# Patient Record
Sex: FEMALE | Race: WHITE | NOT HISPANIC OR LATINO | Employment: OTHER | URBAN - NONMETROPOLITAN AREA
[De-identification: names, ages, dates, MRNs, and addresses within clinical notes are randomized per-mention and may not be internally consistent; named-entity substitution may affect disease eponyms.]

---

## 2018-01-09 ENCOUNTER — OFFICE VISIT (OUTPATIENT)
Dept: PODIATRY | Facility: CLINIC | Age: 58
End: 2018-01-09

## 2018-01-09 VITALS
OXYGEN SATURATION: 97 % | BODY MASS INDEX: 33.63 KG/M2 | DIASTOLIC BLOOD PRESSURE: 78 MMHG | SYSTOLIC BLOOD PRESSURE: 125 MMHG | HEIGHT: 63 IN | WEIGHT: 189.82 LBS | HEART RATE: 54 BPM

## 2018-01-09 DIAGNOSIS — M72.2 PLANTAR FASCIITIS: ICD-10-CM

## 2018-01-09 DIAGNOSIS — M20.21 HALLUX RIGIDUS OF RIGHT FOOT: Primary | ICD-10-CM

## 2018-01-09 DIAGNOSIS — M79.672 BILATERAL FOOT PAIN: ICD-10-CM

## 2018-01-09 DIAGNOSIS — M79.671 BILATERAL FOOT PAIN: ICD-10-CM

## 2018-01-09 PROCEDURE — 99204 OFFICE O/P NEW MOD 45 MIN: CPT | Performed by: PODIATRIST

## 2018-01-09 RX ORDER — DULOXETIN HYDROCHLORIDE 30 MG/1
30 CAPSULE, DELAYED RELEASE ORAL
COMMUNITY
Start: 2018-01-02 | End: 2018-05-25 | Stop reason: SDUPTHER

## 2018-01-09 NOTE — PROGRESS NOTES
Brenda David  1960  57 y.o. female  Patient presents today for bilateral foot pain and plantar fasciitis.  Patient also states that she has tendinitis of the right foot.      2018  Chief Complaint   Patient presents with   • Left Foot - Pain, Plantar Fasciitis   • Right Foot - Pain, Plantar Fasciitis           History of Present Illness    Brenda David is a 57 y.o. female who presents for evaluation of bilateral foot pain.  She states the pain is worse in her right foot than her left.  She states that she has a known history of arthritis of her big toe joint on the right as well as plantar fasciitis on both feet.  She has been previously seen by other specialists for this issue.  He was previously recommended to her for a joint implant of the right first metatarsal phalangeal joint.  Patient states she's been putting this off for insurance reasons.  She believes that she is ready for surgery at this time.  She does primarily complains of achy, throbbing pain which is worse with activity.  She denies any recent trauma or injuries.  She denies any current treatments for this issue other than good shoe gear.        Past Medical History:   Diagnosis Date   • Arthritis    • History of     • Ingrown toenail    • Plantar fasciitis          Past Surgical History:   Procedure Laterality Date   • BACK SURGERY     •  SECTION     • HYSTERECTOMY     • SINUS SURGERY           Family History   Problem Relation Age of Onset   • Heart disease Mother    • Hypertension Mother    • Thyroid disease Mother    • Heart disease Father    • Hypertension Father    • Thyroid disease Father    • Thyroid disease Sister          Social History     Social History   • Marital status:      Spouse name: N/A   • Number of children: N/A   • Years of education: N/A     Occupational History   • Not on file.     Social History Main Topics   • Smoking status: Former Smoker   • Smokeless  "tobacco: Never Used   • Alcohol use No   • Drug use: No   • Sexual activity: Defer     Other Topics Concern   • Not on file     Social History Narrative         Current Outpatient Prescriptions   Medication Sig Dispense Refill   • DULoxetine (CYMBALTA) 30 MG capsule Take 30 mg by mouth.       No current facility-administered medications for this visit.          OBJECTIVE    /78  Pulse 54  Ht 160 cm (63\")  Wt 86.1 kg (189 lb 13.1 oz)  SpO2 97%  BMI 33.62 kg/m2      Review of Systems   Constitutional: Negative.    HENT: Negative.    Respiratory: Negative.    Cardiovascular: Negative.    Gastrointestinal: Negative.    Musculoskeletal: Positive for arthralgias and back pain.        Foot pain   All other systems reviewed and are negative.        Physical Exam   Constitutional: she appears well-developed and well-nourished.   HEENT: Normocephalic. Atraumatic  CV: No CP. RRR  Resp: Non-labored respirations. CTAB  Abd: Soft, non-tender  Lymphatic: No lymphadenopathy  Psychiatric: she has a normal mood and affect. her behavior is normal.         Lower Extremity Exam:  Vascular: DP/PT pulses palpable 2+.   Minimal right foot edema  Foot warm  CFT wnl  Neuro: Protective sensation intact, b/l.  DTRs intact  Integument: No open wounds or lesions.  No erythema, scaling  Skin quality normal  Musculoskeletal: LE muscle strength 5/5.   Gait normal  Ankle ROM decreased without pain or crepitus  STJ ROM full without pain or crepitus  1st MTPJ ROM decreased with tenderness and crepitus.   +Dorsomedial 1st mtpj eminence. Mild tenderness on palpation.  Minimal tenderness to palpation of medial plantar fascial band, b/l      Bilateral foot radiographs- Normal osseous density. No acute fractures, subluxations or erosions.  Significant degenerative changes to right first metatarsophalangeal joint.        ASSESSMENT AND PLAN    Brenda was seen today for pain, plantar fasciitis, pain and plantar fasciitis.    Diagnoses and all " orders for this visit:    Hallux rigidus of right foot  -     Case Request    Bilateral foot pain  -     XR foot 3+ vw bilateral; Future    Plantar fasciitis    -Comprehensive foot and ankle exam performed  -Radiographs ordered and reviewed  -Educated pt on diagnosis, etiology and treatment of hallux rigidus  -Recommend soft, wide toe box accommodative shoe gear.  -Had at length discussion regarding surgical intervention.  Given patient age and severity of arthrosis I would recommend a first MTPJ arthrodesis over implant and I believe this will have better long-term success and limit potential need for reoperation.  Patient is in agreement with this and would like to proceed.  All risks benefits and potential palpitations described including but not limited to delayed healing risk of infection, nonunion, continued pain and need for further operation.  -Will plan for OR 1/17/18  -Recheck 2 weeks          This document has been electronically signed by Kwadwo Jensen DPM on January 16, 2018 10:16 AM     EMR Dragon/Transcription disclaimer:   Much of this encounter note is an electronic transcription/translation of spoken language to printed text. The electronic translation of spoken language may permit erroneous, or at times, nonsensical words or phrases to be inadvertently transcribed; Although I have reviewed the note for such errors, some may still exist.    Kwadwo Jensen DPM  1/16/2018  10:16 AM

## 2018-01-10 PROBLEM — M20.21 HALLUX RIGIDUS OF RIGHT FOOT: Status: ACTIVE | Noted: 2018-01-10

## 2018-01-11 ENCOUNTER — TELEPHONE (OUTPATIENT)
Dept: PODIATRY | Facility: CLINIC | Age: 58
End: 2018-01-11

## 2018-01-11 NOTE — TELEPHONE ENCOUNTER
DR DEL [facility] OFFICE CALLED.  SAID PATIENT TOLD THEM THAT SURGICAL CLEARANCE NEEDS TO BE FAXED TO US BUT THEY ARE UNSURE WHAT YOU NEED.    [phone]    THANKS

## 2018-01-11 NOTE — TELEPHONE ENCOUNTER
Call Dr. Padgett's office and nurses were busy. Left a message for them to call back or fax surgical clearance.

## 2018-01-15 ENCOUNTER — APPOINTMENT (OUTPATIENT)
Dept: PREADMISSION TESTING | Facility: HOSPITAL | Age: 58
End: 2018-01-15

## 2018-01-15 ENCOUNTER — TELEPHONE (OUTPATIENT)
Dept: PODIATRY | Facility: CLINIC | Age: 58
End: 2018-01-15

## 2018-01-15 VITALS — BODY MASS INDEX: 30.83 KG/M2 | OXYGEN SATURATION: 99 % | HEIGHT: 63 IN | WEIGHT: 174 LBS

## 2018-01-15 RX ORDER — SODIUM CHLORIDE, SODIUM GLUCONATE, SODIUM ACETATE, POTASSIUM CHLORIDE AND MAGNESIUM CHLORIDE 526; 502; 368; 37; 30 MG/100ML; MG/100ML; MG/100ML; MG/100ML; MG/100ML
1000 INJECTION, SOLUTION INTRAVENOUS CONTINUOUS
Status: CANCELLED | OUTPATIENT
Start: 2018-01-24

## 2018-01-24 ENCOUNTER — HOSPITAL ENCOUNTER (OUTPATIENT)
Facility: HOSPITAL | Age: 58
Setting detail: HOSPITAL OUTPATIENT SURGERY
Discharge: HOME | End: 2018-01-24
Attending: PODIATRIST | Admitting: PODIATRIST

## 2018-01-24 ENCOUNTER — ANESTHESIA (OUTPATIENT)
Dept: PERIOP | Facility: HOSPITAL | Age: 58
End: 2018-01-24

## 2018-01-24 ENCOUNTER — APPOINTMENT (OUTPATIENT)
Dept: GENERAL RADIOLOGY | Facility: HOSPITAL | Age: 58
End: 2018-01-24

## 2018-01-24 ENCOUNTER — ANESTHESIA EVENT (OUTPATIENT)
Dept: PERIOP | Facility: HOSPITAL | Age: 58
End: 2018-01-24

## 2018-01-24 VITALS
OXYGEN SATURATION: 96 % | BODY MASS INDEX: 32.77 KG/M2 | DIASTOLIC BLOOD PRESSURE: 64 MMHG | WEIGHT: 184.97 LBS | RESPIRATION RATE: 18 BRPM | SYSTOLIC BLOOD PRESSURE: 139 MMHG | HEIGHT: 63 IN | HEART RATE: 69 BPM | TEMPERATURE: 97.1 F

## 2018-01-24 PROCEDURE — C1713 ANCHOR/SCREW BN/BN,TIS/BN: HCPCS | Performed by: PODIATRIST

## 2018-01-24 PROCEDURE — 76000 FLUOROSCOPY <1 HR PHYS/QHP: CPT

## 2018-01-24 PROCEDURE — 25010000002 DEXAMETHASONE PER 1 MG: Performed by: NURSE ANESTHETIST, CERTIFIED REGISTERED

## 2018-01-24 PROCEDURE — L4361 PNEUMA/VAC WALK BOOT PRE OTS: HCPCS | Performed by: PODIATRIST

## 2018-01-24 PROCEDURE — 25010000002 ONDANSETRON PER 1 MG: Performed by: NURSE ANESTHETIST, CERTIFIED REGISTERED

## 2018-01-24 PROCEDURE — 28750 FUSION OF BIG TOE JOINT: CPT | Performed by: PODIATRIST

## 2018-01-24 PROCEDURE — 25010000002 HYDROMORPHONE PER 4 MG: Performed by: NURSE ANESTHETIST, CERTIFIED REGISTERED

## 2018-01-24 PROCEDURE — 25010000002 FENTANYL CITRATE (PF) 100 MCG/2ML SOLUTION: Performed by: NURSE ANESTHETIST, CERTIFIED REGISTERED

## 2018-01-24 PROCEDURE — 25010000002 PROPOFOL 10 MG/ML EMULSION: Performed by: NURSE ANESTHETIST, CERTIFIED REGISTERED

## 2018-01-24 PROCEDURE — 25010000002 MIDAZOLAM PER 1 MG: Performed by: NURSE ANESTHETIST, CERTIFIED REGISTERED

## 2018-01-24 DEVICE — 2.7MM X 12MM DOUBLE-LEAD LOCKING SCREW, T8
Type: IMPLANTABLE DEVICE | Status: FUNCTIONAL
Brand: OSTEOMED

## 2018-01-24 DEVICE — 4.0MM X 34MM, DOUBLE LEAD, NON-LOCKING SCREW, CANNULATED T15
Type: IMPLANTABLE DEVICE | Status: FUNCTIONAL
Brand: OSTEOMED

## 2018-01-24 DEVICE — 2.7MM X 14MM DOUBLE-LEAD NON-LOCKING SCREW, T8
Type: IMPLANTABLE DEVICE | Status: FUNCTIONAL
Brand: OSTEOMED

## 2018-01-24 DEVICE — 2.7MM X 16MM DOUBLE-LEAD LOCKING SCREW, T8
Type: IMPLANTABLE DEVICE | Status: FUNCTIONAL
Brand: OSTEOMED

## 2018-01-24 DEVICE — 2.7MM - 1ST MTP FUSION PLATE, 5 HOLE NARROW, 0/5 DEGREE, RIGHT
Type: IMPLANTABLE DEVICE | Status: FUNCTIONAL
Brand: OSTEOMED

## 2018-01-24 DEVICE — 4.0MM X 32MM, DOUBLE LEAD, NON-LOCKING SCREW, CANNULATED T15
Type: IMPLANTABLE DEVICE | Status: FUNCTIONAL
Brand: OSTEOMED

## 2018-01-24 DEVICE — 2.7MM X 16MM DOUBLE-LEAD NON-LOCKING SCREW, T8
Type: IMPLANTABLE DEVICE | Status: FUNCTIONAL
Brand: OSTEOMED

## 2018-01-24 RX ORDER — SODIUM CHLORIDE, SODIUM GLUCONATE, SODIUM ACETATE, POTASSIUM CHLORIDE AND MAGNESIUM CHLORIDE 526; 502; 368; 37; 30 MG/100ML; MG/100ML; MG/100ML; MG/100ML; MG/100ML
1000 INJECTION, SOLUTION INTRAVENOUS CONTINUOUS
Status: DISCONTINUED | OUTPATIENT
Start: 2018-01-24 | End: 2018-01-25 | Stop reason: HOSPADM

## 2018-01-24 RX ORDER — ACETAMINOPHEN 325 MG/1
650 TABLET ORAL ONCE AS NEEDED
Status: DISCONTINUED | OUTPATIENT
Start: 2018-01-24 | End: 2018-01-25 | Stop reason: HOSPADM

## 2018-01-24 RX ORDER — DIPHENHYDRAMINE HYDROCHLORIDE 50 MG/ML
12.5 INJECTION INTRAMUSCULAR; INTRAVENOUS
Status: CANCELLED | OUTPATIENT
Start: 2018-01-24

## 2018-01-24 RX ORDER — ONDANSETRON 2 MG/ML
INJECTION INTRAMUSCULAR; INTRAVENOUS AS NEEDED
Status: DISCONTINUED | OUTPATIENT
Start: 2018-01-24 | End: 2018-01-24 | Stop reason: SURG

## 2018-01-24 RX ORDER — ACETAMINOPHEN 650 MG/1
650 SUPPOSITORY RECTAL ONCE AS NEEDED
Status: DISCONTINUED | OUTPATIENT
Start: 2018-01-24 | End: 2018-01-25 | Stop reason: HOSPADM

## 2018-01-24 RX ORDER — SODIUM CHLORIDE, SODIUM GLUCONATE, SODIUM ACETATE, POTASSIUM CHLORIDE, AND MAGNESIUM CHLORIDE 526; 502; 368; 37; 30 MG/100ML; MG/100ML; MG/100ML; MG/100ML; MG/100ML
INJECTION, SOLUTION INTRAVENOUS CONTINUOUS PRN
Status: DISCONTINUED | OUTPATIENT
Start: 2018-01-24 | End: 2018-01-24 | Stop reason: SURG

## 2018-01-24 RX ORDER — EPHEDRINE SULFATE 50 MG/ML
5 INJECTION, SOLUTION INTRAVENOUS ONCE AS NEEDED
Status: DISCONTINUED | OUTPATIENT
Start: 2018-01-24 | End: 2018-01-25 | Stop reason: HOSPADM

## 2018-01-24 RX ORDER — FENTANYL CITRATE 50 UG/ML
INJECTION, SOLUTION INTRAMUSCULAR; INTRAVENOUS AS NEEDED
Status: DISCONTINUED | OUTPATIENT
Start: 2018-01-24 | End: 2018-01-24 | Stop reason: SURG

## 2018-01-24 RX ORDER — PROPOFOL 10 MG/ML
VIAL (ML) INTRAVENOUS AS NEEDED
Status: DISCONTINUED | OUTPATIENT
Start: 2018-01-24 | End: 2018-01-24 | Stop reason: SURG

## 2018-01-24 RX ORDER — MIDAZOLAM HYDROCHLORIDE 1 MG/ML
INJECTION INTRAMUSCULAR; INTRAVENOUS AS NEEDED
Status: DISCONTINUED | OUTPATIENT
Start: 2018-01-24 | End: 2018-01-24 | Stop reason: SURG

## 2018-01-24 RX ORDER — DIPHENHYDRAMINE HYDROCHLORIDE 50 MG/ML
12.5 INJECTION INTRAMUSCULAR; INTRAVENOUS
Status: DISCONTINUED | OUTPATIENT
Start: 2018-01-24 | End: 2018-01-25 | Stop reason: HOSPADM

## 2018-01-24 RX ORDER — BUPIVACAINE HYDROCHLORIDE 5 MG/ML
INJECTION, SOLUTION EPIDURAL; INTRACAUDAL AS NEEDED
Status: DISCONTINUED | OUTPATIENT
Start: 2018-01-24 | End: 2018-01-25 | Stop reason: HOSPADM

## 2018-01-24 RX ORDER — LIDOCAINE HYDROCHLORIDE 20 MG/ML
INJECTION, SOLUTION INFILTRATION; PERINEURAL AS NEEDED
Status: DISCONTINUED | OUTPATIENT
Start: 2018-01-24 | End: 2018-01-24 | Stop reason: SURG

## 2018-01-24 RX ORDER — OXYCODONE AND ACETAMINOPHEN 7.5; 325 MG/1; MG/1
1 TABLET ORAL EVERY 6 HOURS PRN
Qty: 40 TABLET | Refills: 0 | Status: SHIPPED | OUTPATIENT
Start: 2018-01-24 | End: 2018-01-30 | Stop reason: SDUPTHER

## 2018-01-24 RX ORDER — NALOXONE HCL 0.4 MG/ML
0.2 VIAL (ML) INJECTION AS NEEDED
Status: CANCELLED | OUTPATIENT
Start: 2018-01-24

## 2018-01-24 RX ORDER — FLUMAZENIL 0.1 MG/ML
0.2 INJECTION INTRAVENOUS AS NEEDED
Status: DISCONTINUED | OUTPATIENT
Start: 2018-01-24 | End: 2018-01-25 | Stop reason: HOSPADM

## 2018-01-24 RX ORDER — DEXAMETHASONE SODIUM PHOSPHATE 4 MG/ML
INJECTION, SOLUTION INTRA-ARTICULAR; INTRALESIONAL; INTRAMUSCULAR; INTRAVENOUS; SOFT TISSUE AS NEEDED
Status: DISCONTINUED | OUTPATIENT
Start: 2018-01-24 | End: 2018-01-24 | Stop reason: SURG

## 2018-01-24 RX ORDER — ONDANSETRON 2 MG/ML
4 INJECTION INTRAMUSCULAR; INTRAVENOUS ONCE AS NEEDED
Status: DISCONTINUED | OUTPATIENT
Start: 2018-01-24 | End: 2018-01-25 | Stop reason: HOSPADM

## 2018-01-24 RX ORDER — NALOXONE HCL 0.4 MG/ML
0.2 VIAL (ML) INJECTION AS NEEDED
Status: DISCONTINUED | OUTPATIENT
Start: 2018-01-24 | End: 2018-01-25 | Stop reason: HOSPADM

## 2018-01-24 RX ORDER — LABETALOL HYDROCHLORIDE 5 MG/ML
5 INJECTION, SOLUTION INTRAVENOUS
Status: DISCONTINUED | OUTPATIENT
Start: 2018-01-24 | End: 2018-01-25 | Stop reason: HOSPADM

## 2018-01-24 RX ORDER — OXYCODONE AND ACETAMINOPHEN 7.5; 325 MG/1; MG/1
1 TABLET ORAL ONCE
Status: COMPLETED | OUTPATIENT
Start: 2018-01-24 | End: 2018-01-24

## 2018-01-24 RX ORDER — CLINDAMYCIN PHOSPHATE 900 MG/50ML
900 INJECTION INTRAVENOUS ONCE
Status: COMPLETED | OUTPATIENT
Start: 2018-01-24 | End: 2018-01-24

## 2018-01-24 RX ORDER — LIDOCAINE HYDROCHLORIDE 10 MG/ML
0.5 INJECTION, SOLUTION INFILTRATION; PERINEURAL ONCE AS NEEDED
Status: COMPLETED | OUTPATIENT
Start: 2018-01-24 | End: 2018-01-24

## 2018-01-24 RX ORDER — FLUMAZENIL 0.1 MG/ML
0.2 INJECTION INTRAVENOUS AS NEEDED
Status: CANCELLED | OUTPATIENT
Start: 2018-01-24

## 2018-01-24 RX ADMIN — HYDROMORPHONE HYDROCHLORIDE 0.5 MG: 1 INJECTION, SOLUTION INTRAMUSCULAR; INTRAVENOUS; SUBCUTANEOUS at 13:45

## 2018-01-24 RX ADMIN — FENTANYL CITRATE 25 MCG: 50 INJECTION, SOLUTION INTRAMUSCULAR; INTRAVENOUS at 11:53

## 2018-01-24 RX ADMIN — FENTANYL CITRATE 25 MCG: 50 INJECTION, SOLUTION INTRAMUSCULAR; INTRAVENOUS at 12:38

## 2018-01-24 RX ADMIN — FENTANYL CITRATE 25 MCG: 50 INJECTION, SOLUTION INTRAMUSCULAR; INTRAVENOUS at 11:44

## 2018-01-24 RX ADMIN — OXYCODONE HYDROCHLORIDE AND ACETAMINOPHEN 1 TABLET: 7.5; 325 TABLET ORAL at 14:51

## 2018-01-24 RX ADMIN — FENTANYL CITRATE 25 MCG: 50 INJECTION, SOLUTION INTRAMUSCULAR; INTRAVENOUS at 11:49

## 2018-01-24 RX ADMIN — CLINDAMYCIN IN 5 PERCENT DEXTROSE 900 MG: 18 INJECTION, SOLUTION INTRAVENOUS at 11:33

## 2018-01-24 RX ADMIN — HYDROMORPHONE HYDROCHLORIDE 0.5 MG: 1 INJECTION, SOLUTION INTRAMUSCULAR; INTRAVENOUS; SUBCUTANEOUS at 13:15

## 2018-01-24 RX ADMIN — DEXAMETHASONE SODIUM PHOSPHATE 4 MG: 4 INJECTION, SOLUTION INTRAMUSCULAR; INTRAVENOUS at 11:37

## 2018-01-24 RX ADMIN — MIDAZOLAM 2 MG: 1 INJECTION INTRAMUSCULAR; INTRAVENOUS at 11:21

## 2018-01-24 RX ADMIN — FENTANYL CITRATE 50 MCG: 50 INJECTION, SOLUTION INTRAMUSCULAR; INTRAVENOUS at 12:23

## 2018-01-24 RX ADMIN — SODIUM CHLORIDE, SODIUM GLUCONATE, SODIUM ACETATE, POTASSIUM CHLORIDE AND MAGNESIUM CHLORIDE 1000 ML: 526; 502; 368; 37; 30 INJECTION, SOLUTION INTRAVENOUS at 09:39

## 2018-01-24 RX ADMIN — ONDANSETRON 4 MG: 2 INJECTION INTRAMUSCULAR; INTRAVENOUS at 12:32

## 2018-01-24 RX ADMIN — LIDOCAINE HYDROCHLORIDE 0.5 ML: 10 INJECTION, SOLUTION EPIDURAL; INFILTRATION; INTRACAUDAL; PERINEURAL at 09:39

## 2018-01-24 RX ADMIN — SODIUM CHLORIDE, SODIUM GLUCONATE, SODIUM ACETATE, POTASSIUM CHLORIDE AND MAGNESIUM CHLORIDE 200 ML: 526; 502; 368; 37; 30 INJECTION, SOLUTION INTRAVENOUS at 11:23

## 2018-01-24 RX ADMIN — SODIUM CHLORIDE, SODIUM GLUCONATE, SODIUM ACETATE, POTASSIUM CHLORIDE, AND MAGNESIUM CHLORIDE: 526; 502; 368; 37; 30 INJECTION, SOLUTION INTRAVENOUS at 12:43

## 2018-01-24 RX ADMIN — SODIUM CHLORIDE, SODIUM GLUCONATE, SODIUM ACETATE, POTASSIUM CHLORIDE AND MAGNESIUM CHLORIDE 300 ML: 526; 502; 368; 37; 30 INJECTION, SOLUTION INTRAVENOUS at 12:42

## 2018-01-24 RX ADMIN — FENTANYL CITRATE 50 MCG: 50 INJECTION, SOLUTION INTRAMUSCULAR; INTRAVENOUS at 11:27

## 2018-01-24 RX ADMIN — PROPOFOL 150 MG: 10 INJECTION, EMULSION INTRAVENOUS at 11:27

## 2018-01-24 RX ADMIN — LIDOCAINE HYDROCHLORIDE 80 MG: 20 INJECTION, SOLUTION INFILTRATION; PERINEURAL at 11:27

## 2018-01-24 RX ADMIN — SODIUM CHLORIDE, SODIUM GLUCONATE, SODIUM ACETATE, POTASSIUM CHLORIDE AND MAGNESIUM CHLORIDE 200 ML: 526; 502; 368; 37; 30 INJECTION, SOLUTION INTRAVENOUS at 11:40

## 2018-01-24 RX ADMIN — SODIUM CHLORIDE, SODIUM GLUCONATE, SODIUM ACETATE, POTASSIUM CHLORIDE AND MAGNESIUM CHLORIDE 300 ML: 526; 502; 368; 37; 30 INJECTION, SOLUTION INTRAVENOUS at 12:04

## 2018-01-24 NOTE — PLAN OF CARE
Problem: Patient Care Overview (Adult)  Goal: Plan of Care Review  Outcome: Ongoing (interventions implemented as appropriate)   01/24/18 1405   Coping/Psychosocial Response Interventions   Plan Of Care Reviewed With patient   Patient Care Overview   Progress improving   Outcome Evaluation   Outcome Summary/Follow up Plan vss. no distress noted. nonverbal pain 0. talkative, pleasant - tolerating ice chips. foot of bed elevated. dsg cdi. ice pack to rt foot.      Goal: Adult Individualization and Mutuality  Outcome: Ongoing (interventions implemented as appropriate)      Problem: Perioperative Period (Adult)  Goal: Signs and Symptoms of Listed Potential Problems Will be Absent or Manageable (Perioperative Period)  Outcome: Ongoing (interventions implemented as appropriate)

## 2018-01-24 NOTE — INTERVAL H&P NOTE
H&P reviewed. The patient was examined and there are no changes to the H&P.   Proceed as planned.          This document has been electronically signed by Kwadwo Jensen DPM on January 24, 2018 11:02 AM

## 2018-01-24 NOTE — ANESTHESIA PROCEDURE NOTES
Airway  Urgency: elective    Airway not difficult    General Information and Staff    Patient location during procedure: OR  CRNA: CHINA MALCOLM    Indications and Patient Condition  Indications for airway management: airway protection    Preoxygenated: yes  Mask difficulty assessment: 0 - not attempted    Final Airway Details  Final airway type: supraglottic airway      Successful airway: I-gel  Size 4    Number of attempts at approach: 1

## 2018-01-24 NOTE — OP NOTE
SURGEON: Kwadwo Jensen DPM    ASSISTANT: Carol Burdick MA    PREOPERATIVE DIAGNOSIS: Hallux rigidus, right foot.    POSTOPERATIVE DIAGNOSIS: Hallux rigidus, right foot.    PROCEDURE PERFORMED: Right 1st metatarsal phalangeal joint arthrodesis.    ANESTHESIA: General.     HEMOSTASIS: Right ankle pneumatic tourniquet 250 mmHg for approximately 40 minutes.    ESTIMATED BLOOD LOSS: Less than 10 mL.    MATERIALS:   1. OsteoMed 4.0 mm fully threaded cannulated screws x2.  2. OsteoMed ExtremiLock foot plating system dorsal locking plate with associated 2.7 mm screw.    INJECTABLES: 20 mL of 0.5% Marcaine plain.    COMPLICATIONS: None.    INDICATIONS: This patient was seen on an outpatient basis for painful arthritic changes of the 1st metatarsal phalangeal joint. Alternative treatment options were discussed with the patient and the patient elected for proposed surgical plan. All risks, benefits and potential complications were presented to family in detail, no guarantees given or implied at any time.  The patient was made n.p.o. since midnight. Formal consent was obtained and located in the chart. Then, 900 mg of clindamycin was given as preoperative antibiotics in the preoperative holding area.    DESCRIPTION OF PROCEDURE: Under mild sedation, the patient was brought to the operating room and placed on the operative table in the supine position. Following induction of general anesthesia, the right foot and ankle were prepped and draped in the usual aseptic fashion. Attention at this time was then directed towards the dorsal aspect of the 1st metatarsal phalangeal joint where approximately a 4 cm linear longitudinal incision was created and centered over the joint. A combination of sharp and blunt dissection was then carried through the subcutaneous layer to the capsular structures. A linear longitudinal capsulotomy was then created exposing the head of the 1st metatarsal and the base of the proximal phalanx. The  cup and cone reamers from OsteoMed set were then utilized to prepare either side of the arthrodesis set and a small 2.0 mm drill bit was utilized to fenestrate the subchondral cortex. The joint was then flushed with copious amounts of sterile saline. The hallux was positioned in the neutral alignment and temporary fixated with crossed smooth wires from the OsteoMed cannulated screw set. Permanent fixation was then achieved with a crossed 4.0 mm fully threaded screw in lag fashion. Intraoperative fluoroscopy was utilized throughout to confirm appropriate reduction of the deformity and compression of the arthrodesis site. A 5-hole dorsal locking plate was then applied and final intraoperative fluoroscopy images were taken. The incision was then flushed with copious amounts of sterile saline. The incision was closed in a layered fashion. Pneumatic tourniquet was deflated, immediate hyperemic response was noted digits 1 through 5 in the right foot. Dry sterile dressing was applied. The patient was taken from the operating room to the recovery room with vital signs stable and neurovascularly intact. She will be to heel weight bear in a surgical boot and she will follow up next week for incision check.

## 2018-01-24 NOTE — DISCHARGE INSTRUCTIONS
Leave dressing clean, dry and intact until your first postoperative visit.    You may heel weight bear as tolerated in your surgical boot only.    Take pain medications as prescribed.     Elevate surgical extremity above level of heart while at rest. Apply ice back behind knee for 10 minutes of every hour while at rest.     Contact doctor for increased pain, drainage, nausea, vomiting, fever or chills.

## 2018-01-24 NOTE — ANESTHESIA POSTPROCEDURE EVALUATION
Patient: Brenda David    Procedure Summary     Date Anesthesia Start Anesthesia Stop Room / Location    01/24/18 1123 1307  [facility] OR 09 / [facility]       Procedure Diagnosis Surgeon Provider    FIRST METATARSOPHALANGEAL JOINT ARTHRODESIS       (C-ARM)                  LATEX [facility]ERGY (Right ) Hallux rigidus of right foot  (Hallux rigidus of right foot [M20.21]) SPENCER Sunshine MD          Anesthesia Type: general  Last vitals  BP   126/88 (01/24/18 0902)   Temp   98.2 °F (36.8 °C) (01/24/18 0902)   Pulse   62 (01/24/18 0902)   Resp   18 (01/24/18 0902)     SpO2   97 % (01/24/18 0902)     Post Anesthesia Care and Evaluation    Patient location during evaluation: PACU  Patient participation: waiting for patient participation  Level of consciousness: sleepy but conscious  Pain score: 0  Pain management: adequate  Airway patency: patent  Anesthetic complications: No anesthetic complications  PONV Status: none  Cardiovascular status: acceptable  Respiratory status: acceptable  Hydration status: acceptable

## 2018-01-24 NOTE — ANESTHESIA PREPROCEDURE EVALUATION
Anesthesia Evaluation     NPO Solid Status: > 8 hours  NPO Liquid Status: > 8 hours     Airway   Mallampati: II  TM distance: >3 FB  Neck ROM: full  no difficulty expected  Dental - normal exam     Pulmonary     breath sounds clear to auscultation  Cardiovascular     Rhythm: regular  Rate: normal        Neuro/Psych  (+) psychiatric history Anxiety,     GI/Hepatic/Renal/Endo    (+) obesity,      Musculoskeletal     Abdominal   (+) obese,     Abdomen: soft.   Substance History      OB/GYN          Other   (+) arthritis                                             Anesthesia Plan    ASA 2     general     intravenous induction   Anesthetic plan and risks discussed with patient.

## 2018-01-30 ENCOUNTER — OFFICE VISIT (OUTPATIENT)
Dept: PODIATRY | Facility: CLINIC | Age: 58
End: 2018-01-30

## 2018-01-30 VITALS — HEIGHT: 63 IN | BODY MASS INDEX: 32.77 KG/M2 | WEIGHT: 184.97 LBS

## 2018-01-30 DIAGNOSIS — M20.21 HALLUX RIGIDUS OF RIGHT FOOT: Primary | ICD-10-CM

## 2018-01-30 PROCEDURE — 99024 POSTOP FOLLOW-UP VISIT: CPT | Performed by: PODIATRIST

## 2018-01-30 RX ORDER — OXYCODONE AND ACETAMINOPHEN 7.5; 325 MG/1; MG/1
1 TABLET ORAL EVERY 6 HOURS PRN
Qty: 40 TABLET | Refills: 0 | Status: SHIPPED | OUTPATIENT
Start: 2018-01-30 | End: 2018-04-09

## 2018-02-06 ENCOUNTER — OFFICE VISIT (OUTPATIENT)
Dept: PODIATRY | Facility: CLINIC | Age: 58
End: 2018-02-06

## 2018-02-06 VITALS — WEIGHT: 184.97 LBS | HEIGHT: 63 IN | BODY MASS INDEX: 32.77 KG/M2

## 2018-02-06 DIAGNOSIS — M79.671 FOOT PAIN, RIGHT: ICD-10-CM

## 2018-02-06 DIAGNOSIS — M20.21 HALLUX RIGIDUS OF RIGHT FOOT: Primary | ICD-10-CM

## 2018-02-06 PROCEDURE — 99024 POSTOP FOLLOW-UP VISIT: CPT | Performed by: PODIATRIST

## 2018-02-06 NOTE — PROGRESS NOTES
Brenda David  1960  57 y.o. female  Patient presents today for right foot post-op.    2018    Chief Complaint   Patient presents with   • Right Foot - Follow-up, Post-op           History of Present Illness    Brenda David is a 57 y.o. female who presents for Follow-up of right first metatarsal phalangeal joint arthrodesis.  Date of surgery 2018.  She is doing well with expected postsurgical pain.  She has been weightbearing in her cam boot without issue.    Past Medical History:   Diagnosis Date   • Arthritis    • History of     • Ingrown toenail    • Plantar fasciitis          Past Surgical History:   Procedure Laterality Date   • BACK SURGERY     •  SECTION     • HYSTERECTOMY     • MTP JOINT FUSION Right 2018    Procedure: FIRST METATARSOPHALANGEAL JOINT ARTHRODESIS       (C-ARM)                  LATEX ALLERGY;  Surgeon: Kwadwo Jensen DPM;  Location: [facility];  Service:    • SINUS SURGERY           Family History   Problem Relation Age of Onset   • Heart disease Mother    • Hypertension Mother    • Thyroid disease Mother    • Heart disease Father    • Hypertension Father    • Thyroid disease Father    • Thyroid disease Sister          Social History     Social History   • Marital status:      Spouse name: N/A   • Number of children: N/A   • Years of education: N/A     Occupational History   • Not on file.     Social History Main Topics   • Smoking status: Former Smoker   • Smokeless tobacco: Never Used   • Alcohol use No   • Drug use: No   • Sexual activity: Defer     Other Topics Concern   • Not on file     Social History Narrative         Current Outpatient Prescriptions   Medication Sig Dispense Refill   • DULoxetine (CYMBALTA) 30 MG capsule Take 30 mg by mouth.     • oxyCODONE-acetaminophen (PERCOCET) 7.5-325 MG per tablet Take 1 tablet by mouth Every 6 (Six) Hours As Needed for Moderate Pain  or Severe Pain . 40 tablet 0  "    No current facility-administered medications for this visit.          OBJECTIVE    Ht 160 cm (63\")  Wt 83.9 kg (184 lb 15.5 oz)  BMI 32.77 kg/m2      Review of Systems   Constitutional: Negative.    HENT: Negative.    Respiratory: Negative.    Cardiovascular: Negative.    Gastrointestinal: Negative.    Musculoskeletal: Positive for arthralgias and back pain.        Foot pain   All other systems reviewed and are negative.        Physical Exam   Constitutional: she appears well-developed and well-nourished.   HEENT: Normocephalic. Atraumatic  CV: No CP. RRR  Resp: Non-labored respirations. CTAB  Abd: Soft, non-tender  Lymphatic: No lymphadenopathy  Psychiatric: she has a normal mood and affect. her behavior is normal.         Lower Extremity Exam:  Vascular: DP/PT pulses palpable 2+.   Minimal right foot edema  Foot warm  CFT wnl  Neuro: Protective sensation intact, b/l.  DTRs intact  Integument: No open wounds or lesions.  Right foot incsion coapted with sutures in place. No SOI  No erythema, scaling  Skin quality normal  Musculoskeletal: LE muscle strength 5/5.   Gait normal  Ankle ROM decreased without pain or crepitus  STJ ROM full without pain or crepitus  1st MTPJ rectus, rigid          ASSESSMENT AND PLAN    Brenda was seen today for follow-up and post-op.    Diagnoses and all orders for this visit:    Hallux rigidus of right foot    Foot pain, right  -     XR Foot 3+ View Right    -Comprehensive foot and ankle exam performed  -Radiographs ordered and reviewed  -Sutures removed.  -Cont CAM boot for all ambulation  -Recheck 2 weeks          This document has been electronically signed by Kwadwo Jensen DPM on February 11, 2018 5:13 PM     EMR Dragon/Transcription disclaimer:   Much of this encounter note is an electronic transcription/translation of spoken language to printed text. The electronic translation of spoken language may permit erroneous, or at times, nonsensical words or phrases to be " inadvertently transcribed; Although I have reviewed the note for such errors, some may still exist.    Kwadwo Jensen DPM  2/11/2018  5:13 PM

## 2018-02-20 ENCOUNTER — OFFICE VISIT (OUTPATIENT)
Dept: PODIATRY | Facility: CLINIC | Age: 58
End: 2018-02-20

## 2018-02-20 VITALS — WEIGHT: 184.97 LBS | HEIGHT: 63 IN | BODY MASS INDEX: 32.77 KG/M2

## 2018-02-20 DIAGNOSIS — M20.21 HALLUX RIGIDUS OF RIGHT FOOT: Primary | ICD-10-CM

## 2018-02-20 DIAGNOSIS — M79.671 FOOT PAIN, RIGHT: ICD-10-CM

## 2018-02-20 PROCEDURE — 99024 POSTOP FOLLOW-UP VISIT: CPT | Performed by: PODIATRIST

## 2018-02-20 RX ORDER — NABUMETONE 500 MG/1
500 TABLET, FILM COATED ORAL 2 TIMES DAILY PRN
Qty: 60 TABLET | Refills: 0 | Status: SHIPPED | OUTPATIENT
Start: 2018-02-20 | End: 2018-02-20 | Stop reason: SDUPTHER

## 2018-02-20 RX ORDER — NABUMETONE 500 MG/1
500 TABLET, FILM COATED ORAL 2 TIMES DAILY PRN
Qty: 60 TABLET | Refills: 0 | Status: SHIPPED | OUTPATIENT
Start: 2018-02-20 | End: 2018-03-19 | Stop reason: SDUPTHER

## 2018-02-20 NOTE — PROGRESS NOTES
Brenda David  1960  57 y.o. female  Patient presents today for right foot post-op.  2018      Chief Complaint   Patient presents with   • Right Foot - Follow-up, Post-op           History of Present Illness    Brenda David is a 57 y.o. female who presents for Follow-up of right first metatarsal phalangeal joint arthrodesis.  Date of surgery 2018.  She is doing well with improving postsurgical pain.  She has been weightbearing in her cam boot without issue. Does note increased right knee pain.    Past Medical History:   Diagnosis Date   • Arthritis    • History of     • Ingrown toenail    • Plantar fasciitis          Past Surgical History:   Procedure Laterality Date   • BACK SURGERY     •  SECTION     • HYSTERECTOMY     • MTP JOINT FUSION Right 2018    Procedure: FIRST METATARSOPHALANGEAL JOINT ARTHRODESIS       (C-ARM)                  LATEX ALLERGY;  Surgeon: Kwadwo Jensen DPM;  Location: [facility];  Service:    • SINUS SURGERY           Family History   Problem Relation Age of Onset   • Heart disease Mother    • Hypertension Mother    • Thyroid disease Mother    • Heart disease Father    • Hypertension Father    • Thyroid disease Father    • Thyroid disease Sister          Social History     Social History   • Marital status:      Spouse name: N/A   • Number of children: N/A   • Years of education: N/A     Occupational History   • Not on file.     Social History Main Topics   • Smoking status: Former Smoker   • Smokeless tobacco: Never Used   • Alcohol use No   • Drug use: No   • Sexual activity: Defer     Other Topics Concern   • Not on file     Social History Narrative         Current Outpatient Prescriptions   Medication Sig Dispense Refill   • DULoxetine (CYMBALTA) 30 MG capsule Take 30 mg by mouth.     • oxyCODONE-acetaminophen (PERCOCET) 7.5-325 MG per tablet Take 1 tablet by mouth Every 6 (Six) Hours As Needed for Moderate  "Pain  or Severe Pain . 40 tablet 0   • nabumetone (RELAFEN) 500 MG tablet Take 1 tablet by mouth 2 (Two) Times a Day As Needed for Mild Pain . 60 tablet 0     No current facility-administered medications for this visit.          OBJECTIVE    Ht 160 cm (63\")  Wt 83.9 kg (184 lb 15.5 oz)  BMI 32.77 kg/m2      Review of Systems   Constitutional: Negative.    HENT: Negative.    Respiratory: Negative.    Cardiovascular: Negative.    Gastrointestinal: Negative.    Musculoskeletal: Positive for arthralgias and back pain.        Foot pain   All other systems reviewed and are negative.        Physical Exam   Constitutional: she appears well-developed and well-nourished.   HEENT: Normocephalic. Atraumatic  CV: No CP. RRR  Resp: Non-labored respirations. CTAB  Abd: Soft, non-tender  Lymphatic: No lymphadenopathy  Psychiatric: she has a normal mood and affect. her behavior is normal.         Lower Extremity Exam:  Vascular: DP/PT pulses palpable 2+.   Minimal right foot edema  Foot warm  CFT wnl  Negative abigail  Neuro: Protective sensation intact, b/l.  DTRs intact  Integument: No open wounds or lesions.  Right foot incsion well healed. No SOI  No erythema, scaling  Skin quality normal  Musculoskeletal: LE muscle strength 5/5.   Gait normal  Ankle ROM decreased without pain or crepitus  STJ ROM full without pain or crepitus  1st MTPJ rectus, rigid  Right knee TTP to anterior margin, patella tendon          ASSESSMENT AND PLAN    Brenda was seen today for follow-up and post-op.    Diagnoses and all orders for this visit:    Hallux rigidus of right foot    Foot pain, right  -     XR Foot 3+ View Right    Other orders  -     Discontinue: nabumetone (RELAFEN) 500 MG tablet; Take 1 tablet by mouth 2 (Two) Times a Day As Needed for Mild Pain .  -     nabumetone (RELAFEN) 500 MG tablet; Take 1 tablet by mouth 2 (Two) Times a Day As Needed for Mild Pain .      -Comprehensive foot and ankle exam performed  -Radiographs ordered and " reviewed. Stable hardware with progressive healing noted  -Surgigrip for edema control  -Cont CAM boot for all ambulation  -Rx Relafen for knee pain  -Recheck 3 weeks          This document has been electronically signed by Kwadwo Jensen DPM on February 25, 2018 7:24 PM     EMR Dragon/Transcription disclaimer:   Much of this encounter note is an electronic transcription/translation of spoken language to printed text. The electronic translation of spoken language may permit erroneous, or at times, nonsensical words or phrases to be inadvertently transcribed; Although I have reviewed the note for such errors, some may still exist.    Kwadwo Jensen DPM  2/25/2018  7:24 PM

## 2018-03-13 ENCOUNTER — OFFICE VISIT (OUTPATIENT)
Dept: PODIATRY | Facility: CLINIC | Age: 58
End: 2018-03-13

## 2018-03-13 VITALS — WEIGHT: 184.97 LBS | HEIGHT: 63 IN | BODY MASS INDEX: 32.77 KG/M2

## 2018-03-13 DIAGNOSIS — M79.671 FOOT PAIN, RIGHT: ICD-10-CM

## 2018-03-13 DIAGNOSIS — M20.21 HALLUX RIGIDUS OF RIGHT FOOT: Primary | ICD-10-CM

## 2018-03-13 PROCEDURE — 99024 POSTOP FOLLOW-UP VISIT: CPT | Performed by: PODIATRIST

## 2018-03-13 NOTE — PROGRESS NOTES
Brenda David  1960  57 y.o. female  Patient presents today for right foot post-op.  2018        Chief Complaint   Patient presents with   • Right Foot - Post-op Follow-up           History of Present Illness    Brenda David is a 57 y.o. female who presents for Follow-up of right first metatarsal phalangeal joint arthrodesis.  Date of surgery 2018.  She is doing well with Minimal pain.  She does note mild hyperesthesia to the inside of her big toe.  She has been weightbearing in her cam boot without issue.     Past Medical History:   Diagnosis Date   • Arthritis    • History of     • Ingrown toenail    • Plantar fasciitis          Past Surgical History:   Procedure Laterality Date   • BACK SURGERY     •  SECTION     • HYSTERECTOMY     • MTP JOINT FUSION Right 2018    Procedure: FIRST METATARSOPHALANGEAL JOINT ARTHRODESIS       (C-ARM)                  LATEX [facility]ERGY;  Surgeon: Kwadwo Jensen DPM;  Location: [facility];  Service:    • SINUS SURGERY           Family History   Problem Relation Age of Onset   • Heart disease Mother    • Hypertension Mother    • Thyroid disease Mother    • Heart disease Father    • Hypertension Father    • Thyroid disease Father    • Thyroid disease Sister          Social History     Social History   • Marital status:      Spouse name: N/A   • Number of children: N/A   • Years of education: N/A     Occupational History   • Not on file.     Social History Main Topics   • Smoking status: Former Smoker   • Smokeless tobacco: Never Used   • Alcohol use No   • Drug use: No   • Sexual activity: Defer     Other Topics Concern   • Not on file     Social History Narrative   • No narrative on file         Current Outpatient Prescriptions   Medication Sig Dispense Refill   • DULoxetine (CYMBALTA) 30 MG capsule Take 30 mg by mouth.     • nabumetone (RELAFEN) 500 MG tablet Take 1 tablet by mouth 2 (Two) Times a Day As  "Needed for Mild Pain . 60 tablet 0   • oxyCODONE-acetaminophen (PERCOCET) 7.5-325 MG per tablet Take 1 tablet by mouth Every 6 (Six) Hours As Needed for Moderate Pain  or Severe Pain . 40 tablet 0     No current facility-administered medications for this visit.          OBJECTIVE    Ht 160 cm (63\")   Wt 83.9 kg (184 lb 15.5 oz)   BMI 32.77 kg/m²       Review of Systems   Constitutional: Negative.    HENT: Negative.    Respiratory: Negative.    Cardiovascular: Negative.    Gastrointestinal: Negative.    Musculoskeletal: Positive for arthralgias and back pain.        Foot pain   All other systems reviewed and are negative.        Physical Exam   Constitutional: she appears well-developed and well-nourished.   HEENT: Normocephalic. Atraumatic  CV: No CP. RRR  Resp: Non-labored respirations. CTAB  Abd: Soft, non-tender  Lymphatic: No lymphadenopathy  Psychiatric: she has a normal mood and affect. her behavior is normal.         Lower Extremity Exam:  Vascular: DP/PT pulses palpable 2+.   Minimal right foot edema  Foot warm  CFT wnl  Negative abigail  Neuro: Protective sensation intact, b/l.  DTRs intact  Integument: No open wounds or lesions.  Right foot incsion well healed. No SOI  No erythema, scaling  Skin quality normal  Musculoskeletal: LE muscle strength 5/5.   Gait normal  Ankle ROM decreased without pain or crepitus  STJ ROM full without pain or crepitus  1st MTPJ rectus, rigid            ASSESSMENT AND PLAN    Brenda was seen today for post-op follow-up.    Diagnoses and all orders for this visit:    Foot pain, right  -     XR Foot 3+ View Right      -Comprehensive foot and ankle exam performed  -Radiographs ordered and reviewed. Stable hardware with progressive healing noted  -May begin to wean cam boot over the next week to stable stiff soled athletic shoe.  -Recheck 3 weeks          This document has been electronically signed by Carol Burdick MA on March 13, 2018 11:18 AM     EMR Dragon/Transcription " disclaimer:   Much of this encounter note is an electronic transcription/translation of spoken language to printed text. The electronic translation of spoken language may permit erroneous, or at times, nonsensical words or phrases to be inadvertently transcribed; Although I have reviewed the note for such errors, some may still exist.    Carol Burdick MA  3/13/2018  11:18 AM

## 2018-03-19 RX ORDER — NABUMETONE 500 MG/1
TABLET, FILM COATED ORAL
Qty: 60 TABLET | Refills: 0 | Status: SHIPPED | OUTPATIENT
Start: 2018-03-19 | End: 2018-07-02

## 2018-04-09 ENCOUNTER — OFFICE VISIT (OUTPATIENT)
Dept: PODIATRY | Facility: CLINIC | Age: 58
End: 2018-04-09

## 2018-04-09 VITALS — BODY MASS INDEX: 32.77 KG/M2 | HEIGHT: 63 IN | WEIGHT: 184.97 LBS

## 2018-04-09 DIAGNOSIS — M20.21 HALLUX RIGIDUS OF RIGHT FOOT: Primary | ICD-10-CM

## 2018-04-09 DIAGNOSIS — M79.2 NEURITIS: ICD-10-CM

## 2018-04-09 DIAGNOSIS — M21.629 TAILOR'S BUNION: ICD-10-CM

## 2018-04-09 PROCEDURE — 99024 POSTOP FOLLOW-UP VISIT: CPT | Performed by: PODIATRIST

## 2018-04-09 NOTE — PROGRESS NOTES
Brenda David  1960  57 y.o. female  Patient presents today for right foot post-op.  2018          Chief Complaint   Patient presents with   • Right Foot - Post-op Follow-up           History of Present Illness    Brenda David is a 57 y.o. female who presents for Follow-up of right first metatarsal phalangeal joint arthrodesis.  Date of surgery 2018.  She is doing well. Continues to note mild hyperesthesia to the inside of her big toe.  She has Transitioned to weightbearing as tolerated in regular shoes without issue.  She does note mild increase in pain and blistering to the outside of her foot.    Past Medical History:   Diagnosis Date   • Arthritis    • History of     • Ingrown toenail    • Plantar fasciitis          Past Surgical History:   Procedure Laterality Date   • BACK SURGERY     •  SECTION     • HYSTERECTOMY     • MTP JOINT FUSION Right 2018    Procedure: FIRST METATARSOPHALANGEAL JOINT ARTHRODESIS       (C-ARM)                  [facility]EX [facility];  Surgeon: Kwadwo Jensen DPM;  Location: [facility];  Service:    • SINUS SURGERY           Family History   Problem Relation Age of Onset   • Heart disease Mother    • Hypertension Mother    • Thyroid disease Mother    • Heart disease Father    • Hypertension Father    • Thyroid disease Father    • Thyroid disease Sister          Social History     Social History   • Marital status:      Spouse name: N/A   • Number of children: N/A   • Years of education: N/A     Occupational History   • Not on file.     Social History Main Topics   • Smoking status: Former Smoker   • Smokeless tobacco: Never Used   • Alcohol use No   • Drug use: No   • Sexual activity: Defer     Other Topics Concern   • Not on file     Social History Narrative   • No narrative on file         Current Outpatient Prescriptions   Medication Sig Dispense Refill   • DULoxetine (CYMBALTA) 30 MG capsule Take 30 mg by mouth.  "    • nabumetone (RELAFEN) 500 MG tablet TAKE 1 TABLET BY MOUTH TWICE DAILY AS NEEDED FOR MILD PAIN 60 tablet 0     No current facility-administered medications for this visit.          OBJECTIVE    Ht 160 cm (63\")   Wt 83.9 kg (184 lb 15.5 oz)   BMI 32.77 kg/m²       Review of Systems   Constitutional: Negative.    HENT: Negative.    Respiratory: Negative.    Cardiovascular: Negative.    Gastrointestinal: Negative.    Musculoskeletal: Positive for arthralgias and back pain.        Foot pain   All other systems reviewed and are negative.        Physical Exam   Constitutional: she appears well-developed and well-nourished.   HEENT: Normocephalic. Atraumatic  CV: No CP. RRR  Resp: Non-labored respirations. CTAB  Abd: Soft, non-tender  Lymphatic: No lymphadenopathy  Psychiatric: she has a normal mood and affect. her behavior is normal.         Lower Extremity Exam:  Vascular: DP/PT pulses palpable 2+.   Minimal right foot edema  Foot warm  CFT wnl  Negative abigail  Neuro: Protective sensation intact, b/l.  DTRs intact  Integument: No open wounds or lesions.  Right foot incsion well healed. No SOI  Desiccated bulla to dorsal lateral fifth MTPJ on right.  Skin quality normal  Musculoskeletal: LE muscle strength 5/5.   Gait normal  Ankle ROM decreased without pain or crepitus  STJ ROM full without pain or crepitus  1st MTPJ rectus, rigid  Tailor's bunion on right          ASSESSMENT AND PLAN    Brenda was seen today for post-op follow-up.    Diagnoses and all orders for this visit:    Hallux rigidus of right foot  -     XR Foot 3+ View Right      -Comprehensive foot and ankle exam performed  -Radiographs ordered and reviewed. Stable hardware with progressive healing noted  -Continue regular shoes.  Increase activity as tolerated.  -Bunion guard dispensed for right foot tailors bunion  -Discussed that hyperesthesia to right big toe should be transient and continue to improve over the next couple of months.  In the " meantime I did advise her to increase her Cymbalta from once daily to twice a day.  -Recheck 1 month          This document has been electronically signed by Carol Burdick MA on April 9, 2018 11:13 AM     EMR Dragon/Transcription disclaimer:   Much of this encounter note is an electronic transcription/translation of spoken language to printed text. The electronic translation of spoken language may permit erroneous, or at times, nonsensical words or phrases to be inadvertently transcribed; Although I have reviewed the note for such errors, some may still exist.    Carol Burdick MA  4/9/2018  11:13 AM

## 2018-05-10 ENCOUNTER — OFFICE VISIT (OUTPATIENT)
Dept: PODIATRY | Facility: CLINIC | Age: 58
End: 2018-05-10

## 2018-05-10 VITALS — WEIGHT: 184.97 LBS | HEIGHT: 63 IN | BODY MASS INDEX: 32.77 KG/M2

## 2018-05-10 DIAGNOSIS — M20.21 HALLUX RIGIDUS OF RIGHT FOOT: Primary | ICD-10-CM

## 2018-05-10 PROCEDURE — 99212 OFFICE O/P EST SF 10 MIN: CPT | Performed by: PODIATRIST

## 2018-05-10 NOTE — PROGRESS NOTES
Brenda David  1960  57 y.o. female  Patient presents today for right foot post-op.  05/10/2018      Chief Complaint   Patient presents with   • Right Foot - Post-op Follow-up           History of Present Illness    Brenda David is a 57 y.o. female who presents for Follow-up of right first metatarsal phalangeal joint arthrodesis.  Date of surgery 2018.  She is doing well. Continues to note mild hyperesthesia to the inside of her big toe.  She has Transitioned to weightbearing as tolerated in regular shoes without issue.  She states that overall her pain is nearly resolved and the hyper sensitivity of the inside of her toe is improving.    Past Medical History:   Diagnosis Date   • Arthritis    • History of     • Ingrown toenail    • Plantar fasciitis          Past Surgical History:   Procedure Laterality Date   • BACK SURGERY     •  SECTION     • HYSTERECTOMY     • MTP JOINT FUSION Right 2018    Procedure: FIRST METATARSOPHALANGEAL JOINT ARTHRODESIS       (C-ARM)                  [facility];  Surgeon: Kwadwo Jensen DPM;  Location: [facility];  Service:    • SINUS SURGERY           Family History   Problem Relation Age of Onset   • Heart disease Mother    • Hypertension Mother    • Thyroid disease Mother    • Heart disease Father    • Hypertension Father    • Thyroid disease Father    • Thyroid disease Sister          Social History     Social History   • Marital status:      Spouse name: N/A   • Number of children: N/A   • Years of education: N/A     Occupational History   • Not on file.     Social History Main Topics   • Smoking status: Former Smoker   • Smokeless tobacco: Never Used   • Alcohol use No   • Drug use: No   • Sexual activity: Defer     Other Topics Concern   • Not on file     Social History Narrative   • No narrative on file         Current Outpatient Prescriptions   Medication Sig Dispense Refill   • DULoxetine (CYMBALTA)  "30 MG capsule Take 30 mg by mouth.     • nabumetone (RELAFEN) 500 MG tablet TAKE 1 TABLET BY MOUTH TWICE DAILY AS NEEDED FOR MILD PAIN 60 tablet 0     No current facility-administered medications for this visit.          OBJECTIVE    Ht 160 cm (63\")   Wt 83.9 kg (184 lb 15.5 oz)   BMI 32.77 kg/m²       Review of Systems   Constitutional: Negative.    HENT: Negative.    Respiratory: Negative.    Cardiovascular: Negative.    Gastrointestinal: Negative.    Musculoskeletal: Positive for arthralgias and back pain.        Foot pain   All other systems reviewed and are negative.        Physical Exam   Constitutional: she appears well-developed and well-nourished.   HEENT: Normocephalic. Atraumatic  CV: No CP. RRR  Resp: Non-labored respirations. CTAB  Abd: Soft, non-tender  Lymphatic: No lymphadenopathy  Psychiatric: she has a normal mood and affect. her behavior is normal.         Lower Extremity Exam:  Vascular: DP/PT pulses palpable 2+.   Minimal right foot edema  Foot warm  CFT wnl  Negative abigail  Neuro: Protective sensation intact, b/l.  DTRs intact  Integument: No open wounds or lesions.  Right foot incsion well healed. No SOI  Desiccated bulla to dorsal lateral fifth MTPJ on right.  Skin quality normal  Musculoskeletal: LE muscle strength 5/5.   Gait normal  Ankle ROM decreased without pain or crepitus  STJ ROM full without pain or crepitus  1st MTPJ rectus, rigid  Tailor's bunion on right          ASSESSMENT AND PLAN    Brenda was seen today for post-op follow-up.    Diagnoses and all orders for this visit:    Hallux rigidus of right foot  -     XR Foot 3+ View Right      -Comprehensive foot and ankle exam performed  -Radiographs ordered and reviewed. Stable hardware with progressive healing noted  -Continue regular shoes.  Increase activity as tolerated.  -Discussed that hyperesthesia to right big toe should be transient and continue to improve over the next couple of months. If pain persists she may require " hardware removal in the future  -Recheck as needed          This document has been electronically signed by Kwadwo Jensen DPM on May 10, 2018 1:03 PM     EMR Dragon/Transcription disclaimer:   Much of this encounter note is an electronic transcription/translation of spoken language to printed text. The electronic translation of spoken language may permit erroneous, or at times, nonsensical words or phrases to be inadvertently transcribed; Although I have reviewed the note for such errors, some may still exist.    Kwadwo Jensen DPM  5/10/2018  1:03 PM

## 2018-05-25 RX ORDER — DULOXETIN HYDROCHLORIDE 30 MG/1
30 CAPSULE, DELAYED RELEASE ORAL DAILY
Qty: 30 CAPSULE | Refills: 11 | Status: SHIPPED | OUTPATIENT
Start: 2018-05-25 | End: 2018-07-02 | Stop reason: DRUGHIGH

## 2018-05-25 NOTE — TELEPHONE ENCOUNTER
MISSAEL      LEFT A VOICEMAIL TO SAY THAT SHE SPOKE TO HER PRIMARY AND WAS ADVISED TO ASK YOU FOR A REFILL OF HER CYMBALTA.    THANK.S

## 2018-07-02 ENCOUNTER — OFFICE VISIT (OUTPATIENT)
Dept: FAMILY MEDICINE CLINIC | Facility: CLINIC | Age: 58
End: 2018-07-02

## 2018-07-02 VITALS
DIASTOLIC BLOOD PRESSURE: 80 MMHG | HEART RATE: 67 BPM | BODY MASS INDEX: 30.84 KG/M2 | OXYGEN SATURATION: 96 % | HEIGHT: 63 IN | WEIGHT: 174.06 LBS | SYSTOLIC BLOOD PRESSURE: 126 MMHG

## 2018-07-02 DIAGNOSIS — F43.21 GRIEF REACTION: ICD-10-CM

## 2018-07-02 DIAGNOSIS — G47.00 INSOMNIA, UNSPECIFIED TYPE: Primary | ICD-10-CM

## 2018-07-02 DIAGNOSIS — M19.90 ARTHRITIS: ICD-10-CM

## 2018-07-02 PROCEDURE — 99202 OFFICE O/P NEW SF 15 MIN: CPT | Performed by: FAMILY MEDICINE

## 2018-07-02 RX ORDER — HYDROXYZINE HYDROCHLORIDE 10 MG/1
TABLET, FILM COATED ORAL
Qty: 30 TABLET | Refills: 1 | Status: SHIPPED | OUTPATIENT
Start: 2018-07-02 | End: 2018-08-02

## 2018-07-02 RX ORDER — DULOXETIN HYDROCHLORIDE 60 MG/1
1 CAPSULE, DELAYED RELEASE ORAL DAILY
COMMUNITY
Start: 2018-05-31 | End: 2018-08-17

## 2018-07-02 RX ORDER — TRAMADOL HYDROCHLORIDE 50 MG/1
50 TABLET ORAL EVERY 6 HOURS PRN
Qty: 120 TABLET | Refills: 0 | Status: SHIPPED | OUTPATIENT
Start: 2018-07-02 | End: 2018-10-05

## 2018-07-02 RX ORDER — TRAMADOL HYDROCHLORIDE 50 MG/1
50 TABLET ORAL EVERY 6 HOURS PRN
COMMUNITY
Start: 2018-01-22 | End: 2018-07-02 | Stop reason: SDUPTHER

## 2018-07-02 NOTE — PROGRESS NOTES
Subjective   Brenda David is a 58 y.o. female.     History of Present Illness The patient comes in for check of their chronic medical issues.Her mother has recently  . She is having issues sleeping.She is having some difficulty concentration. She also needs her Ultram refilled. She takes this for chronic pain.      The following portions of the patient's history were reviewed and updated as appropriate: allergies, current medications, past family history, past medical history, past social history, past surgical history and problem list.    Review of Systems   Constitutional: Negative for fever.   Respiratory: Negative for cough, chest tightness and wheezing.    Psychiatric/Behavioral: Positive for sleep disturbance. Negative for dysphoric mood.       Objective   Physical Exam   Constitutional: She appears well-developed and well-nourished.   HENT:   Head: Normocephalic and atraumatic.   Right Ear: External ear normal.   Left Ear: External ear normal.   Nose: Nose normal.   Mouth/Throat: Oropharynx is clear and moist.   Eyes: Pupils are equal, round, and reactive to light.   Neck: Normal range of motion.   Cardiovascular: Normal rate, regular rhythm and normal heart sounds.  Exam reveals no gallop and no friction rub.    No murmur heard.  Pulmonary/Chest: Effort normal and breath sounds normal. No respiratory distress. She has no wheezes. She has no rales.   Abdominal: Soft. Bowel sounds are normal.   Skin: Skin is warm and dry.   Vitals reviewed.        Assessment/Plan   Brenda was seen today for establish care.    Diagnoses and all orders for this visit:    Insomnia, unspecified type    Grief reaction    Arthritis    Other orders  -     traMADol (ULTRAM) 50 MG tablet; Take 1 tablet by mouth Every 6 (Six) Hours As Needed for Moderate Pain .  -     hydrOXYzine (ATARAX) 10 MG tablet; One po qhs        Will continue with current medications. Will refill Tramadol.  Will start atarax.  Return to clinic in 3  months.  Will contact with results as needed.

## 2018-07-06 ENCOUNTER — TELEPHONE (OUTPATIENT)
Dept: FAMILY MEDICINE CLINIC | Facility: CLINIC | Age: 58
End: 2018-07-06

## 2018-07-06 DIAGNOSIS — R52 PAIN MANAGEMENT: Primary | ICD-10-CM

## 2018-07-06 NOTE — TELEPHONE ENCOUNTER
Called to let patient know that if she wants to continue the tramadol Dr. Mckeon will have to send her to pain management she said that she would prefer to see Jessi Frank at [facility]

## 2018-07-10 ENCOUNTER — TELEPHONE (OUTPATIENT)
Dept: FAMILY MEDICINE CLINIC | Facility: CLINIC | Age: 58
End: 2018-07-10

## 2018-07-10 NOTE — TELEPHONE ENCOUNTER
Patient called and left a voice mail stating she doesn't want to go to pain management and that she wants the appointment cancelled

## 2018-08-01 DIAGNOSIS — M25.562 PAIN IN BOTH KNEES, UNSPECIFIED CHRONICITY: Primary | ICD-10-CM

## 2018-08-01 DIAGNOSIS — M25.561 PAIN IN BOTH KNEES, UNSPECIFIED CHRONICITY: Primary | ICD-10-CM

## 2018-08-02 ENCOUNTER — OFFICE VISIT (OUTPATIENT)
Dept: ORTHOPEDIC SURGERY | Facility: CLINIC | Age: 58
End: 2018-08-02

## 2018-08-02 VITALS — BODY MASS INDEX: 31.89 KG/M2 | WEIGHT: 180 LBS | HEIGHT: 63 IN

## 2018-08-02 DIAGNOSIS — M25.561 PAIN IN BOTH KNEES, UNSPECIFIED CHRONICITY: Primary | ICD-10-CM

## 2018-08-02 DIAGNOSIS — M23.91 INTERNAL DERANGEMENT OF RIGHT KNEE: ICD-10-CM

## 2018-08-02 DIAGNOSIS — M25.562 PAIN IN BOTH KNEES, UNSPECIFIED CHRONICITY: Primary | ICD-10-CM

## 2018-08-02 PROCEDURE — 99203 OFFICE O/P NEW LOW 30 MIN: CPT | Performed by: ORTHOPAEDIC SURGERY

## 2018-08-02 RX ORDER — IBUPROFEN 200 MG
200 TABLET ORAL EVERY 6 HOURS PRN
COMMUNITY
End: 2018-08-17

## 2018-08-02 RX ORDER — ACETAMINOPHEN 500 MG
500 TABLET ORAL EVERY 6 HOURS PRN
COMMUNITY
End: 2018-08-17

## 2018-08-02 RX ORDER — MELOXICAM 15 MG/1
15 TABLET ORAL DAILY
Qty: 30 TABLET | Refills: 1 | Status: SHIPPED | OUTPATIENT
Start: 2018-08-02 | End: 2018-08-02 | Stop reason: SDUPTHER

## 2018-08-02 RX ORDER — MELOXICAM 15 MG/1
15 TABLET ORAL DAILY
Qty: 90 TABLET | Refills: 1 | Status: SHIPPED | OUTPATIENT
Start: 2018-08-02 | End: 2018-08-17

## 2018-08-02 NOTE — PROGRESS NOTES
Brenda David is a 58 y.o. female   Primary provider:  Papi Mckeon MD       Chief Complaint   Patient presents with   • Left Knee - Pain   • Right Knee - Pain       HISTORY OF PRESENT ILLNESS: Patient being seen for bilateral knee pain due to injury occurring 2018. X-rays done today. Patient states right knee is more painful 5/10 and left knee pain is 2/10. Pain increases when going up/down stairs.   Patient states that she is tripping quite a bit and has some episodes of falling.  She has had cataracts and thinks that that is the culprit for her losing her balance.  She has problems going up and down stairs with pain but states that she has had back surgery which also makes it difficult for stairs.  She's having bilateral knee pain for about the last 2-3 months.  Mostly she has pain on the inside of both knees.  She is wearing brace intermittently.  She is taking anti-inflammatory medicines as well.  No specific injury that she recalls.  She is having pain with pivoting and twisting.    Pain   This is a new problem. The current episode started more than 1 month ago. The problem occurs constantly. Associated symptoms include joint swelling. Associated symptoms comments: Crushing, stabbing, aching, clicking, swelling. The symptoms are aggravated by standing and walking (sitting, driving). She has tried ice for the symptoms.        CONCURRENT MEDICAL HISTORY:    Past Medical History:   Diagnosis Date   • Anxiety    • Arthritis    • History of     • Ingrown toenail    • Plantar fasciitis        Allergies   Allergen Reactions   • Bactrim [Sulfamethoxazole-Trimethoprim] Hives   • Adhesive Tape Other (See Comments)     BLISTERS   • Amoxicillin-Pot Clavulanate Diarrhea   • Latex Rash   • Pregabalin Hives   • Sulfa Antibiotics Hives         Current Outpatient Prescriptions:   •  acetaminophen (TYLENOL) 500 MG tablet, Take 500 mg by mouth Every 6 (Six) Hours As Needed for Mild Pain ., Disp: ,  Rfl:   •  BIOTIN PO, Take  by mouth., Disp: , Rfl:   •  Cholecalciferol (VITAMIN D PO), Take  by mouth., Disp: , Rfl:   •  DULoxetine (CYMBALTA) 60 MG capsule, Take 1 capsule by mouth Daily., Disp: , Rfl:   •  Glucosamine-Chondroit-Vit C-Mn (GLUCOSAMINE CHONDR 500 COMPLEX PO), Take  by mouth., Disp: , Rfl:   •  ibuprofen (ADVIL,MOTRIN) 200 MG tablet, Take 200 mg by mouth Every 6 (Six) Hours As Needed for Mild Pain ., Disp: , Rfl:   •  MAGNESIUM PO, Take  by mouth., Disp: , Rfl:   •  Multiple Vitamin (MULTI-VITAMIN DAILY PO), Take  by mouth., Disp: , Rfl:   •  traMADol (ULTRAM) 50 MG tablet, Take 1 tablet by mouth Every 6 (Six) Hours As Needed for Moderate Pain ., Disp: 120 tablet, Rfl: 0  •  meloxicam (MOBIC) 15 MG tablet, TAKE 1 TABLET BY MOUTH DAILY, Disp: 90 tablet, Rfl: 1    Past Surgical History:   Procedure Laterality Date   • BACK SURGERY     •  SECTION     • HYSTERECTOMY     • MTP JOINT FUSION Right 2018    Procedure: FIRST METATARSOPHALANGEAL JOINT ARTHRODESIS       (C-ARM)                  LATEX ALLERGY;  Surgeon: Kwadwo Jensen DPM;  Location: [facility];  Service:    • SINUS SURGERY         Family History   Problem Relation Age of Onset   • Heart disease Mother    • Hypertension Mother    • Thyroid disease Mother    • Heart disease Father    • Hypertension Father    • Thyroid disease Father    • Thyroid disease Sister        Social History     Social History   • Marital status:      Spouse name: N/A   • Number of children: N/A   • Years of education: N/A     Occupational History   • Not on file.     Social History Main Topics   • Smoking status: Former Smoker   • Smokeless tobacco: Never Used   • Alcohol use Yes      Comment: socially   • Drug use: No   • Sexual activity: Defer     Other Topics Concern   • Not on file     Social History Narrative   • No narrative on file        Review of Systems   Eyes: Positive for visual disturbance.   Musculoskeletal: Positive  "for joint swelling.        Muscle pain   Psychiatric/Behavioral: Positive for sleep disturbance.   All other systems reviewed and are negative.      PHYSICAL EXAMINATION:       Ht 160 cm (63\")   Wt 81.6 kg (180 lb)   LMP 07/02/2002 (Within Months)   BMI 31.89 kg/m²     Physical Exam   Constitutional: She is oriented to person, place, and time. She appears well-developed and well-nourished.   Neurological: She is alert and oriented to person, place, and time.   Psychiatric: She has a normal mood and affect. Her behavior is normal. Judgment and thought content normal.       GAIT:     [x]  Normal  []  Antalgic    Assistive device: [x]  None  []  Walker     []  Crutches  []  Cane     []  Wheelchair  []  Stretcher    Right Knee Exam     Tenderness   The patient is experiencing tenderness in the medial joint line and medial retinaculum (Moderate medial joint space tenderness).    Range of Motion   Extension: 0   Flexion: 110     Muscle Strength     The patient has normal right knee strength.    Tests   Lana:  Medial - positive   Drawer:       Anterior - negative      Varus: negative  Valgus: negative    Other   Erythema: absent  Sensation: normal  Pulse: present  Swelling: none      Left Knee Exam     Tenderness   Left knee tenderness location: Mild medial joint space tenderness.    Range of Motion   Extension: normal   Flexion: normal     Muscle Strength     The patient has normal left knee strength.    Tests   Lana:  Medial - negative Lateral - negative  Drawer:       Anterior - negative         Other   Erythema: absent  Sensation: normal  Pulse: present  Swelling: none                  Xr Knee Bilateral Ap Standing    Result Date: 8/2/2018  Narrative: Ordering Provider:  Carlos Manuel Sanchez MD Ordering Diagnosis/Indication:  Pain in both knees, unspecified chronicity Procedure:  XR KNEE BILATERAL AP STANDING Exam Date:  8/2/18 COMPARISON:  Not applicable, no relevant images available.     Impression:   " AP bilateral standing of the knees with lateral of each knee shows acceptable position and alignment of both knees with no evidence of acute bony abnormality.  No fracture or dislocation is noted.  No significant arthritic changes are noted.  No arthritic changes noted in the patellofemoral compartment either. Carlos Manuel Sanchez MD 8/2/18     Xr Knee 1 Or 2 View Bilateral    Result Date: 8/2/2018  Narrative: Ordering Provider:  Carlos Manuel Sanchez MD Ordering Diagnosis/Indication:  Pain in both knees, unspecified chronicity Procedure:  XR KNEE 1 OR 2 VW BILATERAL Exam Date:  8/2/18 COMPARISON:  Not applicable, no relevant images available.     Impression:  AP bilateral standing of the knees with lateral of each knee shows acceptable position and alignment of both knees with no evidence of acute bony abnormality.  No fracture or dislocation is noted.  No significant arthritic changes are noted.  No arthritic changes noted in the patellofemoral compartment either. Carlos Manuel Sanchez MD 8/2/18           ASSESSMENT:    Diagnoses and all orders for this visit:    Pain in both knees, unspecified chronicity  -     MRI Knee Right Without Contrast; Future    Internal derangement of right knee  -     MRI Knee Right Without Contrast; Future    Other orders  -     Multiple Vitamin (MULTI-VITAMIN DAILY PO); Take  by mouth.  -     Cholecalciferol (VITAMIN D PO); Take  by mouth.  -     MAGNESIUM PO; Take  by mouth.  -     Glucosamine-Chondroit-Vit C-Mn (GLUCOSAMINE CHONDR 500 COMPLEX PO); Take  by mouth.  -     BIOTIN PO; Take  by mouth.  -     acetaminophen (TYLENOL) 500 MG tablet; Take 500 mg by mouth Every 6 (Six) Hours As Needed for Mild Pain .  -     ibuprofen (ADVIL,MOTRIN) 200 MG tablet; Take 200 mg by mouth Every 6 (Six) Hours As Needed for Mild Pain .  -     Discontinue: meloxicam (MOBIC) 15 MG tablet; Take 1 tablet by mouth Daily.          PLAN    We discussed trial of a different anti-inflammatory  medication.  We will begin meloxicam.  We also discussed activity modification and avoiding pivoting and twisting as well as deep knee bends.  She has pain for 2-3 months and is exquisitely tender along the medial joint line.  We discussed MRI of the right knee to assess for internal derangement and to help determine further treatment options.    Patient's Body mass index is 31.89 kg/m². BMI is above normal parameters. Recommendations include: exercise counseling and nutrition counseling.      Return for recheck for MRI results.    Carlo sManuel Sanchez MD

## 2018-08-06 ENCOUNTER — HOSPITAL ENCOUNTER (OUTPATIENT)
Dept: MRI IMAGING | Facility: HOSPITAL | Age: 58
Discharge: HOME | End: 2018-08-06
Attending: ORTHOPAEDIC SURGERY | Admitting: ORTHOPAEDIC SURGERY

## 2018-08-06 DIAGNOSIS — M25.562 PAIN IN BOTH KNEES, UNSPECIFIED CHRONICITY: ICD-10-CM

## 2018-08-06 DIAGNOSIS — M23.91 INTERNAL DERANGEMENT OF RIGHT KNEE: ICD-10-CM

## 2018-08-06 DIAGNOSIS — M25.561 PAIN IN BOTH KNEES, UNSPECIFIED CHRONICITY: ICD-10-CM

## 2018-08-06 PROCEDURE — 73721 MRI JNT OF LWR EXTRE W/O DYE: CPT

## 2018-08-09 ENCOUNTER — OFFICE VISIT (OUTPATIENT)
Dept: ORTHOPEDIC SURGERY | Facility: CLINIC | Age: 58
End: 2018-08-09

## 2018-08-09 VITALS — BODY MASS INDEX: 30.83 KG/M2 | WEIGHT: 174 LBS | HEIGHT: 63 IN

## 2018-08-09 DIAGNOSIS — M23.91 INTERNAL DERANGEMENT OF RIGHT KNEE: ICD-10-CM

## 2018-08-09 DIAGNOSIS — M94.261 CHONDROMALACIA, RIGHT KNEE: ICD-10-CM

## 2018-08-09 DIAGNOSIS — S83.241A TEAR OF MEDIAL MENISCUS OF RIGHT KNEE, CURRENT, UNSPECIFIED TEAR TYPE, INITIAL ENCOUNTER: Primary | ICD-10-CM

## 2018-08-09 DIAGNOSIS — M71.21 BAKER'S CYST OF KNEE, RIGHT: ICD-10-CM

## 2018-08-09 DIAGNOSIS — M25.561 PAIN IN BOTH KNEES, UNSPECIFIED CHRONICITY: ICD-10-CM

## 2018-08-09 DIAGNOSIS — M25.562 PAIN IN BOTH KNEES, UNSPECIFIED CHRONICITY: ICD-10-CM

## 2018-08-09 PROCEDURE — 99214 OFFICE O/P EST MOD 30 MIN: CPT | Performed by: ORTHOPAEDIC SURGERY

## 2018-08-09 NOTE — PROGRESS NOTES
"Brenda David is a 58 y.o. female returns for     Chief Complaint   Patient presents with   • Right Knee - Follow-up, Pain   • Results     mri right knee       HISTORY OF PRESENT ILLNESS:  She has pain with pivoting and twisting.  Pain with deep knee bends.  She has pain with daily activity.  It is not improved over the past several weeks.  She states that she has fallen multiple times due to losing her balance because of the sharp stabbing pains that she has in the right knee.     CONCURRENT MEDICAL HISTORY:    The following portions of the patient's history were reviewed and updated as appropriate: allergies, current medications, past family history, past medical history, past social history, past surgical history and problem list.     ROS  No fevers or chills.  No chest pain or shortness of air.  No GI or  disturbances.  Other than right knee pain, all other systems reviewed as negative.    PHYSICAL EXAMINATION:       Ht 160 cm (63\")   Wt 78.9 kg (174 lb)   LMP 07/02/2002 (Within Months)   BMI 30.82 kg/m²     Physical Exam   Constitutional: She is oriented to person, place, and time. She appears well-developed and well-nourished. No distress.   Cardiovascular: Normal rate, regular rhythm and normal heart sounds.    Pulmonary/Chest: Effort normal and breath sounds normal.   Abdominal: Soft. Bowel sounds are normal.   Musculoskeletal:        Right knee: She exhibits no effusion.   Neurological: She is alert and oriented to person, place, and time.   Psychiatric: She has a normal mood and affect. Her behavior is normal. Judgment and thought content normal.   Vitals reviewed.      GAIT:     []  Normal  [x]  Antalgic    Assistive device: [x]  None  []  Walker     []  Crutches  []  Cane     []  Wheelchair  []  Stretcher    Right Knee Exam     Tenderness   The patient is experiencing tenderness in the medial joint line and medial retinaculum (Moderate medial joint space tenderness).    Range of Motion "   Extension: 0   Flexion: 110     Muscle Strength     The patient has normal right knee strength.    Tests   Lana:  Medial - positive   Drawer:       Anterior - negative      Varus: negative  Valgus: negative    Other   Erythema: absent  Sensation: normal  Pulse: present  Swelling: mild  Other tests: no effusion present      Left Knee Exam     Tenderness   Left knee tenderness location: Mild medial joint space tenderness.    Range of Motion   Extension: normal   Flexion: normal     Muscle Strength     The patient has normal left knee strength.    Tests   Lana:  Medial - negative Lateral - negative  Drawer:       Anterior - negative         Other   Erythema: absent  Sensation: normal  Pulse: present  Swelling: none              Xr Knee Bilateral Ap Standing    Result Date: 8/2/2018  Narrative: Ordering Provider:  Carlos Manuel Sanchez MD Ordering Diagnosis/Indication:  Pain in both knees, unspecified chronicity Procedure:  XR KNEE BILATERAL AP STANDING Exam Date:  8/2/18 COMPARISON:  Not applicable, no relevant images available.     Impression:   AP bilateral standing of the knees with lateral of each knee shows acceptable position and alignment of both knees with no evidence of acute bony abnormality.  No fracture or dislocation is noted.  No significant arthritic changes are noted.  No arthritic changes noted in the patellofemoral compartment either. Carlos Manuel Sanchez MD 8/2/18     Xr Knee 1 Or 2 View Bilateral    Result Date: 8/2/2018  Narrative: Ordering Provider:  Carlos Manuel Sanchez MD Ordering Diagnosis/Indication:  Pain in both knees, unspecified chronicity Procedure:  XR KNEE 1 OR 2 VW BILATERAL Exam Date:  8/2/18 COMPARISON:  Not applicable, no relevant images available.     Impression:  AP bilateral standing of the knees with lateral of each knee shows acceptable position and alignment of both knees with no evidence of acute bony abnormality.  No fracture or dislocation is noted.  No  significant arthritic changes are noted.  No arthritic changes noted in the patellofemoral compartment either. Carlos Manuel Sanchez MD 8/2/18     Mri Knee Right Without Contrast    Result Date: 8/6/2018  Narrative: MRI right knee. HISTORY: Right knee pain Prior exam: Knee x-ray August 2, 2018. TECHNIQUE: Multiplanar multisequence noncontrast images right knee. findings: Complex tear having both horizontal and vertical/oblique components posterior horn medial meniscus. Normal anterior horn. Normal lateral meniscus. Tiny cystic lesion medial collateral ligament posterior aspect (series 5 image 12). This may be a sequela of prior injury, small post traumatic ganglion. Normal intact anterior and posterior cruciate ligaments. Areas of grade 2 and grade III chondromalacia articular cartilage distal femur medial aspect. Small popliteal, Baker's cyst posterior medial aspect of the right knee series 3 images 16-18.     Impression: CONCLUSION: Complex tear posterior horn medial meniscus. Areas of grade 2 and grade III chondromalacia distal femur medial aspect. Small popliteal, Baker's cyst. Tiny oval cystic lesion within the posterior fibers of the medial collateral ligament probably sequela of prior injury i.e. post traumatic ganglion. MRI right knee is otherwise remarkable. Electronically signed by:  Mario Forbes MD  8/6/2018 5:17 PM CDT Workstation: [id number]            ASSESSMENT:    Diagnoses and all orders for this visit:    Tear of medial meniscus of right knee, current, unspecified tear type, initial encounter  -     Case Request; Standing  -     ceFAZolin (ANCEF) 2 g in sodium chloride 0.9 % 100 mL IVPB; Infuse 2 g into a venous catheter 1 (One) Time.  -     Case Request    Pain in both knees, unspecified chronicity  -     Case Request; Standing  -     ceFAZolin (ANCEF) 2 g in sodium chloride 0.9 % 100 mL IVPB; Infuse 2 g into a venous catheter 1 (One) Time.  -     Case Request    Internal derangement of right knee  -      Case Request; Standing  -     ceFAZolin (ANCEF) 2 g in sodium chloride 0.9 % 100 mL IVPB; Infuse 2 g into a venous catheter 1 (One) Time.  -     Case Request    Chondromalacia, right knee  -     Case Request; Standing  -     ceFAZolin (ANCEF) 2 g in sodium chloride 0.9 % 100 mL IVPB; Infuse 2 g into a venous catheter 1 (One) Time.  -     Case Request    Baker's cyst of knee, right  -     Case Request; Standing  -     ceFAZolin (ANCEF) 2 g in sodium chloride 0.9 % 100 mL IVPB; Infuse 2 g into a venous catheter 1 (One) Time.  -     Case Request    Other orders  -     Follow Anesthesia Guidelines / Standing Orders; Future  -     Provide instructions to patient regarding NPO status  -     Follow Anesthesia Guidelines / Standing Orders; Standing  -     Verify NPO Status; Standing  -     POC Glucose Once; Standing  -     Clip operative site; Standing  -     Obtain informed consent (if not collected inpatient or PAT); Standing  -     NPO After Midnight          PLAN    The patient voiced understanding of the risks, benefits, and alternative forms of treatment that were discussed and the patient consents to proceed with surgery.  All risks, benefits and alternatives were discussed.  Risks including to but not exclusive to anesthetic complications, including death, MI, CVA, infection, bleeding DVT, fracture, residual pain and need for future surgery.  This discussion was held with the patient by Carlos Manuel Sanchez MD and all questions were answered.    We discussed multiple different treatment options, however, the patient does not want to continue with sharp stabbing pains and catching and locking.  With the complex tear in the medial meniscus we discussed unlikely improvement with conservative management.  She was to proceed with surgical intervention.    Proceed with arthroscopy of the right knee including debridement of the medial meniscus.    Return for Post-operative eval.    Carlos Manuel Sanchez MD

## 2018-08-12 RX ORDER — BUPIVACAINE HCL/0.9 % NACL/PF 0.1 %
2 PLASTIC BAG, INJECTION (ML) EPIDURAL ONCE
Status: CANCELLED | OUTPATIENT
Start: 2018-08-22 | End: 2018-08-22

## 2018-08-17 ENCOUNTER — APPOINTMENT (OUTPATIENT)
Dept: PREADMISSION TESTING | Facility: HOSPITAL | Age: 58
End: 2018-08-17

## 2018-08-17 VITALS
BODY MASS INDEX: 31.89 KG/M2 | DIASTOLIC BLOOD PRESSURE: 65 MMHG | HEIGHT: 63 IN | OXYGEN SATURATION: 97 % | SYSTOLIC BLOOD PRESSURE: 124 MMHG | WEIGHT: 180 LBS | RESPIRATION RATE: 14 BRPM | HEART RATE: 75 BPM

## 2018-08-17 RX ORDER — ASCORBIC ACID 500 MG
500 TABLET ORAL DAILY
COMMUNITY
End: 2022-08-05

## 2018-08-17 RX ORDER — DULOXETIN HYDROCHLORIDE 60 MG/1
60 CAPSULE, DELAYED RELEASE ORAL DAILY
COMMUNITY
End: 2018-08-25 | Stop reason: SDUPTHER

## 2018-08-17 RX ORDER — SODIUM CHLORIDE, SODIUM GLUCONATE, SODIUM ACETATE, POTASSIUM CHLORIDE, AND MAGNESIUM CHLORIDE 526; 502; 368; 37; 30 MG/100ML; MG/100ML; MG/100ML; MG/100ML; MG/100ML
1000 INJECTION, SOLUTION INTRAVENOUS CONTINUOUS
Status: CANCELLED | OUTPATIENT
Start: 2018-08-22

## 2018-08-17 RX ORDER — MELOXICAM 15 MG/1
15 TABLET ORAL DAILY
COMMUNITY
End: 2019-02-22

## 2018-08-17 RX ORDER — ACETAMINOPHEN 500 MG
500 TABLET ORAL EVERY 6 HOURS PRN
COMMUNITY
End: 2019-02-22

## 2018-08-22 ENCOUNTER — HOSPITAL ENCOUNTER (OUTPATIENT)
Facility: HOSPITAL | Age: 58
Setting detail: HOSPITAL OUTPATIENT SURGERY
Discharge: HOME | End: 2018-08-22
Attending: ORTHOPAEDIC SURGERY | Admitting: ORTHOPAEDIC SURGERY

## 2018-08-22 ENCOUNTER — ANESTHESIA (OUTPATIENT)
Dept: PERIOP | Facility: HOSPITAL | Age: 58
End: 2018-08-22

## 2018-08-22 ENCOUNTER — ANESTHESIA EVENT (OUTPATIENT)
Dept: PERIOP | Facility: HOSPITAL | Age: 58
End: 2018-08-22

## 2018-08-22 VITALS
DIASTOLIC BLOOD PRESSURE: 59 MMHG | TEMPERATURE: 98.9 F | SYSTOLIC BLOOD PRESSURE: 126 MMHG | OXYGEN SATURATION: 95 % | RESPIRATION RATE: 18 BRPM | HEART RATE: 82 BPM | BODY MASS INDEX: 33.75 KG/M2 | HEIGHT: 63 IN | WEIGHT: 190.48 LBS

## 2018-08-22 DIAGNOSIS — S83.241A TEAR OF MEDIAL MENISCUS OF RIGHT KNEE, CURRENT, UNSPECIFIED TEAR TYPE, INITIAL ENCOUNTER: ICD-10-CM

## 2018-08-22 DIAGNOSIS — M25.561 PAIN IN BOTH KNEES, UNSPECIFIED CHRONICITY: ICD-10-CM

## 2018-08-22 DIAGNOSIS — M23.91 INTERNAL DERANGEMENT OF RIGHT KNEE: Primary | ICD-10-CM

## 2018-08-22 DIAGNOSIS — M94.261 CHONDROMALACIA, RIGHT KNEE: ICD-10-CM

## 2018-08-22 DIAGNOSIS — M25.562 PAIN IN BOTH KNEES, UNSPECIFIED CHRONICITY: ICD-10-CM

## 2018-08-22 DIAGNOSIS — M71.21 BAKER'S CYST OF KNEE, RIGHT: ICD-10-CM

## 2018-08-22 PROCEDURE — 25010000002 ONDANSETRON PER 1 MG: Performed by: NURSE ANESTHETIST, CERTIFIED REGISTERED

## 2018-08-22 PROCEDURE — 29881 ARTHRS KNE SRG MNISECTMY M/L: CPT | Performed by: ORTHOPAEDIC SURGERY

## 2018-08-22 PROCEDURE — 25010000002 PROPOFOL 10 MG/ML EMULSION: Performed by: NURSE ANESTHETIST, CERTIFIED REGISTERED

## 2018-08-22 PROCEDURE — 25010000002 MORPHINE PER 10 MG: Performed by: ORTHOPAEDIC SURGERY

## 2018-08-22 PROCEDURE — 25010000002 MIDAZOLAM PER 1 MG: Performed by: NURSE ANESTHETIST, CERTIFIED REGISTERED

## 2018-08-22 PROCEDURE — 25010000002 HYDROMORPHONE PER 4 MG: Performed by: ANESTHESIOLOGY

## 2018-08-22 PROCEDURE — 25010000002 FENTANYL CITRATE (PF) 100 MCG/2ML SOLUTION: Performed by: NURSE ANESTHETIST, CERTIFIED REGISTERED

## 2018-08-22 PROCEDURE — 25010000002 DEXAMETHASONE PER 1 MG: Performed by: NURSE ANESTHETIST, CERTIFIED REGISTERED

## 2018-08-22 RX ORDER — ONDANSETRON 2 MG/ML
4 INJECTION INTRAMUSCULAR; INTRAVENOUS ONCE AS NEEDED
Status: DISCONTINUED | OUTPATIENT
Start: 2018-08-22 | End: 2018-08-22 | Stop reason: HOSPADM

## 2018-08-22 RX ORDER — PROPOFOL 10 MG/ML
VIAL (ML) INTRAVENOUS AS NEEDED
Status: DISCONTINUED | OUTPATIENT
Start: 2018-08-22 | End: 2018-08-22 | Stop reason: SURG

## 2018-08-22 RX ORDER — BUPIVACAINE HCL/0.9 % NACL/PF 0.1 %
2 PLASTIC BAG, INJECTION (ML) EPIDURAL ONCE
Status: COMPLETED | OUTPATIENT
Start: 2018-08-22 | End: 2018-08-22

## 2018-08-22 RX ORDER — EPHEDRINE SULFATE 50 MG/ML
5 INJECTION, SOLUTION INTRAVENOUS ONCE AS NEEDED
Status: DISCONTINUED | OUTPATIENT
Start: 2018-08-22 | End: 2018-08-22 | Stop reason: HOSPADM

## 2018-08-22 RX ORDER — SODIUM CHLORIDE 9 MG/ML
INJECTION, SOLUTION INTRAVENOUS CONTINUOUS PRN
Status: DISCONTINUED | OUTPATIENT
Start: 2018-08-22 | End: 2018-08-22 | Stop reason: SURG

## 2018-08-22 RX ORDER — DIPHENHYDRAMINE HYDROCHLORIDE 50 MG/ML
12.5 INJECTION INTRAMUSCULAR; INTRAVENOUS
Status: DISCONTINUED | OUTPATIENT
Start: 2018-08-22 | End: 2018-08-22 | Stop reason: HOSPADM

## 2018-08-22 RX ORDER — LABETALOL HYDROCHLORIDE 5 MG/ML
5 INJECTION, SOLUTION INTRAVENOUS
Status: DISCONTINUED | OUTPATIENT
Start: 2018-08-22 | End: 2018-08-22 | Stop reason: HOSPADM

## 2018-08-22 RX ORDER — ONDANSETRON 4 MG/1
4 TABLET, FILM COATED ORAL ONCE AS NEEDED
Status: DISCONTINUED | OUTPATIENT
Start: 2018-08-22 | End: 2018-08-22 | Stop reason: HOSPADM

## 2018-08-22 RX ORDER — MEPERIDINE HYDROCHLORIDE 50 MG/ML
12.5 INJECTION INTRAMUSCULAR; INTRAVENOUS; SUBCUTANEOUS
Status: DISCONTINUED | OUTPATIENT
Start: 2018-08-22 | End: 2018-08-22 | Stop reason: HOSPADM

## 2018-08-22 RX ORDER — DEXAMETHASONE SODIUM PHOSPHATE 4 MG/ML
INJECTION, SOLUTION INTRA-ARTICULAR; INTRALESIONAL; INTRAMUSCULAR; INTRAVENOUS; SOFT TISSUE AS NEEDED
Status: DISCONTINUED | OUTPATIENT
Start: 2018-08-22 | End: 2018-08-22 | Stop reason: SURG

## 2018-08-22 RX ORDER — OXYCODONE HYDROCHLORIDE AND ACETAMINOPHEN 5; 325 MG/1; MG/1
1 TABLET ORAL ONCE AS NEEDED
Status: DISCONTINUED | OUTPATIENT
Start: 2018-08-22 | End: 2018-08-22 | Stop reason: HOSPADM

## 2018-08-22 RX ORDER — FENTANYL CITRATE 50 UG/ML
INJECTION, SOLUTION INTRAMUSCULAR; INTRAVENOUS AS NEEDED
Status: DISCONTINUED | OUTPATIENT
Start: 2018-08-22 | End: 2018-08-22 | Stop reason: SURG

## 2018-08-22 RX ORDER — BUPIVACAINE HYDROCHLORIDE AND EPINEPHRINE 2.5; 5 MG/ML; UG/ML
INJECTION, SOLUTION EPIDURAL; INFILTRATION; INTRACAUDAL; PERINEURAL AS NEEDED
Status: DISCONTINUED | OUTPATIENT
Start: 2018-08-22 | End: 2018-08-22 | Stop reason: HOSPADM

## 2018-08-22 RX ORDER — SODIUM CHLORIDE, SODIUM GLUCONATE, SODIUM ACETATE, POTASSIUM CHLORIDE, AND MAGNESIUM CHLORIDE 526; 502; 368; 37; 30 MG/100ML; MG/100ML; MG/100ML; MG/100ML; MG/100ML
1000 INJECTION, SOLUTION INTRAVENOUS CONTINUOUS
Status: DISCONTINUED | OUTPATIENT
Start: 2018-08-22 | End: 2018-08-22 | Stop reason: HOSPADM

## 2018-08-22 RX ORDER — LIDOCAINE HYDROCHLORIDE 20 MG/ML
INJECTION, SOLUTION INFILTRATION; PERINEURAL AS NEEDED
Status: DISCONTINUED | OUTPATIENT
Start: 2018-08-22 | End: 2018-08-22 | Stop reason: SURG

## 2018-08-22 RX ORDER — OXYCODONE HYDROCHLORIDE AND ACETAMINOPHEN 5; 325 MG/1; MG/1
1 TABLET ORAL EVERY 4 HOURS PRN
Qty: 30 TABLET | Refills: 0 | Status: SHIPPED | OUTPATIENT
Start: 2018-08-22 | End: 2018-10-05

## 2018-08-22 RX ORDER — ACETAMINOPHEN 325 MG/1
650 TABLET ORAL ONCE AS NEEDED
Status: DISCONTINUED | OUTPATIENT
Start: 2018-08-22 | End: 2018-08-22 | Stop reason: HOSPADM

## 2018-08-22 RX ORDER — PROMETHAZINE HYDROCHLORIDE 12.5 MG/1
12.5 TABLET ORAL ONCE AS NEEDED
Status: DISCONTINUED | OUTPATIENT
Start: 2018-08-22 | End: 2018-08-22 | Stop reason: HOSPADM

## 2018-08-22 RX ORDER — FLUMAZENIL 0.1 MG/ML
0.2 INJECTION INTRAVENOUS AS NEEDED
Status: DISCONTINUED | OUTPATIENT
Start: 2018-08-22 | End: 2018-08-22 | Stop reason: HOSPADM

## 2018-08-22 RX ORDER — MORPHINE SULFATE 10 MG/ML
INJECTION, SOLUTION INTRAMUSCULAR; INTRAVENOUS AS NEEDED
Status: DISCONTINUED | OUTPATIENT
Start: 2018-08-22 | End: 2018-08-22 | Stop reason: HOSPADM

## 2018-08-22 RX ORDER — ACETAMINOPHEN 650 MG/1
650 SUPPOSITORY RECTAL ONCE AS NEEDED
Status: DISCONTINUED | OUTPATIENT
Start: 2018-08-22 | End: 2018-08-22 | Stop reason: HOSPADM

## 2018-08-22 RX ORDER — MIDAZOLAM HYDROCHLORIDE 1 MG/ML
INJECTION INTRAMUSCULAR; INTRAVENOUS AS NEEDED
Status: DISCONTINUED | OUTPATIENT
Start: 2018-08-22 | End: 2018-08-22 | Stop reason: SURG

## 2018-08-22 RX ORDER — NALOXONE HCL 0.4 MG/ML
0.2 VIAL (ML) INJECTION AS NEEDED
Status: DISCONTINUED | OUTPATIENT
Start: 2018-08-22 | End: 2018-08-22 | Stop reason: HOSPADM

## 2018-08-22 RX ORDER — LIDOCAINE HYDROCHLORIDE 10 MG/ML
0.5 INJECTION, SOLUTION INFILTRATION; PERINEURAL ONCE AS NEEDED
Status: COMPLETED | OUTPATIENT
Start: 2018-08-22 | End: 2018-08-22

## 2018-08-22 RX ORDER — ONDANSETRON 2 MG/ML
INJECTION INTRAMUSCULAR; INTRAVENOUS AS NEEDED
Status: DISCONTINUED | OUTPATIENT
Start: 2018-08-22 | End: 2018-08-22 | Stop reason: SURG

## 2018-08-22 RX ADMIN — SODIUM CHLORIDE, SODIUM GLUCONATE, SODIUM ACETATE, POTASSIUM CHLORIDE, AND MAGNESIUM CHLORIDE 1000 ML: 526; 502; 368; 37; 30 INJECTION, SOLUTION INTRAVENOUS at 12:08

## 2018-08-22 RX ADMIN — LIDOCAINE HYDROCHLORIDE 50 MG: 20 INJECTION, SOLUTION INFILTRATION; PERINEURAL at 12:49

## 2018-08-22 RX ADMIN — PROPOFOL 50 MG: 10 INJECTION, EMULSION INTRAVENOUS at 12:50

## 2018-08-22 RX ADMIN — ONDANSETRON 4 MG: 2 INJECTION INTRAMUSCULAR; INTRAVENOUS at 13:05

## 2018-08-22 RX ADMIN — FENTANYL CITRATE 25 MCG: 50 INJECTION, SOLUTION INTRAMUSCULAR; INTRAVENOUS at 13:11

## 2018-08-22 RX ADMIN — FENTANYL CITRATE 25 MCG: 50 INJECTION, SOLUTION INTRAMUSCULAR; INTRAVENOUS at 13:25

## 2018-08-22 RX ADMIN — LIDOCAINE HYDROCHLORIDE 0.5 ML: 10 INJECTION, SOLUTION EPIDURAL; INFILTRATION; INTRACAUDAL; PERINEURAL at 12:08

## 2018-08-22 RX ADMIN — SODIUM CHLORIDE, SODIUM GLUCONATE, SODIUM ACETATE, POTASSIUM CHLORIDE, AND MAGNESIUM CHLORIDE: 526; 502; 368; 37; 30 INJECTION, SOLUTION INTRAVENOUS at 12:45

## 2018-08-22 RX ADMIN — MEPERIDINE HYDROCHLORIDE 12.5 MG: 50 INJECTION INTRAMUSCULAR; INTRAVENOUS; SUBCUTANEOUS at 13:47

## 2018-08-22 RX ADMIN — Medication 2 G: at 12:55

## 2018-08-22 RX ADMIN — PROPOFOL 150 MG: 10 INJECTION, EMULSION INTRAVENOUS at 12:49

## 2018-08-22 RX ADMIN — HYDROMORPHONE HYDROCHLORIDE 0.5 MG: 1 INJECTION, SOLUTION INTRAMUSCULAR; INTRAVENOUS; SUBCUTANEOUS at 13:55

## 2018-08-22 RX ADMIN — MIDAZOLAM 2 MG: 1 INJECTION INTRAMUSCULAR; INTRAVENOUS at 12:43

## 2018-08-22 RX ADMIN — DEXAMETHASONE SODIUM PHOSPHATE 4 MG: 4 INJECTION, SOLUTION INTRAMUSCULAR; INTRAVENOUS at 13:05

## 2018-08-22 RX ADMIN — FENTANYL CITRATE 25 MCG: 50 INJECTION, SOLUTION INTRAMUSCULAR; INTRAVENOUS at 13:04

## 2018-08-22 RX ADMIN — FENTANYL CITRATE 25 MCG: 50 INJECTION, SOLUTION INTRAMUSCULAR; INTRAVENOUS at 12:56

## 2018-08-22 RX ADMIN — MEPERIDINE HYDROCHLORIDE 12.5 MG: 50 INJECTION INTRAMUSCULAR; INTRAVENOUS; SUBCUTANEOUS at 13:52

## 2018-08-22 NOTE — ANESTHESIA POSTPROCEDURE EVALUATION
Patient: Brenda David    Procedure Summary     Date:  08/22/18 Room / Location:  [facility] OR  / [facility] OR    Anesthesia Start:  1245 Anesthesia Stop:  1341    Procedure:  KNEE ARTHROSCOPY with debridement medial meniscus     (LATEX ALLERGY)  (Right Knee) Diagnosis:       Internal derangement of right knee      Baker's cyst of knee, right      Chondromalacia, right knee      Pain in both knees, unspecified chronicity      Tear of medial meniscus of right knee, current, unspecified tear type, initial encounter      (Internal derangement of right knee [M23.91])      (Baker's cyst of knee, right [M71.21])      (Chondromalacia, right knee [M94.261])      (Pain in both knees, unspecified chronicity [M25.561, M25.562])      (Tear of medial meniscus of right knee, current, unspecified tear type, initial encounter [S83.241A])    Surgeon:  Carlos Manuel Sanchez MD Provider:  Jean Uribe CRNA    Anesthesia Type:  general ASA Status:  2          Anesthesia Type: general  Last vitals  BP   126/59 (08/22/18 1525)   Temp   98.9 °F (37.2 °C) (08/22/18 1525)   Pulse   82 (08/22/18 1525)   Resp   18 (08/22/18 1525)     SpO2   95 % (08/22/18 1525)     Post Anesthesia Care and Evaluation    Patient location during evaluation: bedside  Patient participation: complete - patient participated  Level of consciousness: awake and alert  Pain score: 0  Pain management: adequate  Airway patency: patent  Anesthetic complications: No anesthetic complications  PONV Status: none  Cardiovascular status: hemodynamically stable  Respiratory status: spontaneous ventilation  Hydration status: acceptable

## 2018-08-22 NOTE — ANESTHESIA POSTPROCEDURE EVALUATION
Patient: Brenda David    Procedure Summary     Date:  08/22/18 Room / Location:  [facility] OR  / [facility] OR    Anesthesia Start:  1245 Anesthesia Stop:  1341    Procedure:  KNEE ARTHROSCOPY with debridement medial meniscus     (LATEX ALLERGY)  (Right Knee) Diagnosis:       Internal derangement of right knee      Baker's cyst of knee, right      Chondromalacia, right knee      Pain in both knees, unspecified chronicity      Tear of medial meniscus of right knee, current, unspecified tear type, initial encounter      (Internal derangement of right knee [M23.91])      (Baker's cyst of knee, right [M71.21])      (Chondromalacia, right knee [M94.261])      (Pain in both knees, unspecified chronicity [M25.561, M25.562])      (Tear of medial meniscus of right knee, current, unspecified tear type, initial encounter [S83.241A])    Surgeon:  Carlos Manuel Sanchez MD Provider:  Jean Uribe CRNA    Anesthesia Type:  general ASA Status:  2          Anesthesia Type: general  Last vitals  BP   120/62 (08/22/18 1419)   Temp   98.1 °F (36.7 °C) (08/22/18 1419)   Pulse   83 (08/22/18 1419)   Resp   18 (08/22/18 1419)     SpO2   96 % (08/22/18 1419)     Post Anesthesia Care and Evaluation    Patient location during evaluation: PACU  Patient participation: complete - patient participated  Level of consciousness: awake and alert  Pain score: 0  Pain management: adequate  Airway patency: patent  Anesthetic complications: No anesthetic complications  PONV Status: none  Cardiovascular status: acceptable  Respiratory status: acceptable  Hydration status: acceptable

## 2018-08-22 NOTE — ANESTHESIA PREPROCEDURE EVALUATION
Anesthesia Evaluation     no history of anesthetic complications:  NPO Solid Status: > 8 hours  NPO Liquid Status: > 4 hours           Airway   Mallampati: II  TM distance: >3 FB  Neck ROM: full  no difficulty expected  Dental - normal exam     Pulmonary     breath sounds clear to auscultation  (+) a smoker Former,   Cardiovascular     Rhythm: regular  Rate: normal    (-) murmur      Neuro/Psych  (+) headaches (Migraine.), psychiatric history Anxiety,     GI/Hepatic/Renal/Endo    (+) obesity,       Musculoskeletal     Abdominal   (+) obese,     Abdomen: soft.   Substance History      OB/GYN          Other   (+) arthritis                       Anesthesia Plan    ASA 2     general     intravenous induction   Anesthetic plan and risks discussed with patient and child.

## 2018-08-27 RX ORDER — DULOXETIN HYDROCHLORIDE 60 MG/1
CAPSULE, DELAYED RELEASE ORAL
Qty: 90 CAPSULE | Refills: 0 | Status: SHIPPED | OUTPATIENT
Start: 2018-08-27 | End: 2018-11-26 | Stop reason: SDUPTHER

## 2018-09-05 ENCOUNTER — OFFICE VISIT (OUTPATIENT)
Dept: ORTHOPEDIC SURGERY | Facility: CLINIC | Age: 58
End: 2018-09-05

## 2018-09-05 VITALS — HEIGHT: 63 IN

## 2018-09-05 DIAGNOSIS — S83.241D TEAR OF MEDIAL MENISCUS OF RIGHT KNEE, CURRENT, UNSPECIFIED TEAR TYPE, SUBSEQUENT ENCOUNTER: ICD-10-CM

## 2018-09-05 DIAGNOSIS — M23.91 INTERNAL DERANGEMENT OF RIGHT KNEE: Primary | ICD-10-CM

## 2018-09-05 DIAGNOSIS — M94.261 CHONDROMALACIA, RIGHT KNEE: ICD-10-CM

## 2018-09-05 DIAGNOSIS — Z98.890 STATUS POST ARTHROSCOPIC SURGERY OF RIGHT KNEE: ICD-10-CM

## 2018-09-05 PROCEDURE — 99024 POSTOP FOLLOW-UP VISIT: CPT | Performed by: NURSE PRACTITIONER

## 2018-09-05 NOTE — PROGRESS NOTES
"Brenda David is a 58 y.o. female returns for     Chief Complaint: Postoperative follow up- Right knee    HISTORY OF PRESENT ILLNESS: Patient presents to office for postoperative follow up status post right knee arthroscopy with debridement of medial meniscus. Patient is doing well postoperatively with no unusual complaints or concerns noted. Patient continues with her course of physical therapy at [facility]. Right knee pain has improved compared to preoperatively. She is ambulatory today in office with a steady gait and no assistive device. Sutures removed today.      CONCURRENT MEDICAL HISTORY:    The following portions of the patient's history were reviewed and updated as appropriate: allergies, current medications, past family history, past medical history, past social history, past surgical history and problem list.     ROS  No fevers or chills.  No chest pain or shortness of air.  No GI or  disturbances.    PHYSICAL EXAMINATION:       Ht 160 cm (63\")   LMP 07/02/2002 (Within Months)     Physical Exam   Constitutional: She is oriented to person, place, and time. Vital signs are normal. She appears well-developed and well-nourished. She is active and cooperative. She does not appear ill. No distress.   HENT:   Head: Normocephalic.   Pulmonary/Chest: Effort normal. No respiratory distress.   Abdominal: Soft. She exhibits no distension.   Musculoskeletal: She exhibits edema (Mild, right knee). She exhibits no tenderness or deformity.        Right knee: She exhibits no effusion.   Neurological: She is alert and oriented to person, place, and time. GCS eye subscore is 4. GCS verbal subscore is 5. GCS motor subscore is 6.   Skin: Skin is warm, dry and intact. Capillary refill takes less than 2 seconds. No erythema.   Psychiatric: She has a normal mood and affect. Her speech is normal and behavior is normal. Judgment and thought content normal. Cognition and memory are normal.   Vitals " reviewed.      GAIT:     [x]  Normal  []  Antalgic    Assistive device: [x]  None  []  Walker     []  Crutches  []  Cane     []  Wheelchair  []  Stretcher    Right Knee Exam     Tenderness   The patient is experiencing no tenderness.         Range of Motion   Extension: 0   Flexion: 130     Muscle Strength     The patient has normal right knee strength.    Other   Erythema: absent  Sensation: normal  Pulse: present  Swelling: mild  Other tests: no effusion present    Comments:  Arthroscopic surgical incisions are well-approximated with no erythema and no drainage. No signs of infection noted. Calf is soft and nontender. Evangelina's sign is negative. Good range of motion of the knee with minimal pain.               ASSESSMENT:    Diagnoses and all orders for this visit:    Internal derangement of right knee    Chondromalacia, right knee    Status post arthroscopic surgery of right knee    Tear of medial meniscus of right knee, current, unspecified tear type, subsequent encounter    PLAN    Patient is doing well postoperatively and progressing as expected. Right knee pain is improved. Surgical incisions are healing well with no signs of infection. Sutures removed and steri-strips placed. Wound care instructions provided, including care of steri-strips. Patient is instructed to continue to monitor the incisions for any signs of infection including increased redness, increased swelling, increased warmth, increased tenderness and/or purulent drainage. Patient is instructed to notify the office immediately if any signs of infection are noted. Continue with current course of physical therapy and home exercises. Progress activity as tolerated. Continue with elevation and ice therapy as needed to minimize pain/swelling. Follow up in 4 weeks for recheck.     Return in about 4 weeks (around 10/3/2018) for Recheck.      This document has been electronically signed by LEIDA Street on September 8, 2018 7:43 PM      Sasha TOMLIN  Guess, APRN

## 2018-09-08 PROBLEM — Z98.890 STATUS POST ARTHROSCOPIC SURGERY OF RIGHT KNEE: Status: ACTIVE | Noted: 2018-09-08

## 2018-09-24 ENCOUNTER — TELEPHONE (OUTPATIENT)
Dept: FAMILY MEDICINE CLINIC | Facility: CLINIC | Age: 58
End: 2018-09-24

## 2018-09-24 DIAGNOSIS — Z12.31 SCREENING MAMMOGRAM, ENCOUNTER FOR: Primary | ICD-10-CM

## 2018-09-24 NOTE — TELEPHONE ENCOUNTER
Patient has called to see if Dr Mckeon would put in an order for her to have a mammo done. Please call her at [phone]

## 2018-10-05 ENCOUNTER — OFFICE VISIT (OUTPATIENT)
Dept: ORTHOPEDIC SURGERY | Facility: CLINIC | Age: 58
End: 2018-10-05

## 2018-10-05 VITALS — BODY MASS INDEX: 31.71 KG/M2 | WEIGHT: 179 LBS | HEIGHT: 63 IN

## 2018-10-05 DIAGNOSIS — M94.261 CHONDROMALACIA, RIGHT KNEE: ICD-10-CM

## 2018-10-05 DIAGNOSIS — M71.21 BAKER'S CYST OF KNEE, RIGHT: ICD-10-CM

## 2018-10-05 DIAGNOSIS — S83.241D TEAR OF MEDIAL MENISCUS OF RIGHT KNEE, CURRENT, UNSPECIFIED TEAR TYPE, SUBSEQUENT ENCOUNTER: ICD-10-CM

## 2018-10-05 DIAGNOSIS — Z98.890 STATUS POST ARTHROSCOPIC SURGERY OF RIGHT KNEE: ICD-10-CM

## 2018-10-05 DIAGNOSIS — M23.91 INTERNAL DERANGEMENT OF RIGHT KNEE: Primary | ICD-10-CM

## 2018-10-05 PROCEDURE — 99024 POSTOP FOLLOW-UP VISIT: CPT | Performed by: ORTHOPAEDIC SURGERY

## 2018-10-05 NOTE — PROGRESS NOTES
"Brenda David is a 58 y.o. female returns for     Chief Complaint: Postoperative follow up- Right knee    HISTORY OF PRESENT ILLNESS: Post op right knee. Patient had a knee scope on 08/22/2018. Patient states that she has some discomfort but no pain. It feels tight at times. Patient has finished PT.     CONCURRENT MEDICAL HISTORY:    The following portions of the patient's history were reviewed and updated as appropriate: allergies, current medications, past family history, past medical history, past social history, past surgical history and problem list.     ROS  No fevers or chills.  No chest pain or shortness of air.  No GI or  disturbances.    PHYSICAL EXAMINATION:       Ht 160 cm (63\")   Wt 81.2 kg (179 lb)   LMP 07/02/2002 (Within Months)   BMI 31.71 kg/m²     Physical Exam   Constitutional: She is oriented to person, place, and time. Vital signs are normal. She appears well-developed and well-nourished. She is active and cooperative. She does not appear ill. No distress.   HENT:   Head: Normocephalic.   Pulmonary/Chest: Effort normal. No respiratory distress.   Abdominal: Soft. She exhibits no distension.   Musculoskeletal: She exhibits edema (Mild, right knee). She exhibits no tenderness or deformity.        Right knee: She exhibits no effusion.   Neurological: She is alert and oriented to person, place, and time. GCS eye subscore is 4. GCS verbal subscore is 5. GCS motor subscore is 6.   Skin: Skin is warm, dry and intact. Capillary refill takes less than 2 seconds. No erythema.   Psychiatric: She has a normal mood and affect. Her speech is normal and behavior is normal. Judgment and thought content normal. Cognition and memory are normal.   Vitals reviewed.      GAIT:     [x]  Normal  []  Antalgic    Assistive device: [x]  None  []  Walker     []  Crutches  []  Cane     []  Wheelchair  []  Stretcher    Right Knee Exam     Tenderness   The patient is experiencing no tenderness.         Range of " Motion   Extension: 0   Flexion: 130     Muscle Strength     The patient has normal right knee strength.    Other   Erythema: absent  Sensation: normal  Pulse: present  Swelling: mild  Other tests: no effusion present              ASSESSMENT:    Diagnoses and all orders for this visit:    Internal derangement of right knee    Chondromalacia, right knee    Status post arthroscopic surgery of right knee    Tear of medial meniscus of right knee, current, unspecified tear type, subsequent encounter    Baker's cyst of knee, right    Other orders  -     Emollient (COLLAGEN EX); Apply  topically.    PLAN    She will continue with regression of activity as tolerated.  Continue slowly increase her strengthening conditioning exercises.  No formal restrictions.  Activity as tolerated and follow-up as needed.    Patient's Body mass index is 31.71 kg/m². BMI is above normal parameters. Recommendations include: exercise counseling and nutrition counseling.    Return if symptoms worsen or fail to improve, for recheck.        This document has been electronically signed by Carlos Manuel Sanchez MD on October 6, 2018 7:03 AM      Carlos Manuel Sanchez MD

## 2018-11-13 ENCOUNTER — OFFICE VISIT (OUTPATIENT)
Dept: ORTHOPEDIC SURGERY | Facility: CLINIC | Age: 58
End: 2018-11-13

## 2018-11-13 VITALS — BODY MASS INDEX: 31.71 KG/M2 | HEIGHT: 63 IN

## 2018-11-13 DIAGNOSIS — M25.461 EFFUSION, RIGHT KNEE: ICD-10-CM

## 2018-11-13 DIAGNOSIS — M23.91 INTERNAL DERANGEMENT OF RIGHT KNEE: Primary | ICD-10-CM

## 2018-11-13 DIAGNOSIS — M71.21 BAKER'S CYST OF KNEE, RIGHT: ICD-10-CM

## 2018-11-13 DIAGNOSIS — Z98.890 STATUS POST ARTHROSCOPIC SURGERY OF RIGHT KNEE: ICD-10-CM

## 2018-11-13 DIAGNOSIS — M94.261 CHONDROMALACIA, RIGHT KNEE: ICD-10-CM

## 2018-11-13 PROCEDURE — 99024 POSTOP FOLLOW-UP VISIT: CPT | Performed by: ORTHOPAEDIC SURGERY

## 2018-11-13 PROCEDURE — 20610 DRAIN/INJ JOINT/BURSA W/O US: CPT | Performed by: ORTHOPAEDIC SURGERY

## 2018-11-13 RX ADMIN — LIDOCAINE HYDROCHLORIDE 2 ML: 20 INJECTION, SOLUTION INFILTRATION; PERINEURAL at 11:10

## 2018-11-13 RX ADMIN — TRIAMCINOLONE ACETONIDE 40 MG: 40 INJECTION, SUSPENSION INTRA-ARTICULAR; INTRAMUSCULAR at 11:10

## 2018-11-13 NOTE — PROGRESS NOTES
Brenda David is a 58 y.o. female is s/p       Chief Complaint   Patient presents with   • Right Knee - Post-op       HISTORY OF PRESENT ILLNESS: post op f/u on right knee, patient states that she has a stabbing pain in her knee a few weeks ago and states that her knee does not feel any better , she is still taken the mobic        Allergies   Allergen Reactions   • Bactrim [Sulfamethoxazole-Trimethoprim] Hives   • Adhesive Tape Other (See Comments)     BLISTERS   • Amoxicillin-Pot Clavulanate Diarrhea   • Latex Rash   • Pregabalin Hives   • Sulfa Antibiotics Hives         Current Outpatient Medications:   •  acetaminophen (TYLENOL) 500 MG tablet, Take 500 mg by mouth Every 6 (Six) Hours As Needed for Mild Pain ., Disp: , Rfl:   •  Cholecalciferol (VITAMIN D PO), Take 2,000 Units by mouth Daily., Disp: , Rfl:   •  DULoxetine (CYMBALTA) 60 MG capsule, TAKE 1 CAPSULE BY MOUTH DAILY, Disp: 90 capsule, Rfl: 0  •  Emollient (COLLAGEN EX), Apply  topically., Disp: , Rfl:   •  Glucosamine-Chondroit-Vit C-Mn (GLUCOSAMINE CHONDR 500 COMPLEX PO), Take 1 tablet by mouth Daily., Disp: , Rfl:   •  MAGNESIUM PO, Take 500 mg by mouth Daily., Disp: , Rfl:   •  meloxicam (MOBIC) 15 MG tablet, Take 15 mg by mouth Daily. On hold for surgery, Disp: , Rfl:   •  Multiple Vitamin (MULTI-VITAMIN DAILY PO), Take 1 tablet by mouth Daily., Disp: , Rfl:   •  vitamin C (ASCORBIC ACID) 500 MG tablet, Take 500 mg by mouth Daily. Time released, Disp: , Rfl:     No fevers or chills.  No nausea or vomiting.      PHYSICAL EXAMINATION:       Brenda David is a 58 y.o. female    Patient is awake and alert, answers questions appropriately and is in no apparent distress.    GAIT:     []  Normal  [x]  Antalgic    Assistive device: []  None  []  Walker     []  Crutches  [x]  Cane     []  Wheelchair  []  Stretcher    Ortho Exam   Mild effusion  Tender medially  Incisions clean and dry  No erythema  Good distal pulse and  sensation            ASSESSMENT:    Diagnoses and all orders for this visit:    Internal derangement of right knee  -     Large Joint Arthrocentesis: R knee    Chondromalacia, right knee  -     Large Joint Arthrocentesis: R knee    Status post arthroscopic surgery of right knee  -     Large Joint Arthrocentesis: R knee    Martinez's cyst of knee, right  -     Large Joint Arthrocentesis: R knee    Effusion, right knee  -     Large Joint Arthrocentesis: R knee      Large Joint Arthrocentesis: R knee  Date/Time: 11/13/2018 11:10 AM  Consent given by: patient  Site marked: site marked  Timeout: Immediately prior to procedure a time out was called to verify the correct patient, procedure, equipment, support staff and site/side marked as required   Supporting Documentation  Indications: pain   Procedure Details  Location: knee - R knee  Preparation: Patient was prepped and draped in the usual sterile fashion  Needle size: 22 G  Approach: anteromedial  Medications administered: 40 mg triamcinolone acetonide 40 MG/ML; 2 mL lidocaine 2%  Aspirate amount: 20 mL  Aspirate: yellow  Patient tolerance: patient tolerated the procedure well with no immediate complications            PLAN    Discussed aspiration and injection today  Continue with motion and strength exercises  Slowly mobilize as tolerated  Strength and conditioning exercises.    Patient's Body mass index is 31.71 kg/m². BMI is above normal parameters. Recommendations include: exercise counseling and nutrition counseling.      Return in about 6 weeks (around 12/25/2018) for recheck.    Carlos Manuel Sanchez MD

## 2018-11-18 RX ORDER — TRIAMCINOLONE ACETONIDE 40 MG/ML
40 INJECTION, SUSPENSION INTRA-ARTICULAR; INTRAMUSCULAR
Status: COMPLETED | OUTPATIENT
Start: 2018-11-13 | End: 2018-11-13

## 2018-11-18 RX ORDER — LIDOCAINE HYDROCHLORIDE 20 MG/ML
2 INJECTION, SOLUTION INFILTRATION; PERINEURAL
Status: COMPLETED | OUTPATIENT
Start: 2018-11-13 | End: 2018-11-13

## 2018-11-27 RX ORDER — DULOXETIN HYDROCHLORIDE 60 MG/1
CAPSULE, DELAYED RELEASE ORAL
Qty: 90 CAPSULE | Refills: 0 | Status: SHIPPED | OUTPATIENT
Start: 2018-11-27 | End: 2019-02-21 | Stop reason: SDUPTHER

## 2019-02-21 RX ORDER — DULOXETIN HYDROCHLORIDE 60 MG/1
CAPSULE, DELAYED RELEASE ORAL
Qty: 90 CAPSULE | Refills: 0 | Status: SHIPPED | OUTPATIENT
Start: 2019-02-21 | End: 2019-05-21 | Stop reason: SDUPTHER

## 2019-02-22 ENCOUNTER — OFFICE VISIT (OUTPATIENT)
Dept: ORTHOPEDIC SURGERY | Facility: CLINIC | Age: 59
End: 2019-02-22

## 2019-02-22 VITALS — HEIGHT: 63 IN | BODY MASS INDEX: 31.71 KG/M2

## 2019-02-22 DIAGNOSIS — M23.91 INTERNAL DERANGEMENT OF RIGHT KNEE: Primary | ICD-10-CM

## 2019-02-22 DIAGNOSIS — M23.92 INTERNAL DERANGEMENT OF LEFT KNEE: ICD-10-CM

## 2019-02-22 DIAGNOSIS — S83.241D TEAR OF MEDIAL MENISCUS OF RIGHT KNEE, CURRENT, UNSPECIFIED TEAR TYPE, SUBSEQUENT ENCOUNTER: ICD-10-CM

## 2019-02-22 DIAGNOSIS — M25.561 PAIN IN BOTH KNEES, UNSPECIFIED CHRONICITY: ICD-10-CM

## 2019-02-22 DIAGNOSIS — M94.261 CHONDROMALACIA, RIGHT KNEE: ICD-10-CM

## 2019-02-22 DIAGNOSIS — M25.461 EFFUSION, RIGHT KNEE: ICD-10-CM

## 2019-02-22 DIAGNOSIS — M25.562 PAIN IN BOTH KNEES, UNSPECIFIED CHRONICITY: ICD-10-CM

## 2019-02-22 DIAGNOSIS — Z98.890 STATUS POST ARTHROSCOPIC SURGERY OF RIGHT KNEE: ICD-10-CM

## 2019-02-22 DIAGNOSIS — M71.21 BAKER'S CYST OF KNEE, RIGHT: ICD-10-CM

## 2019-02-22 DIAGNOSIS — R20.0 NUMBNESS IN LEFT LEG: ICD-10-CM

## 2019-02-22 PROCEDURE — 99213 OFFICE O/P EST LOW 20 MIN: CPT | Performed by: ORTHOPAEDIC SURGERY

## 2019-02-22 RX ORDER — MELOXICAM 15 MG/1
15 TABLET ORAL DAILY
Qty: 90 TABLET | Refills: 1 | Status: SHIPPED | OUTPATIENT
Start: 2019-02-22 | End: 2019-05-22

## 2019-02-22 RX ORDER — GLUCOSAMINE HCL
POWDER (GRAM) MISCELLANEOUS
COMMUNITY
End: 2019-05-09

## 2019-02-22 RX ORDER — IBUPROFEN 200 MG
200 TABLET ORAL EVERY 6 HOURS PRN
COMMUNITY
End: 2019-03-08

## 2019-02-22 RX ORDER — MELOXICAM 15 MG/1
15 TABLET ORAL DAILY
Qty: 30 TABLET | Refills: 1 | Status: SHIPPED | OUTPATIENT
Start: 2019-02-22 | End: 2019-02-22 | Stop reason: SDUPTHER

## 2019-02-22 NOTE — PROGRESS NOTES
"Brenda David is a 58 y.o. female returns for     Chief Complaint   Patient presents with   • Right Knee - Follow-up   • Left Knee - Follow-up       HISTORY OF PRESENT ILLNESS: Patient being seen for bilateral knee follow up. Right knee injection given 11/13/18. Patient states injection helped, however it only helped for about 10-12 days.   Pain with pivot and twisting  Pain with activity  Occasional sharp stabbing pain.      also complaining numbness in left knee/down left leg.  Also along the back of her knee.  Numbness started about 4-5 days ago and is now getting progressively worse.    Has neuropathy in both feet but that is chronic.  But, the numbness and tingling in left leg is new - and different.       CONCURRENT MEDICAL HISTORY:    The following portions of the patient's history were reviewed and updated as appropriate: allergies, current medications, past family history, past medical history, past social history, past surgical history and problem list.     ROS  No fevers or chills.  No chest pain or shortness of air.  No GI or  disturbances.    PHYSICAL EXAMINATION:       Ht 160 cm (63\")   LMP 07/02/2002 (Within Months)   BMI 31.71 kg/m²     Physical Exam   Constitutional: She is oriented to person, place, and time. She appears well-developed and well-nourished.   Musculoskeletal:        Right knee: She exhibits no effusion.        Left knee: She exhibits no effusion.   Neurological: She is alert and oriented to person, place, and time.   Psychiatric: She has a normal mood and affect. Her behavior is normal. Judgment and thought content normal.       GAIT:     [x]  Normal  []  Antalgic    Assistive device: [x]  None  []  Walker     []  Crutches  []  Cane     []  Wheelchair  []  Stretcher    Right Knee Exam     Muscle Strength   The patient has normal right knee strength.    Tenderness   The patient is experiencing tenderness in the medial joint line.    Range of Motion   The patient has normal " right knee ROM.  Extension: 0   Flexion: 110     Tests   Lana:  Medial - positive Lateral - negative  Lachman:  Anterior - negative    Posterior - negative  Drawer:  Anterior - negative    Posterior - negative    Other   Erythema: absent  Sensation: normal  Pulse: present  Swelling: moderate  Effusion: no effusion present      Left Knee Exam     Muscle Strength   The patient has normal left knee strength.    Tenderness   The patient is experiencing tenderness in the lateral retinaculum and lateral joint line.    Range of Motion   The patient has normal left knee ROM.  Extension: 0   Flexion: 110     Tests   Lana:  Medial - negative Lateral - positive  Lachman:  Anterior - negative    Posterior - negative  Drawer:  Anterior - negative     Posterior - negative    Other   Erythema: absent  Sensation: normal  Pulse: present  Swelling: none  Effusion: no effusion present      Back Exam     Comments:  Mild stiffness but no effect on numbness with hyperextension.  Has full flexion of lumbar spine.  Good mobility.                MRI right knee.     HISTORY: Right knee pain     Prior exam: Knee x-ray August 2, 2018.         TECHNIQUE: Multiplanar multisequence noncontrast images right  knee.     findings:      Complex tear having both horizontal and vertical/oblique  components posterior horn medial meniscus. Normal anterior horn.  Normal lateral meniscus.      Tiny cystic lesion medial collateral ligament posterior aspect  (series 5 image 12). This may be a sequela of prior injury, small  post traumatic ganglion.         Normal intact anterior and posterior cruciate ligaments. Areas of  grade 2 and grade III chondromalacia articular cartilage distal  femur medial aspect. Small popliteal, Baker's cyst posterior  medial aspect of the right knee series 3 images 16-18.     IMPRESSION:  CONCLUSION: Complex tear posterior horn medial meniscus. Areas of  grade 2 and grade III chondromalacia distal femur medial  aspect.  Small popliteal, Baker's cyst. Tiny oval cystic lesion within the  posterior fibers of the medial collateral ligament probably  sequela of prior injury i.e. post traumatic ganglion.     MRI right knee is otherwise remarkable.     Electronically signed by:  Mario Forbes MD  8/6/2018 5:17 PM CDT  Workstation: [id number]      Ordering Provider:  Carlos Manuel Sanchez MD  Ordering Diagnosis/Indication:  Pain in both knees, unspecified chronicity     Procedure:  XR KNEE BILATERAL AP STANDING  Exam Date:  8/2/18     COMPARISON:  Not applicable, no relevant images available.     IMPRESSION:  AP bilateral standing of the knees with lateral of each knee shows acceptable position and alignment of both knees with no evidence of acute bony abnormality.  No fracture or dislocation is noted.  No significant arthritic changes are noted.  No arthritic changes noted in the patellofemoral compartment either.        Carlos Manuel Sanchez MD  8/2/18      Ordering Provider:  Carlos Manuel Sanchez MD  Ordering Diagnosis/Indication:  Pain in both knees, unspecified chronicity     Procedure:  XR KNEE 1 OR 2 VW BILATERAL  Exam Date:  8/2/18     COMPARISON:  Not applicable, no relevant images available.     IMPRESSION: AP bilateral standing of the knees with lateral of each knee shows acceptable position and alignment of both knees with no evidence of acute bony abnormality.  No fracture or dislocation is noted.  No significant arthritic changes are noted.  No arthritic changes noted in the patellofemoral compartment either.        Carlos Manuel Sanchez MD  8/2/18      ASSESSMENT:    Diagnoses and all orders for this visit:    Internal derangement of right knee  -     MRI Knee Left Without Contrast; Future  -     MRI Knee Right Without Contrast; Future    Chondromalacia, right knee  -     MRI Knee Left Without Contrast; Future  -     MRI Knee Right Without Contrast; Future    Status post arthroscopic surgery of right knee  -      MRI Knee Left Without Contrast; Future  -     MRI Knee Right Without Contrast; Future    Baker's cyst of knee, right  -     MRI Knee Left Without Contrast; Future  -     MRI Knee Right Without Contrast; Future    Effusion, right knee  -     MRI Knee Left Without Contrast; Future  -     MRI Knee Right Without Contrast; Future    Tear of medial meniscus of right knee, current, unspecified tear type, subsequent encounter  -     MRI Knee Left Without Contrast; Future  -     MRI Knee Right Without Contrast; Future    Pain in both knees, unspecified chronicity  -     MRI Knee Left Without Contrast; Future  -     MRI Knee Right Without Contrast; Future    Numbness in left leg  -     MRI Knee Left Without Contrast; Future  -     MRI Knee Right Without Contrast; Future    Internal derangement of left knee  -     MRI Knee Left Without Contrast; Future  -     MRI Knee Right Without Contrast; Future    Other orders  -     ibuprofen (ADVIL,MOTRIN) 200 MG tablet; Take 200 mg by mouth Every 6 (Six) Hours As Needed for Mild Pain .  -     Glucosamine HCl powder;   -     Selenium (SELENIMIN PO); Take  by mouth.  -     Discontinue: meloxicam (MOBIC) 15 MG tablet; Take 1 tablet by mouth Daily.          PLAN    Having similar type issues in right knee as she did prior to arthroscopy in August of 2018.  Had an aspiration and injection about 3 months postop but is having mechanical symptoms again.  Exam consistent with meniscal issues in right knee.  Need a current MRI to assess for changes - internal derangement.    Also having issues in left knee.  Having numbness in left leg at knee and distally.  Symptoms are not recreated on back exam, SLR is negative.  Also having meniscal exam findings on left.  Could have Baker's cyst or other source of nerve impingement leading to the numbness. Recommend MRI of left knee to assess for internal derangement.    We also discussed the possibility of further back exam if her numbness and tingling  continues to progress/worsen and especially if she develops weakness as well.    Patient's Body mass index is 31.71 kg/m². BMI is above normal parameters. Recommendations include: exercise counseling and nutrition counseling.      Return for recheck for MRI results.    Carlos Manuel Sanchez MD

## 2019-03-04 ENCOUNTER — HOSPITAL ENCOUNTER (OUTPATIENT)
Dept: MRI IMAGING | Facility: HOSPITAL | Age: 59
Discharge: HOME | End: 2019-03-04

## 2019-03-04 ENCOUNTER — HOSPITAL ENCOUNTER (OUTPATIENT)
Dept: MRI IMAGING | Facility: HOSPITAL | Age: 59
Discharge: HOME | End: 2019-03-04
Admitting: ORTHOPAEDIC SURGERY

## 2019-03-04 DIAGNOSIS — M94.261 CHONDROMALACIA, RIGHT KNEE: ICD-10-CM

## 2019-03-04 DIAGNOSIS — M23.92 INTERNAL DERANGEMENT OF LEFT KNEE: ICD-10-CM

## 2019-03-04 DIAGNOSIS — M71.21 BAKER'S CYST OF KNEE, RIGHT: ICD-10-CM

## 2019-03-04 DIAGNOSIS — M23.91 INTERNAL DERANGEMENT OF RIGHT KNEE: ICD-10-CM

## 2019-03-04 DIAGNOSIS — M25.461 EFFUSION, RIGHT KNEE: ICD-10-CM

## 2019-03-04 DIAGNOSIS — R20.0 NUMBNESS IN LEFT LEG: ICD-10-CM

## 2019-03-04 DIAGNOSIS — S83.241D TEAR OF MEDIAL MENISCUS OF RIGHT KNEE, CURRENT, UNSPECIFIED TEAR TYPE, SUBSEQUENT ENCOUNTER: ICD-10-CM

## 2019-03-04 DIAGNOSIS — Z98.890 STATUS POST ARTHROSCOPIC SURGERY OF RIGHT KNEE: ICD-10-CM

## 2019-03-04 DIAGNOSIS — M25.561 PAIN IN BOTH KNEES, UNSPECIFIED CHRONICITY: ICD-10-CM

## 2019-03-04 DIAGNOSIS — M25.562 PAIN IN BOTH KNEES, UNSPECIFIED CHRONICITY: ICD-10-CM

## 2019-03-04 PROCEDURE — 73721 MRI JNT OF LWR EXTRE W/O DYE: CPT

## 2019-03-14 ENCOUNTER — OFFICE VISIT (OUTPATIENT)
Dept: ORTHOPEDIC SURGERY | Facility: CLINIC | Age: 59
End: 2019-03-14

## 2019-03-14 VITALS — HEIGHT: 63 IN | BODY MASS INDEX: 33.49 KG/M2 | WEIGHT: 189 LBS

## 2019-03-14 DIAGNOSIS — M94.261 CHONDROMALACIA, RIGHT KNEE: ICD-10-CM

## 2019-03-14 DIAGNOSIS — M23.92 INTERNAL DERANGEMENT OF LEFT KNEE: ICD-10-CM

## 2019-03-14 DIAGNOSIS — M23.91 INTERNAL DERANGEMENT OF RIGHT KNEE: ICD-10-CM

## 2019-03-14 DIAGNOSIS — S83.241D TEAR OF MEDIAL MENISCUS OF RIGHT KNEE, CURRENT, UNSPECIFIED TEAR TYPE, SUBSEQUENT ENCOUNTER: Primary | ICD-10-CM

## 2019-03-14 PROCEDURE — 99213 OFFICE O/P EST LOW 20 MIN: CPT | Performed by: ORTHOPAEDIC SURGERY

## 2019-03-14 PROCEDURE — 20610 DRAIN/INJ JOINT/BURSA W/O US: CPT | Performed by: ORTHOPAEDIC SURGERY

## 2019-03-14 RX ORDER — TRIAMCINOLONE ACETONIDE 40 MG/ML
40 INJECTION, SUSPENSION INTRA-ARTICULAR; INTRAMUSCULAR
Status: COMPLETED | OUTPATIENT
Start: 2019-03-14 | End: 2019-03-14

## 2019-03-14 RX ORDER — LIDOCAINE HYDROCHLORIDE 10 MG/ML
2 INJECTION, SOLUTION EPIDURAL; INFILTRATION; INTRACAUDAL; PERINEURAL
Status: COMPLETED | OUTPATIENT
Start: 2019-03-14 | End: 2019-03-14

## 2019-03-14 RX ADMIN — LIDOCAINE HYDROCHLORIDE 2 ML: 10 INJECTION, SOLUTION EPIDURAL; INFILTRATION; INTRACAUDAL; PERINEURAL at 11:21

## 2019-03-14 RX ADMIN — TRIAMCINOLONE ACETONIDE 40 MG: 40 INJECTION, SUSPENSION INTRA-ARTICULAR; INTRAMUSCULAR at 11:21

## 2019-03-14 NOTE — PROGRESS NOTES
"Brenda David is a 58 y.o. female returns for     Chief Complaint   Patient presents with   • Right Ankle - Follow-up, Pain   • Left Knee - Follow-up, Pain   • Results       HISTORY OF PRESENT ILLNESS:f/u bilateral knee pain, mri done on 3/4/2019  Pain is better with meloxicam on the left   Still having pain on right  Pain with pivot and twist.  Prior aspiration and injection into right knee.  Sharp stabbing pains.         CONCURRENT MEDICAL HISTORY:    The following portions of the patient's history were reviewed and updated as appropriate: allergies, current medications, past family history, past medical history, past social history, past surgical history and problem list.     ROS  No fevers or chills.  No chest pain or shortness of air.  No GI or  disturbances.    PHYSICAL EXAMINATION:       Ht 160 cm (63\")   Wt 85.7 kg (189 lb)   LMP 07/02/2002 (Within Months)   BMI 33.48 kg/m²     Physical Exam   Constitutional: She is oriented to person, place, and time. She appears well-developed and well-nourished.   Musculoskeletal:        Right knee: She exhibits no effusion.        Left knee: She exhibits no effusion.   Neurological: She is alert and oriented to person, place, and time.   Psychiatric: She has a normal mood and affect. Her behavior is normal. Judgment and thought content normal.       GAIT:     []  Normal  [x]  Antalgic    Assistive device: [x]  None  []  Walker     []  Crutches  []  Cane     []  Wheelchair  []  Stretcher    Right Knee Exam     Muscle Strength   The patient has normal right knee strength.    Tenderness   The patient is experiencing tenderness in the medial joint line.    Range of Motion   The patient has normal right knee ROM.  Extension: 0   Flexion: 110     Tests   Lana:  Medial - positive Lateral - negative  Lachman:  Anterior - negative    Posterior - negative  Drawer:  Anterior - negative    Posterior - negative    Other   Erythema: absent  Sensation: normal  Pulse: " present  Swelling: moderate  Effusion: no effusion present      Left Knee Exam     Muscle Strength   The patient has normal left knee strength.    Tenderness   The patient is experiencing tenderness in the lateral retinaculum and lateral joint line.    Range of Motion   The patient has normal left knee ROM.  Extension: 0   Flexion: 110     Tests   Lana:  Medial - negative Lateral - positive  Lachman:  Anterior - negative    Posterior - negative  Drawer:  Anterior - negative     Posterior - negative    Other   Erythema: absent  Sensation: normal  Pulse: present  Swelling: none  Effusion: no effusion present      Back Exam     Comments:  Mild stiffness but no effect on numbness with hyperextension.  Has full flexion of lumbar spine.  Good mobility.              Xr Ribs Right With Pa Chest    Result Date: 3/8/2019  Narrative: Procedure: Right    ribs, chest Indication: Trauma patient fell. Injury right anterior ribs. Technique: Five    views Prior Relevant Exam: None    FINDINGS: No acute bony abnormality seen. Costovertebral junctions intact. No atelectasis, no effusion noted. No pneumothorax seen. Heart normal size. Lung fields are clear. Visualized joint spaces appear intact.     Impression: CONCLUSION: Negative right    ribs. Normal chest.  Electronically signed by:  Mario Forbes MD  3/8/2019 10:20 AM CST Workstation: [id number]    Mri Knee Right Without Contrast    Result Date: 3/5/2019  Narrative: MRI right knee without contrast. HISTORY: Medial right knee pain. Prior arthroscopic surgery. Prior exam: MRI right knee August 6, 2018. If technique: Multiplanar multisequence noncontrast images right knee. Large intra-articular and supra patellar effusion. Prominence of the plica suprapatellar recess. Moderate size popliteal, Baker's cyst 2.39 x 0.89 x 5.67 cm in diameter. Complex tear posterior horn medial meniscus. Normal anterior horn. Normal lateral meniscus. Normal anterior and posterior cruciate ligaments.  Areas of grade 2 and grade 3, chondromalacia distal femur medial aspect. Focal areas of grade 2 and grade 3 chondral malacia patellar articular hyaline cartilage just lateral to midline series 2 image 16.     Impression: CONCLUSION: Large intra-articular and supra patellar effusion. Effusion and Baker's cyst are larger than on prior exam. Grade 2 and grade 3, chondromalacia distal femur medial aspect more pronounced than on prior exam. Focal area of grade 2 and grade III chondromalacia patellar articular hyaline cartilage is a new findings prior exam. Complex tear posterior horn medial meniscus, similar to prior exam. Electronically signed by:  Mario Forbes MD  3/5/2019 3:06 PM CST Workstation: [id number]    Mri Knee Left Without Contrast    Result Date: 3/4/2019  Narrative: MRI left knee. HISTORY: Left knee pain. TECHNIQUE: Multiplanar multisequence noncontrast images left knee. Prior exam: August 2, 2018. Intact anterior and posterior cruciate ligaments. Normal patellar and quadriceps tendon. Subtle horizontal intrasubstance tear posterior horn medial meniscus. (Series 4 image 14.) Normal lateral meniscus. Subtle grade 1 grade 2, chondromalacia patellar articular hyaline cartilage lateral articular facet. Small intra-articular effusion. Moderate size popliteal, Baker's cyst 5.12 x 1.02 x 2.58 cm in diameter.     Impression: CONCLUSION: Subtle horizontal intrasubstance tear superior medial meniscus. Small intra-articular effusion and moderate size popliteal, Baker's cyst. Subtle grade 1 grade II chondromalacia patellar articular hyaline cartilage, lateral articular facet. MRI left knee is otherwise unremarkable. Electronically signed by:  Mario Forbes MD  3/4/2019 3:54 PM CST Workstation: [email or web address]            ASSESSMENT:    Diagnoses and all orders for this visit:    Tear of medial meniscus of right knee, current, unspecified tear type, subsequent encounter    Internal derangement of right knee    Chondromalacia, right  knee    Internal derangement of left knee    Other orders  -     Large Joint Arthrocentesis        .[email or web address]  PLAN    Inject right knee today  Discussed possible arthroscopy right knee again;  Activity modification  Progress slowly  Swelling control.    Patient's Body mass index is 33.48 kg/m². BMI is above normal parameters. Recommendations include: exercise counseling and nutrition counseling.      Large Joint Arthrocentesis: R knee  Date/Time: 3/14/2019 11:21 AM  Consent given by: patient  Site marked: site marked  Timeout: Immediately prior to procedure a time out was called to verify the correct patient, procedure, equipment, support staff and site/side marked as required   Supporting Documentation  Indications: pain   Procedure Details  Location: knee - R knee  Preparation: Patient was prepped and draped in the usual sterile fashion  Needle size: 22 G  Approach: anteromedial  Medications administered: 40 mg triamcinolone acetonide 40 MG/ML; 2 mL lidocaine PF 1% 1 %  Patient tolerance: patient tolerated the procedure well with no immediate complications          Return in about 6 weeks (around 4/25/2019) for recheck.    Carlos Manuel Sanchez MD

## 2019-05-09 ENCOUNTER — OFFICE VISIT (OUTPATIENT)
Dept: ORTHOPEDIC SURGERY | Facility: CLINIC | Age: 59
End: 2019-05-09

## 2019-05-09 VITALS — BODY MASS INDEX: 33.49 KG/M2 | HEIGHT: 63 IN | WEIGHT: 189 LBS

## 2019-05-09 DIAGNOSIS — S83.241D TEAR OF MEDIAL MENISCUS OF RIGHT KNEE, CURRENT, UNSPECIFIED TEAR TYPE, SUBSEQUENT ENCOUNTER: Primary | ICD-10-CM

## 2019-05-09 DIAGNOSIS — M25.461 EFFUSION, RIGHT KNEE: ICD-10-CM

## 2019-05-09 DIAGNOSIS — M94.261 CHONDROMALACIA, RIGHT KNEE: ICD-10-CM

## 2019-05-09 DIAGNOSIS — M23.92 INTERNAL DERANGEMENT OF LEFT KNEE: ICD-10-CM

## 2019-05-09 DIAGNOSIS — M23.91 INTERNAL DERANGEMENT OF RIGHT KNEE: ICD-10-CM

## 2019-05-09 DIAGNOSIS — Z98.890 STATUS POST ARTHROSCOPIC SURGERY OF RIGHT KNEE: ICD-10-CM

## 2019-05-09 DIAGNOSIS — M71.21 BAKER'S CYST OF KNEE, RIGHT: ICD-10-CM

## 2019-05-09 PROCEDURE — 99214 OFFICE O/P EST MOD 30 MIN: CPT | Performed by: ORTHOPAEDIC SURGERY

## 2019-05-09 RX ORDER — BUPIVACAINE HCL/0.9 % NACL/PF 0.1 %
2 PLASTIC BAG, INJECTION (ML) EPIDURAL ONCE
Status: CANCELLED | OUTPATIENT
Start: 2019-05-29 | End: 2019-05-09

## 2019-05-09 NOTE — PROGRESS NOTES
Brenda David is a 58 y.o. female returns for     Chief Complaint   Patient presents with   • Right Knee - Follow-up   • Left Knee - Follow-up       HISTORY OF PRESENT ILLNESS:  Follow up bilateral knee pain.  Patient received a steroid injection into the right knee on 3/14/19.  She reports much improvement for 2 weeks.  She has pain with activities of daily living.  Mostly she has a dull ache but she has occasional sharp stabbing pains.  She has pain with pivoting and twisting.  She is unable to squat down.  She states that the injection helped but did not last very long.  Pain is more consistent in her right knee and she occasionally has worse pain in her left knee.  No numbness or tingling.  Pain is been worsening over the last 3 months.       CONCURRENT MEDICAL HISTORY:    Past Medical History:   Diagnosis Date   • Anxiety    • Arthritis    • History of     • Ingrown toenail    • Knee pain, bilateral    • Migraine    • Plantar fasciitis    • Wears glasses        Allergies   Allergen Reactions   • Bactrim [Sulfamethoxazole-Trimethoprim] Hives   • Adhesive Tape Other (See Comments)     BLISTERS   • Amoxicillin-Pot Clavulanate Diarrhea   • Latex Rash   • Pregabalin Hives   • Sulfa Antibiotics Hives       Current Outpatient Medications on File Prior to Visit   Medication Sig   • Cholecalciferol (VITAMIN D PO) Take 2,000 Units by mouth Daily.   • DULoxetine (CYMBALTA) 60 MG capsule TAKE 1 CAPSULE BY MOUTH DAILY   • Emollient (COLLAGEN EX) Apply  topically.   • MAGNESIUM PO Take 500 mg by mouth Daily.   • meloxicam (MOBIC) 15 MG tablet TAKE 1 TABLET BY MOUTH DAILY   • Multiple Vitamin (MULTI-VITAMIN DAILY PO) Take 1 tablet by mouth Daily.   • Selenium (SELENIMIN PO) Take  by mouth.   • vitamin C (ASCORBIC ACID) 500 MG tablet Take 500 mg by mouth Daily. Time released   • [DISCONTINUED] cyclobenzaprine (FLEXERIL) 5 MG tablet Take one pill qhs prn muscle relaxation (Patient taking differently: Take one  "pill qhs prn muscle relaxation)   • [DISCONTINUED] Emollient (COLLAGEN EX) Apply  topically.   • [DISCONTINUED] Glucosamine HCl powder      No current facility-administered medications on file prior to visit.        Past Surgical History:   Procedure Laterality Date   • BACK SURGERY     • BREAST BIOPSY     •  SECTION     • HYSTERECTOMY     • KNEE ARTHROSCOPY Right 2018    Procedure: KNEE ARTHROSCOPY with debridement medial meniscus     (LATEX ALLERGY) ;  Surgeon: Carlos Manuel Sanchez MD;  Location: [facility];  Service: Orthopedics   • MTP JOINT FUSION Right 2018    Procedure: FIRST METATARSOPHALANGEAL JOINT ARTHRODESIS       (C-ARM)                  LATEX ALLERGY;  Surgeon: Kwadwo Jensen DPM;  Location: [facility];  Service:    • SINUS SURGERY         Family History   Problem Relation Age of Onset   • Heart disease Mother    • Hypertension Mother    • Thyroid disease Mother    • Heart disease Father    • Hypertension Father    • Thyroid disease Father    • Thyroid disease Sister    • Breast cancer Paternal Grandmother        Social History     Socioeconomic History   • Marital status:      Spouse name: Not on file   • Number of children: Not on file   • Years of education: Not on file   • Highest education level: Not on file   Tobacco Use   • Smoking status: Former Smoker   • Smokeless tobacco: Never Used   Substance and Sexual Activity   • Alcohol use: Yes     Comment: socially   • Drug use: No   • Sexual activity: Defer           ROS  No fevers or chills.  No chest pain or shortness of air.  No GI or  disturbances.  Other than bilateral knee pain, all other systems reviewed as negative.    PHYSICAL EXAMINATION:       Ht 160 cm (63\")   Wt 85.7 kg (189 lb)   LMP 2002 (Within Months)   BMI 33.48 kg/m²     Physical Exam   Constitutional: She is oriented to person, place, and time. She appears well-developed and well-nourished. No distress.   Cardiovascular: " Normal rate, regular rhythm and normal heart sounds.   Pulmonary/Chest: Effort normal and breath sounds normal.   Abdominal: Soft. Bowel sounds are normal.   Musculoskeletal:        Right knee: She exhibits no effusion.        Left knee: She exhibits no effusion.   Neurological: She is alert and oriented to person, place, and time.   Psychiatric: She has a normal mood and affect. Her behavior is normal. Judgment and thought content normal.   Vitals reviewed.      GAIT:     []  Normal  [x]  Antalgic    Assistive device: [x]  None  []  Walker     []  Crutches  []  Cane     []  Wheelchair  []  Stretcher    Right Knee Exam     Muscle Strength   The patient has normal right knee strength.    Tenderness   The patient is experiencing tenderness in the medial joint line and medial retinaculum.    Range of Motion   The patient has normal right knee ROM.  Extension: 0   Flexion: 110     Tests   Lana:  Medial - positive Lateral - negative  Lachman:  Anterior - negative    Posterior - negative  Drawer:  Anterior - negative    Posterior - negative    Other   Erythema: absent  Sensation: normal  Pulse: present  Swelling: mild  Effusion: no effusion present      Left Knee Exam     Muscle Strength   The patient has normal left knee strength.    Tenderness   The patient is experiencing tenderness in the lateral retinaculum and lateral joint line.    Range of Motion   The patient has normal left knee ROM.  Extension: 0   Flexion: 110     Tests   Lana:  Medial - negative Lateral - positive  Lachman:  Anterior - negative    Posterior - negative  Drawer:  Anterior - negative     Posterior - negative    Other   Erythema: absent  Sensation: normal  Pulse: present  Swelling: none  Effusion: no effusion present      Back Exam     Comments:  Mild stiffness but no effect on numbness with hyperextension.  Has full flexion of lumbar spine.  Good mobility.                  MRI right knee without contrast.     HISTORY: Medial right knee  pain. Prior arthroscopic surgery.     Prior exam: MRI right knee August 6, 2018.     If technique: Multiplanar multisequence noncontrast images right  knee.     Large intra-articular and supra patellar effusion. Prominence of  the plica suprapatellar recess. Moderate size popliteal, Baker's  cyst 2.39 x 0.89 x 5.67 cm in diameter. Complex tear posterior  horn medial meniscus. Normal anterior horn. Normal lateral  meniscus. Normal anterior and posterior cruciate ligaments.     Areas of grade 2 and grade 3, chondromalacia distal femur medial  aspect. Focal areas of grade 2 and grade 3 chondral malacia  patellar articular hyaline cartilage just lateral to midline  series 2 image 16.     IMPRESSION:  CONCLUSION: Large intra-articular and supra patellar effusion.  Effusion and Baker's cyst are larger than on prior exam. Grade 2  and grade 3, chondromalacia distal femur medial aspect more  pronounced than on prior exam. Focal area of grade 2 and grade  III chondromalacia patellar articular hyaline cartilage is a new  findings prior exam.         Complex tear posterior horn medial meniscus, similar to prior  exam.     Electronically signed by:  Mario Forbes MD  3/5/2019 3:06 PM CST  Workstation: [id number]        Ordering Provider:  Carlos Manuel Sanchez MD  Ordering Diagnosis/Indication:  Pain in both knees, unspecified chronicity     Procedure:  XR KNEE BILATERAL AP STANDING  Exam Date:  8/2/18     COMPARISON:  Not applicable, no relevant images available.     IMPRESSION:  AP bilateral standing of the knees with lateral of each knee shows acceptable position and alignment of both knees with no evidence of acute bony abnormality.  No fracture or dislocation is noted.  No significant arthritic changes are noted.  No arthritic changes noted in the patellofemoral compartment either.        Carlos Manuel Sanchez MD  8/2/18         Ordering Provider:  Carlos Manuel Sanchez MD  Ordering Diagnosis/Indication:  Pain in both  knees, unspecified chronicity     Procedure:  XR KNEE 1 OR 2 VW BILATERAL  Exam Date:  8/2/18     COMPARISON:  Not applicable, no relevant images available.     IMPRESSION: AP bilateral standing of the knees with lateral of each knee shows acceptable position and alignment of both knees with no evidence of acute bony abnormality.  No fracture or dislocation is noted.  No significant arthritic changes are noted.  No arthritic changes noted in the patellofemoral compartment either.        Carlos Manuel Sanchez MD  8/2/18       ASSESSMENT:    Diagnoses and all orders for this visit:    Tear of medial meniscus of right knee, current, unspecified tear type, subsequent encounter  -     Case Request; Standing  -     ceFAZolin (ANCEF) 2 g in sodium chloride 0.9 % 100 mL IVPB    Internal derangement of right knee  -     Case Request; Standing  -     ceFAZolin (ANCEF) 2 g in sodium chloride 0.9 % 100 mL IVPB    Internal derangement of left knee  -     Case Request; Standing  -     ceFAZolin (ANCEF) 2 g in sodium chloride 0.9 % 100 mL IVPB    Chondromalacia, right knee  -     Case Request; Standing  -     ceFAZolin (ANCEF) 2 g in sodium chloride 0.9 % 100 mL IVPB    Baker's cyst of knee, right  -     Case Request; Standing  -     ceFAZolin (ANCEF) 2 g in sodium chloride 0.9 % 100 mL IVPB    Status post arthroscopic surgery of right knee  -     Case Request; Standing  -     ceFAZolin (ANCEF) 2 g in sodium chloride 0.9 % 100 mL IVPB    Effusion, right knee  -     Case Request; Standing  -     ceFAZolin (ANCEF) 2 g in sodium chloride 0.9 % 100 mL IVPB    Other orders  -     Emollient (COLLAGEN EX); Apply  topically.  -     Follow Anesthesia Guidelines / Standing Orders; Future  -     Provide instructions to patient regarding NPO status  -     Follow Anesthesia Guidelines / Standing Orders; Standing  -     Verify NPO Status; Standing  -     POC Glucose Once; Standing  -     Clip operative site; Standing  -     Obtain informed  consent (if not collected inpatient or PAT); Standing  -     NPO After Midnight          PLAN    The patient voiced understanding of the risks, benefits, and alternative forms of treatment that were discussed and the patient consents to proceed with surgery.  All risks, benefits and alternatives were discussed.  Risks including but not exclusive to anesthetic complications, including death, MI, CVA, infection, bleeding DVT, fracture, residual pain and need for future surgery.    This discussion was held with the patient by Carlos Manuel Sanchez MD and all questions were answered.    Plan arthroscopy of the right knee with debridement of the medial meniscus    We discussed the possibility of worsening arthritic change since her prior arthroscopy.  However upon review of the x-ray, MRI, and arthroscopy photos, I feel the best course of action at this time is to repeat arthroscopy to debride the medial meniscus.  Progression of motion activity postoperatively.    Patient's Body mass index is 33.48 kg/m². BMI is above normal parameters. Recommendations include: exercise counseling and nutrition counseling.      Return for Post-operative eval.    Carlos Manuel Sanchez MD

## 2019-05-13 PROBLEM — M25.461 EFFUSION, RIGHT KNEE: Status: ACTIVE | Noted: 2019-05-13

## 2019-05-20 RX ORDER — DULOXETIN HYDROCHLORIDE 60 MG/1
CAPSULE, DELAYED RELEASE ORAL
Qty: 90 CAPSULE | Refills: 0 | OUTPATIENT
Start: 2019-05-20

## 2019-05-21 RX ORDER — DULOXETIN HYDROCHLORIDE 60 MG/1
CAPSULE, DELAYED RELEASE ORAL
Qty: 90 CAPSULE | Refills: 0 | Status: SHIPPED | OUTPATIENT
Start: 2019-05-21 | End: 2019-05-22

## 2019-05-22 ENCOUNTER — APPOINTMENT (OUTPATIENT)
Dept: PREADMISSION TESTING | Facility: HOSPITAL | Age: 59
End: 2019-05-22

## 2019-05-22 VITALS
HEART RATE: 75 BPM | HEIGHT: 63 IN | BODY MASS INDEX: 37.92 KG/M2 | SYSTOLIC BLOOD PRESSURE: 142 MMHG | WEIGHT: 214 LBS | RESPIRATION RATE: 16 BRPM | DIASTOLIC BLOOD PRESSURE: 82 MMHG | OXYGEN SATURATION: 96 %

## 2019-05-22 RX ORDER — DULOXETIN HYDROCHLORIDE 60 MG/1
60 CAPSULE, DELAYED RELEASE ORAL DAILY
COMMUNITY
End: 2019-05-28 | Stop reason: SDUPTHER

## 2019-05-22 RX ORDER — UREA 10 %
LOTION (ML) TOPICAL NIGHTLY PRN
COMMUNITY
End: 2020-02-03

## 2019-05-28 ENCOUNTER — OFFICE VISIT (OUTPATIENT)
Dept: FAMILY MEDICINE CLINIC | Facility: CLINIC | Age: 59
End: 2019-05-28

## 2019-05-28 VITALS
HEART RATE: 76 BPM | SYSTOLIC BLOOD PRESSURE: 132 MMHG | BODY MASS INDEX: 39.08 KG/M2 | WEIGHT: 220.56 LBS | OXYGEN SATURATION: 99 % | HEIGHT: 63 IN | DIASTOLIC BLOOD PRESSURE: 76 MMHG

## 2019-05-28 DIAGNOSIS — E55.9 VITAMIN D DEFICIENCY: ICD-10-CM

## 2019-05-28 DIAGNOSIS — M25.561 RIGHT KNEE PAIN, UNSPECIFIED CHRONICITY: Primary | ICD-10-CM

## 2019-05-28 DIAGNOSIS — G47.00 INSOMNIA, UNSPECIFIED TYPE: ICD-10-CM

## 2019-05-28 PROCEDURE — 99214 OFFICE O/P EST MOD 30 MIN: CPT | Performed by: FAMILY MEDICINE

## 2019-05-28 RX ORDER — PANTOPRAZOLE SODIUM 40 MG/1
40 TABLET, DELAYED RELEASE ORAL DAILY
Qty: 90 TABLET | Refills: 1 | Status: SHIPPED | OUTPATIENT
Start: 2019-05-28 | End: 2019-11-26

## 2019-05-28 RX ORDER — DULOXETIN HYDROCHLORIDE 60 MG/1
60 CAPSULE, DELAYED RELEASE ORAL DAILY
Qty: 90 CAPSULE | Refills: 1 | Status: SHIPPED | OUTPATIENT
Start: 2019-05-28 | End: 2020-02-03

## 2019-05-28 RX ORDER — MELOXICAM 15 MG/1
1 TABLET ORAL DAILY
COMMUNITY
Start: 2019-05-24 | End: 2019-10-23 | Stop reason: SDUPTHER

## 2019-05-28 NOTE — PROGRESS NOTES
Subjective   Brenda David is a 58 y.o. female.    cc: follow up  History of Present Illness The patient comes in for check of their chronic medical issues which include Insomnia,Vitamin D Deficiency andchronic knee pain. SHe is due to undergo arthroscopy .      The following portions of the patient's history were reviewed and updated as appropriate: allergies, current medications, past family history, past medical history, past social history, past surgical history and problem list.    Review of Systems   Constitutional: Negative for fatigue and fever.   Respiratory: Negative for cough, chest tightness and stridor.    Cardiovascular: Negative for chest pain, palpitations and leg swelling.   Musculoskeletal: Positive for arthralgias, gait problem and myalgias.       Objective   Physical Exam   Constitutional: She appears well-developed and well-nourished.   HENT:   Head: Normocephalic and atraumatic.   Right Ear: External ear normal.   Left Ear: External ear normal.   Nose: Nose normal.   Mouth/Throat: Oropharynx is clear and moist.   Eyes: Conjunctivae are normal.   Neck: Normal range of motion.   Cardiovascular: Normal rate, regular rhythm and normal heart sounds. Exam reveals no gallop and no friction rub.   No murmur heard.  Pulmonary/Chest: Effort normal and breath sounds normal. No stridor. No respiratory distress. She has no wheezes. She has no rales.   Abdominal: Soft. Bowel sounds are normal. She exhibits no distension. There is no tenderness. There is no guarding.   Neurological: She is alert.   Skin: Skin is warm and dry.   Vitals reviewed.        Assessment/Plan   Brenda was seen today for med refill.    Diagnoses and all orders for this visit:    Right knee pain, unspecified chronicity    Vitamin D deficiency  -     Vitamin D 25 hydroxy; Future    Insomnia, unspecified type  -     Comprehensive metabolic panel; Future  -     CBC w AUTO Differential; Future    Other orders  -     DULoxetine  (CYMBALTA) 60 MG capsule; Take 1 capsule by mouth Daily.  -     pantoprazole (PROTONIX) 40 MG EC tablet; Take 1 tablet by mouth Daily.    Return to the clinic in 3 month/s.  Will contact with results as needed.

## 2019-05-29 ENCOUNTER — HOSPITAL ENCOUNTER (OUTPATIENT)
Facility: HOSPITAL | Age: 59
Setting detail: HOSPITAL OUTPATIENT SURGERY
Discharge: HOME | End: 2019-05-29
Attending: ORTHOPAEDIC SURGERY | Admitting: ORTHOPAEDIC SURGERY

## 2019-05-29 ENCOUNTER — ANESTHESIA (OUTPATIENT)
Dept: PERIOP | Facility: HOSPITAL | Age: 59
End: 2019-05-29

## 2019-05-29 ENCOUNTER — ANESTHESIA EVENT (OUTPATIENT)
Dept: PERIOP | Facility: HOSPITAL | Age: 59
End: 2019-05-29

## 2019-05-29 VITALS
BODY MASS INDEX: 38.87 KG/M2 | DIASTOLIC BLOOD PRESSURE: 60 MMHG | WEIGHT: 219.36 LBS | SYSTOLIC BLOOD PRESSURE: 126 MMHG | HEART RATE: 93 BPM | RESPIRATION RATE: 18 BRPM | TEMPERATURE: 97.7 F | OXYGEN SATURATION: 97 % | HEIGHT: 63 IN

## 2019-05-29 DIAGNOSIS — M23.92 INTERNAL DERANGEMENT OF LEFT KNEE: ICD-10-CM

## 2019-05-29 DIAGNOSIS — M71.21 BAKER'S CYST OF KNEE, RIGHT: ICD-10-CM

## 2019-05-29 DIAGNOSIS — M25.461 EFFUSION, RIGHT KNEE: ICD-10-CM

## 2019-05-29 DIAGNOSIS — M94.261 CHONDROMALACIA, RIGHT KNEE: ICD-10-CM

## 2019-05-29 DIAGNOSIS — Z98.890 STATUS POST ARTHROSCOPIC SURGERY OF RIGHT KNEE: ICD-10-CM

## 2019-05-29 DIAGNOSIS — M23.91 INTERNAL DERANGEMENT OF RIGHT KNEE: ICD-10-CM

## 2019-05-29 DIAGNOSIS — S83.241D TEAR OF MEDIAL MENISCUS OF RIGHT KNEE, CURRENT, UNSPECIFIED TEAR TYPE, SUBSEQUENT ENCOUNTER: ICD-10-CM

## 2019-05-29 PROCEDURE — 25010000002 PROPOFOL 10 MG/ML EMULSION: Performed by: NURSE ANESTHETIST, CERTIFIED REGISTERED

## 2019-05-29 PROCEDURE — 25010000002 MIDAZOLAM PER 1 MG: Performed by: NURSE ANESTHETIST, CERTIFIED REGISTERED

## 2019-05-29 PROCEDURE — 25010000002 FENTANYL CITRATE (PF) 100 MCG/2ML SOLUTION: Performed by: NURSE ANESTHETIST, CERTIFIED REGISTERED

## 2019-05-29 PROCEDURE — 25010000002 DEXAMETHASONE PER 1 MG: Performed by: NURSE ANESTHETIST, CERTIFIED REGISTERED

## 2019-05-29 PROCEDURE — 25010000002 ONDANSETRON PER 1 MG: Performed by: NURSE ANESTHETIST, CERTIFIED REGISTERED

## 2019-05-29 PROCEDURE — 25010000002 MORPHINE PER 10 MG: Performed by: ORTHOPAEDIC SURGERY

## 2019-05-29 PROCEDURE — 25010000002 HYDROMORPHONE 1 MG/ML SOLUTION: Performed by: NURSE ANESTHETIST, CERTIFIED REGISTERED

## 2019-05-29 PROCEDURE — 29881 ARTHRS KNE SRG MNISECTMY M/L: CPT | Performed by: ORTHOPAEDIC SURGERY

## 2019-05-29 RX ORDER — DEXAMETHASONE SODIUM PHOSPHATE 4 MG/ML
INJECTION, SOLUTION INTRA-ARTICULAR; INTRALESIONAL; INTRAMUSCULAR; INTRAVENOUS; SOFT TISSUE AS NEEDED
Status: DISCONTINUED | OUTPATIENT
Start: 2019-05-29 | End: 2019-05-29 | Stop reason: SURG

## 2019-05-29 RX ORDER — LIDOCAINE HYDROCHLORIDE 10 MG/ML
0.5 INJECTION, SOLUTION INFILTRATION; PERINEURAL ONCE AS NEEDED
Status: COMPLETED | OUTPATIENT
Start: 2019-05-29 | End: 2019-05-29

## 2019-05-29 RX ORDER — SODIUM CHLORIDE, SODIUM GLUCONATE, SODIUM ACETATE, POTASSIUM CHLORIDE, AND MAGNESIUM CHLORIDE 526; 502; 368; 37; 30 MG/100ML; MG/100ML; MG/100ML; MG/100ML; MG/100ML
1000 INJECTION, SOLUTION INTRAVENOUS CONTINUOUS
Status: DISCONTINUED | OUTPATIENT
Start: 2019-05-29 | End: 2019-05-29 | Stop reason: HOSPADM

## 2019-05-29 RX ORDER — MORPHINE SULFATE 10 MG/ML
INJECTION, SOLUTION INTRAMUSCULAR; INTRAVENOUS AS NEEDED
Status: DISCONTINUED | OUTPATIENT
Start: 2019-05-29 | End: 2019-05-29 | Stop reason: HOSPADM

## 2019-05-29 RX ORDER — ONDANSETRON 2 MG/ML
INJECTION INTRAMUSCULAR; INTRAVENOUS AS NEEDED
Status: DISCONTINUED | OUTPATIENT
Start: 2019-05-29 | End: 2019-05-29 | Stop reason: SURG

## 2019-05-29 RX ORDER — MIDAZOLAM HYDROCHLORIDE 1 MG/ML
INJECTION INTRAMUSCULAR; INTRAVENOUS AS NEEDED
Status: DISCONTINUED | OUTPATIENT
Start: 2019-05-29 | End: 2019-05-29 | Stop reason: SURG

## 2019-05-29 RX ORDER — BUPIVACAINE HCL/0.9 % NACL/PF 0.1 %
2 PLASTIC BAG, INJECTION (ML) EPIDURAL ONCE
Status: COMPLETED | OUTPATIENT
Start: 2019-05-29 | End: 2019-05-29

## 2019-05-29 RX ORDER — HYDROCODONE BITARTRATE AND ACETAMINOPHEN 7.5; 325 MG/1; MG/1
1 TABLET ORAL EVERY 4 HOURS PRN
Qty: 30 TABLET | Refills: 0 | Status: SHIPPED | OUTPATIENT
Start: 2019-05-29 | End: 2019-10-23 | Stop reason: DRUGHIGH

## 2019-05-29 RX ORDER — HYDROCODONE BITARTRATE AND ACETAMINOPHEN 7.5; 325 MG/1; MG/1
1 TABLET ORAL ONCE AS NEEDED
Status: DISCONTINUED | OUTPATIENT
Start: 2019-05-29 | End: 2019-05-29 | Stop reason: HOSPADM

## 2019-05-29 RX ORDER — FENTANYL CITRATE 50 UG/ML
INJECTION, SOLUTION INTRAMUSCULAR; INTRAVENOUS AS NEEDED
Status: DISCONTINUED | OUTPATIENT
Start: 2019-05-29 | End: 2019-05-29 | Stop reason: SURG

## 2019-05-29 RX ORDER — ONDANSETRON 2 MG/ML
4 INJECTION INTRAMUSCULAR; INTRAVENOUS ONCE AS NEEDED
Status: DISCONTINUED | OUTPATIENT
Start: 2019-05-29 | End: 2019-05-29 | Stop reason: HOSPADM

## 2019-05-29 RX ORDER — BUPIVACAINE HYDROCHLORIDE AND EPINEPHRINE 2.5; 5 MG/ML; UG/ML
INJECTION, SOLUTION EPIDURAL; INFILTRATION; INTRACAUDAL; PERINEURAL AS NEEDED
Status: DISCONTINUED | OUTPATIENT
Start: 2019-05-29 | End: 2019-05-29 | Stop reason: HOSPADM

## 2019-05-29 RX ORDER — PROPOFOL 10 MG/ML
VIAL (ML) INTRAVENOUS AS NEEDED
Status: DISCONTINUED | OUTPATIENT
Start: 2019-05-29 | End: 2019-05-29 | Stop reason: SURG

## 2019-05-29 RX ORDER — ALBUTEROL SULFATE 2.5 MG/3ML
2.5 SOLUTION RESPIRATORY (INHALATION) ONCE AS NEEDED
Status: DISCONTINUED | OUTPATIENT
Start: 2019-05-29 | End: 2019-05-29 | Stop reason: HOSPADM

## 2019-05-29 RX ORDER — LIDOCAINE HYDROCHLORIDE 20 MG/ML
INJECTION, SOLUTION INFILTRATION; PERINEURAL AS NEEDED
Status: DISCONTINUED | OUTPATIENT
Start: 2019-05-29 | End: 2019-05-29 | Stop reason: SURG

## 2019-05-29 RX ADMIN — FENTANYL CITRATE 100 MCG: 50 INJECTION, SOLUTION INTRAMUSCULAR; INTRAVENOUS at 09:49

## 2019-05-29 RX ADMIN — HYDROMORPHONE HYDROCHLORIDE 0.5 MG: 1 INJECTION, SOLUTION INTRAMUSCULAR; INTRAVENOUS; SUBCUTANEOUS at 10:52

## 2019-05-29 RX ADMIN — FENTANYL CITRATE 25 MCG: 50 INJECTION, SOLUTION INTRAMUSCULAR; INTRAVENOUS at 10:22

## 2019-05-29 RX ADMIN — HYDROMORPHONE HYDROCHLORIDE 0.5 MG: 1 INJECTION, SOLUTION INTRAMUSCULAR; INTRAVENOUS; SUBCUTANEOUS at 11:22

## 2019-05-29 RX ADMIN — HYDROMORPHONE HYDROCHLORIDE 0.5 MG: 1 INJECTION, SOLUTION INTRAMUSCULAR; INTRAVENOUS; SUBCUTANEOUS at 11:37

## 2019-05-29 RX ADMIN — PROPOFOL 200 MG: 10 INJECTION, EMULSION INTRAVENOUS at 09:49

## 2019-05-29 RX ADMIN — MEPERIDINE HYDROCHLORIDE 12.5 MG: 25 INJECTION, SOLUTION INTRAMUSCULAR; INTRAVENOUS; SUBCUTANEOUS at 11:14

## 2019-05-29 RX ADMIN — ONDANSETRON 4 MG: 2 INJECTION INTRAMUSCULAR; INTRAVENOUS at 10:09

## 2019-05-29 RX ADMIN — MIDAZOLAM HYDROCHLORIDE 2 MG: 2 INJECTION, SOLUTION INTRAMUSCULAR; INTRAVENOUS at 09:44

## 2019-05-29 RX ADMIN — FENTANYL CITRATE 50 MCG: 50 INJECTION, SOLUTION INTRAMUSCULAR; INTRAVENOUS at 10:18

## 2019-05-29 RX ADMIN — FENTANYL CITRATE 25 MCG: 50 INJECTION, SOLUTION INTRAMUSCULAR; INTRAVENOUS at 10:29

## 2019-05-29 RX ADMIN — SODIUM CHLORIDE, SODIUM GLUCONATE, SODIUM ACETATE, POTASSIUM CHLORIDE, AND MAGNESIUM CHLORIDE 1000 ML: 526; 502; 368; 37; 30 INJECTION, SOLUTION INTRAVENOUS at 07:48

## 2019-05-29 RX ADMIN — MEPERIDINE HYDROCHLORIDE 12.5 MG: 25 INJECTION, SOLUTION INTRAMUSCULAR; INTRAVENOUS; SUBCUTANEOUS at 11:24

## 2019-05-29 RX ADMIN — Medication 2 G: at 09:54

## 2019-05-29 RX ADMIN — LIDOCAINE HYDROCHLORIDE 0.5 ML: 10 INJECTION, SOLUTION INFILTRATION; PERINEURAL at 07:47

## 2019-05-29 RX ADMIN — LIDOCAINE HYDROCHLORIDE 100 MG: 20 INJECTION, SOLUTION INFILTRATION; PERINEURAL at 09:49

## 2019-05-29 RX ADMIN — DEXAMETHASONE SODIUM PHOSPHATE 4 MG: 4 INJECTION, SOLUTION INTRAMUSCULAR; INTRAVENOUS at 10:09

## 2019-05-29 RX ADMIN — HYDROMORPHONE HYDROCHLORIDE 0.5 MG: 1 INJECTION, SOLUTION INTRAMUSCULAR; INTRAVENOUS; SUBCUTANEOUS at 11:07

## 2019-05-29 RX ADMIN — HYDROCODONE BITARTRATE AND ACETAMINOPHEN 1 TABLET: 7.5; 325 TABLET ORAL at 12:24

## 2019-05-29 NOTE — ANESTHESIA PREPROCEDURE EVALUATION
Anesthesia Evaluation     Patient summary reviewed   no history of anesthetic complications:  NPO Solid Status: > 8 hours  NPO Liquid Status: > 8 hours           Airway   Mallampati: II  TM distance: >3 FB  Neck ROM: full  no difficulty expected  Dental - normal exam     Pulmonary     breath sounds clear to auscultation  (+) a smoker Former,   Cardiovascular   Exercise tolerance: good (4-7 METS)    NYHA Classification: II  ECG reviewed  Rhythm: regular  Rate: normal    (+) hyperlipidemia,   (-) murmur      Neuro/Psych  (+) headaches (Migraine.), psychiatric history Anxiety,     GI/Hepatic/Renal/Endo    (+) obesity,  GERD,      Musculoskeletal     Abdominal   (+) obese,    Substance History      OB/GYN          Other   (+) arthritis                       Anesthesia Plan    ASA 3     general     intravenous induction   Anesthetic plan, all risks, benefits, and alternatives have been provided, discussed and informed consent has been obtained with: patient and child.

## 2019-05-29 NOTE — ANESTHESIA POSTPROCEDURE EVALUATION
Patient: Brenda David    Procedure Summary     Date:  05/29/19 Room / Location:  [facility] OR [address] [place] [address] OR    Anesthesia Start:  0944 Anesthesia Stop:  1042    Procedure:  Right KNEE ARTHROSCOPY with debridement medial meniscus (Right Knee) Diagnosis:       Internal derangement of right knee      Internal derangement of left knee      Chondromalacia, right knee      Tear of medial meniscus of right knee, current, unspecified tear type, subsequent encounter      Baker's cyst of knee, right      Status post arthroscopic surgery of right knee      Effusion, right knee      (Internal derangement of right knee [M23.91])      (Internal derangement of left knee [M23.92])      (Chondromalacia, right knee [M94.261])      (Tear of medial meniscus of right knee, current, unspecified tear type, subsequent encounter [S83.241D])      (Baker's cyst of knee, right [M71.21])      (Status post arthroscopic surgery of right knee [Z98.890])      (Effusion, right knee [M25.461])    Surgeon:  Carlos Manuel Sanchez MD Provider:  Curry Portillo MD    Anesthesia Type:  general ASA Status:  3          Anesthesia Type: general  Last vitals  BP   129/68 (05/29/19 0723)   Temp   98.4 °F (36.9 °C) (05/29/19 0723)   Pulse   72 (05/29/19 0723)   Resp   18 (05/29/19 0723)     SpO2   95 % (05/29/19 0723)     Post Anesthesia Care and Evaluation    Patient location during evaluation: bedside  Patient participation: waiting for patient participation  Level of consciousness: sleepy but conscious  Pain score: 0  Pain management: adequate  Airway patency: patent  Anesthetic complications: No anesthetic complications  PONV Status: none  Cardiovascular status: acceptable  Respiratory status: acceptable  Hydration status: acceptable

## 2019-05-29 NOTE — ANESTHESIA PROCEDURE NOTES
Airway    General Information and Staff    Patient location during procedure: OB    Indications and Patient Condition  Indications for airway management: airway protection    Preoxygenated: yes  MILS maintained throughout  Mask difficulty assessment: 1 - vent by mask    Final Airway Details  Final airway type: supraglottic airway      Successful airway: I-gel  Size 4    Number of attempts at approach: 1

## 2019-06-10 ENCOUNTER — OFFICE VISIT (OUTPATIENT)
Dept: ORTHOPEDIC SURGERY | Facility: CLINIC | Age: 59
End: 2019-06-10

## 2019-06-10 DIAGNOSIS — M23.91 INTERNAL DERANGEMENT OF RIGHT KNEE: ICD-10-CM

## 2019-06-10 DIAGNOSIS — M71.21 BAKER'S CYST OF KNEE, RIGHT: ICD-10-CM

## 2019-06-10 DIAGNOSIS — S83.241D TEAR OF MEDIAL MENISCUS OF RIGHT KNEE, CURRENT, UNSPECIFIED TEAR TYPE, SUBSEQUENT ENCOUNTER: ICD-10-CM

## 2019-06-10 DIAGNOSIS — M25.561 CHRONIC PAIN OF RIGHT KNEE: ICD-10-CM

## 2019-06-10 DIAGNOSIS — Z98.890 STATUS POST ARTHROSCOPY OF RIGHT KNEE: Primary | ICD-10-CM

## 2019-06-10 DIAGNOSIS — G89.29 CHRONIC PAIN OF RIGHT KNEE: ICD-10-CM

## 2019-06-10 PROCEDURE — 99024 POSTOP FOLLOW-UP VISIT: CPT | Performed by: NURSE PRACTITIONER

## 2019-06-10 NOTE — PROGRESS NOTES
Knee Scope follow Up 1st Visit      Patient: Brenda David    YOB: 1960      Chief Complaint   Patient presents with   • Right Knee - Post-op   • Suture / Staple Removal     History of Present Illness: Patient presents to office for postoperative follow-up status post right knee arthroscopy with debridement of medial meniscus performed per Dr. Sanchez on 5/29/2019.  Patient is doing well postoperatively with no complaints or concerns noted.  She reports her right knee pain is significantly improved.  She denies any pain today.  She reports she has had minimal swelling and was diligent with ice therapy every 2 hours immediately following surgery.  Patient is very pleased with her outcome and progress at this time.  Patient continues with her current course of physical therapy at [facility].  Sutures are removed today.  Patient is ambulatory in office today with a steady gait and no assistive device.    Allergies:   Allergies   Allergen Reactions   • Bactrim [Sulfamethoxazole-Trimethoprim] Hives   • Adhesive Tape Other (See Comments)     BLISTERS   • Amoxicillin-Pot Clavulanate Diarrhea   • Latex Rash   • Pregabalin Hives   • Sulfa Antibiotics Hives       Medications:   Current Outpatient Medications   Medication Sig Dispense Refill   • Cholecalciferol (VITAMIN D PO) Take 2,000 Units by mouth Daily.     • DULoxetine (CYMBALTA) 60 MG capsule Take 1 capsule by mouth Daily. 90 capsule 1   • HYDROcodone-acetaminophen (NORCO) 7.5-325 MG per tablet Take 1 tablet by mouth Every 4 (Four) Hours As Needed for Moderate Pain. 30 tablet 0   • MAGNESIUM PO Take 500 mg by mouth Daily.     • melatonin 1 MG tablet Take  by mouth At Night As Needed for Sleep.     • meloxicam (MOBIC) 15 MG tablet Take 1 tablet by mouth Daily.     • Multiple Vitamin (MULTI-VITAMIN DAILY PO) Take 1 tablet by mouth Daily.     • pantoprazole (PROTONIX) 40 MG EC tablet Take 1 tablet by mouth Daily. 90 tablet 1   • Selenium (SELENIMIN PO)  Take  by mouth Daily.     • vitamin C (ASCORBIC ACID) 500 MG tablet Take 500 mg by mouth Daily. Time released       No current facility-administered medications for this visit.        Physical Exam: 59 y.o. female  General Appearance:    Alert, cooperative, in no acute distress. There were no vitals filed for this visit.     Patient is alert and oriented ×3. No acute distress. Normal mood and affect.    Physical Exam of the Right Knee:  Surgical incisions are well approximated with no erythema and no drainage.  No signs of infection noted.  No joint swelling or effusion noted.  No knee tenderness.  Extension equals 0 degrees, flexion equals 130 degrees.  Calf is soft and nontender.  Homans sign is negative.  No signs of DVT.    Diagnoses and all orders for this visit:    Status post arthroscopy of right knee    Internal derangement of right knee    Tear of medial meniscus of right knee, current, unspecified tear type, subsequent encounter    Baker's cyst of knee, right    Chronic pain of right knee      PLAN: Patient is doing well postoperatively.  She reports her right knee pain is significantly improved following surgery.  Patient denies any pain currently.  She reports she has had minimal swelling following surgery.  Patient is very pleased with her outcome.  Sutures are removed today and Steri-Strips placed.  Patient is instructed to continue to monitor the incisions for any signs of infection including increased redness, increased swelling, increased tenderness, increased warmth and/or purulent drainage.  Patient is instructed to notify the office immediately if any such signs of infection are noted.  Continue to progress activity as tolerated.  Continue with current course of physical therapy and home exercise program.  Continue with ice therapy to the right knee as needed to minimize pain/swelling.  Continue Meloxicam daily.  Patient also has Norco at home to take as needed for worse pain.  Follow-up in 4  weeks for recheck.         This document has been electronically signed by LEIDA Street on Maria Elena 10, 2019 1:53 PM    06/10/19 at 1:29 PM by LEIDA Street

## 2019-06-18 RX ORDER — MELOXICAM 15 MG/1
15 TABLET ORAL DAILY
Qty: 30 TABLET | Refills: 3 | Status: SHIPPED | OUTPATIENT
Start: 2019-06-18 | End: 2019-10-20 | Stop reason: SDUPTHER

## 2019-10-20 DIAGNOSIS — M25.561 CHRONIC PAIN OF RIGHT KNEE: ICD-10-CM

## 2019-10-20 DIAGNOSIS — S83.241D TEAR OF MEDIAL MENISCUS OF RIGHT KNEE, CURRENT, UNSPECIFIED TEAR TYPE, SUBSEQUENT ENCOUNTER: ICD-10-CM

## 2019-10-20 DIAGNOSIS — M23.91 INTERNAL DERANGEMENT OF RIGHT KNEE: ICD-10-CM

## 2019-10-20 DIAGNOSIS — M71.21 BAKER'S CYST OF KNEE, RIGHT: ICD-10-CM

## 2019-10-20 DIAGNOSIS — G89.29 CHRONIC PAIN OF RIGHT KNEE: ICD-10-CM

## 2019-10-20 DIAGNOSIS — Z98.890 STATUS POST ARTHROSCOPY OF RIGHT KNEE: Primary | ICD-10-CM

## 2019-10-21 RX ORDER — MELOXICAM 15 MG/1
TABLET ORAL
Qty: 30 TABLET | Refills: 3 | Status: SHIPPED | OUTPATIENT
Start: 2019-10-21 | End: 2019-11-26

## 2019-10-23 ENCOUNTER — OFFICE VISIT (OUTPATIENT)
Dept: ORTHOPEDIC SURGERY | Facility: CLINIC | Age: 59
End: 2019-10-23

## 2019-10-23 VITALS — WEIGHT: 219 LBS | BODY MASS INDEX: 38.8 KG/M2 | HEIGHT: 63 IN

## 2019-10-23 DIAGNOSIS — M25.461 EFFUSION, RIGHT KNEE: ICD-10-CM

## 2019-10-23 DIAGNOSIS — M25.561 CHRONIC PAIN OF RIGHT KNEE: ICD-10-CM

## 2019-10-23 DIAGNOSIS — M94.261 CHONDROMALACIA, RIGHT KNEE: ICD-10-CM

## 2019-10-23 DIAGNOSIS — M71.21 BAKER'S CYST OF KNEE, RIGHT: ICD-10-CM

## 2019-10-23 DIAGNOSIS — S83.241D TEAR OF MEDIAL MENISCUS OF RIGHT KNEE, CURRENT, UNSPECIFIED TEAR TYPE, SUBSEQUENT ENCOUNTER: ICD-10-CM

## 2019-10-23 DIAGNOSIS — G89.29 CHRONIC PAIN OF RIGHT KNEE: ICD-10-CM

## 2019-10-23 DIAGNOSIS — M23.91 INTERNAL DERANGEMENT OF RIGHT KNEE: ICD-10-CM

## 2019-10-23 DIAGNOSIS — Z98.890 STATUS POST ARTHROSCOPY OF RIGHT KNEE: Primary | ICD-10-CM

## 2019-10-23 PROCEDURE — 99214 OFFICE O/P EST MOD 30 MIN: CPT | Performed by: NURSE PRACTITIONER

## 2019-10-23 PROCEDURE — 20610 DRAIN/INJ JOINT/BURSA W/O US: CPT | Performed by: NURSE PRACTITIONER

## 2019-10-23 RX ORDER — MELOXICAM 15 MG/1
15 TABLET ORAL DAILY
Qty: 30 TABLET | Refills: 3 | Status: SHIPPED | OUTPATIENT
Start: 2019-10-23 | End: 2019-11-04

## 2019-10-23 RX ORDER — HYDROCODONE BITARTRATE AND ACETAMINOPHEN 5; 325 MG/1; MG/1
1 TABLET ORAL EVERY 8 HOURS PRN
Qty: 30 TABLET | Refills: 0 | Status: SHIPPED | OUTPATIENT
Start: 2019-10-23 | End: 2019-11-02

## 2019-10-23 RX ADMIN — LIDOCAINE HYDROCHLORIDE 2 ML: 10 INJECTION, SOLUTION EPIDURAL; INFILTRATION; INTRACAUDAL; PERINEURAL at 08:45

## 2019-10-23 RX ADMIN — TRIAMCINOLONE ACETONIDE 40 MG: 40 INJECTION, SUSPENSION INTRA-ARTICULAR; INTRAMUSCULAR at 08:45

## 2019-10-23 NOTE — PROGRESS NOTES
"Brenda David is a 59 y.o. female returns for     Chief Complaint   Patient presents with   • Right Knee - Follow-up       HISTORY OF PRESENT ILLNESS: Patient presents to office for follow-up of chronic right knee pain.  Patient reports increased pain and swelling over the last few weeks.  Patient states she has recently been in [place] for one month.  She states she was having some milder right knee pain that she noticed on the drive down to [place].  While she was there, she did a lot of stair climbing and walking in deep sand.  She reports that her right knee pain significantly worsened.  She has been trying to manage the pain and swelling with elevation and ice therapy.  She has continued to take Meloxicam daily.  Patient is concerned about a new meniscal injury.  She does report locking and catching symptoms in the right knee that occur with certain motions of the knee.  She has increased pain with rotation and pivoting.  Patient denies any known injury.  Patient underwent a right knee arthroscopy with debridement of medial meniscus tear performed per Dr. Sanchez on 5/29/2019 and had improvement following surgery.     CONCURRENT MEDICAL HISTORY:    The following portions of the patient's history were reviewed and updated as appropriate: allergies, current medications, past family history, past medical history, past social history, past surgical history and problem list.     ROS  No fevers or chills.  No chest pain or shortness of air.  No GI or  disturbances. Right knee pain/swelling.     PHYSICAL EXAMINATION:       Ht 160 cm (63\")   Wt 99.3 kg (219 lb)   LMP 07/02/2002 (Within Months)   BMI 38.79 kg/m²     Physical Exam   Constitutional: She is oriented to person, place, and time. Vital signs are normal. She appears well-developed and well-nourished. She is active and cooperative. She does not appear ill. No distress.   HENT:   Head: Normocephalic.   Pulmonary/Chest: Effort normal. No respiratory " distress.   Abdominal: Soft. She exhibits no distension.   Musculoskeletal: She exhibits edema (Right knee) and tenderness (Right knee). She exhibits no deformity.        Right knee: She exhibits effusion.        Left knee: She exhibits no effusion.   Neurological: She is alert and oriented to person, place, and time. GCS eye subscore is 4. GCS verbal subscore is 5. GCS motor subscore is 6.   Skin: Skin is warm, dry and intact. Capillary refill takes less than 2 seconds. No erythema.   Psychiatric: She has a normal mood and affect. Her speech is normal and behavior is normal. Judgment and thought content normal. Cognition and memory are normal.   Vitals reviewed.      GAIT:     []  Normal  [x]  Antalgic    Assistive device: [x]  None  []  Walker     []  Crutches  []  Cane     []  Wheelchair  []  Stretcher    Right Knee Exam     Tenderness   Right knee tenderness location: Diffuse, including posterior.    Range of Motion   Extension: 5   Flexion: 110     Tests   Lana:  Medial - positive Lateral - positive  Varus: negative Valgus: negative    Other   Erythema: absent  Sensation: normal  Pulse: present  Swelling: moderate  Effusion: effusion present    Comments:  Pain and limitations with range of motion.  Pain increases at the end range of flexion and with rotation of the knee.  Moderate swelling/effusion noted.  No erythema.  No warmth.  No signs of infection noted.  Diffuse tenderness to palpation, including the posterior aspect of the knee.      Left Knee Exam     Tenderness   The patient is experiencing no tenderness.     Range of Motion   Extension: 0   Flexion: 130     Tests   Lana:  Medial - negative Lateral - negative  Varus: negative Valgus: negative    Other   Erythema: absent  Sensation: normal  Pulse: present  Swelling: none  Effusion: no effusion present              ASSESSMENT:    Diagnoses and all orders for this visit:    Status post arthroscopy of right knee  -     Large Joint Arthrocentesis:  R knee  -     MRI Knee Right Without Contrast; Future  -     lidocaine PF 1% (XYLOCAINE) injection 2 mL  -     triamcinolone acetonide (KENALOG-40) injection 40 mg    Internal derangement of right knee  -     Large Joint Arthrocentesis: R knee  -     MRI Knee Right Without Contrast; Future  -     lidocaine PF 1% (XYLOCAINE) injection 2 mL  -     triamcinolone acetonide (KENALOG-40) injection 40 mg    Tear of medial meniscus of right knee, current, unspecified tear type, subsequent encounter  -     Large Joint Arthrocentesis: R knee  -     MRI Knee Right Without Contrast; Future  -     lidocaine PF 1% (XYLOCAINE) injection 2 mL  -     triamcinolone acetonide (KENALOG-40) injection 40 mg    Baker's cyst of knee, right  -     Large Joint Arthrocentesis: R knee  -     MRI Knee Right Without Contrast; Future  -     lidocaine PF 1% (XYLOCAINE) injection 2 mL  -     triamcinolone acetonide (KENALOG-40) injection 40 mg    Chronic pain of right knee  -     Large Joint Arthrocentesis: R knee  -     MRI Knee Right Without Contrast; Future  -     lidocaine PF 1% (XYLOCAINE) injection 2 mL  -     triamcinolone acetonide (KENALOG-40) injection 40 mg    Chondromalacia, right knee  -     Large Joint Arthrocentesis: R knee  -     MRI Knee Right Without Contrast; Future  -     lidocaine PF 1% (XYLOCAINE) injection 2 mL  -     triamcinolone acetonide (KENALOG-40) injection 40 mg    Effusion, right knee  -     Large Joint Arthrocentesis: R knee  -     MRI Knee Right Without Contrast; Future  -     lidocaine PF 1% (XYLOCAINE) injection 2 mL  -     triamcinolone acetonide (KENALOG-40) injection 40 mg    Other orders  -     HYDROcodone-acetaminophen (NORCO) 5-325 MG per tablet; Take 1 tablet by mouth Every 8 (Eight) Hours As Needed for Moderate Pain  or Severe Pain  for up to 10 days.  -     meloxicam (MOBIC) 15 MG tablet; Take 1 tablet by mouth Daily.      Large Joint Arthrocentesis: R knee  Date/Time: 10/23/2019 8:45 AM  Consent given  by: patient  Site marked: site marked  Timeout: Immediately prior to procedure a time out was called to verify the correct patient, procedure, equipment, support staff and site/side marked as required   Supporting Documentation  Indications: pain and joint swelling   Procedure Details  Location: knee - R knee  Preparation: Patient was prepped and draped in the usual sterile fashion  Needle size: 18 G  Approach: lateral  Medications administered: 2 mL lidocaine PF 1% 1 %; 40 mg triamcinolone acetonide 40 MG/ML  Aspirate amount: 15 mL  Aspirate: yellow and clear  Patient tolerance: patient tolerated the procedure well with no immediate complications      PLAN    Patient complains of increased right knee pain and swelling that started while she was in [place] recently.  She has been in [place] for approximately 1 month.  She denies any known injury.  Patient reports she had improved knee pain following her right knee arthroscopy with debridement of the medial meniscus per Dr. Sanchez on 5/29/2019.  Patient reports she had some right knee discomfort that she noticed on the drive down to [place] but after being there and walking up and down several flights of stairs and walking and deep sand, she started having much worse right knee pain/swelling/effusion.  She also complains of locking and catching symptoms in the right knee.  She has increased pain and symptoms with rotation of the knee.  We discussed suspicion for new meniscal tear.  Recommend MRI of right knee to evaluate for meniscal injury, bone contusion, fracture, ligament injury and/or cartilage abnormalities.  Patient has an effusion today on physical exam.  Aspiration is performed but only 15 cc of clear, yellow synovial fluid is returned.  An intra-articular injection of steroid is given today for management of knee pain/swelling/effusion/inflammation following aspiration.  An Ace wrap was applied today following aspiration/injection for some compression of the  knee.  Recommend rest and activity modification.  Recommend modified weightbearing off the right knee with use of a cane or walker.  Patient states that she has these items at home that she can use.  Recommend elevation of the right knee to minimize swelling.  Recommend ice therapy every 2-4 hours while awake to minimize pain/swelling/inflammation/effusion.  Recommend continue Meloxicam daily for consistent dosing of oral NSAIDs.  This is refilled for the patient today.  Patient can also take Tylenol as needed.  Norco is prescribed to take as needed for moderate to severe pain.  Patient states she primarily just needs some pain medication to help her rest at night due to knee pain.  Follow-up after MRI.    This patient has tried and failed a course of multiple treatment modalities which include but are not limited to the use of anti-inflammatories, acetaminophen, home exercises, rest/activity modification, ice therapy and elevation.  She reports her pain is severe at times. Therefore, I will recommend a short course of narcotic pain medication for this patient until their pain has been sufficiently reduced to a level that I deem acceptable to be treated with alternative treatment options. [facility] reviewed # [id number].    Return for follow up after MRI.        This document has been electronically signed by LEIDA Street on October 24, 2019 12:36 PM      LEIDA Street

## 2019-10-24 RX ORDER — TRIAMCINOLONE ACETONIDE 40 MG/ML
40 INJECTION, SUSPENSION INTRA-ARTICULAR; INTRAMUSCULAR
Status: COMPLETED | OUTPATIENT
Start: 2019-10-23 | End: 2019-10-23

## 2019-10-24 RX ORDER — LIDOCAINE HYDROCHLORIDE 10 MG/ML
2 INJECTION, SOLUTION EPIDURAL; INFILTRATION; INTRACAUDAL; PERINEURAL
Status: COMPLETED | OUTPATIENT
Start: 2019-10-23 | End: 2019-10-23

## 2019-10-31 ENCOUNTER — HOSPITAL ENCOUNTER (OUTPATIENT)
Dept: MRI IMAGING | Facility: HOSPITAL | Age: 59
Discharge: HOME | End: 2019-10-31
Admitting: NURSE PRACTITIONER

## 2019-10-31 DIAGNOSIS — M71.21 BAKER'S CYST OF KNEE, RIGHT: ICD-10-CM

## 2019-10-31 DIAGNOSIS — M25.561 CHRONIC PAIN OF RIGHT KNEE: ICD-10-CM

## 2019-10-31 DIAGNOSIS — M94.261 CHONDROMALACIA, RIGHT KNEE: ICD-10-CM

## 2019-10-31 DIAGNOSIS — M25.461 EFFUSION, RIGHT KNEE: ICD-10-CM

## 2019-10-31 DIAGNOSIS — G89.29 CHRONIC PAIN OF RIGHT KNEE: ICD-10-CM

## 2019-10-31 DIAGNOSIS — M23.91 INTERNAL DERANGEMENT OF RIGHT KNEE: ICD-10-CM

## 2019-10-31 DIAGNOSIS — S83.241D TEAR OF MEDIAL MENISCUS OF RIGHT KNEE, CURRENT, UNSPECIFIED TEAR TYPE, SUBSEQUENT ENCOUNTER: ICD-10-CM

## 2019-10-31 DIAGNOSIS — Z98.890 STATUS POST ARTHROSCOPY OF RIGHT KNEE: ICD-10-CM

## 2019-10-31 PROCEDURE — 73721 MRI JNT OF LWR EXTRE W/O DYE: CPT

## 2019-11-04 ENCOUNTER — OFFICE VISIT (OUTPATIENT)
Dept: ORTHOPEDIC SURGERY | Facility: CLINIC | Age: 59
End: 2019-11-04

## 2019-11-04 VITALS — HEIGHT: 63 IN | BODY MASS INDEX: 38.8 KG/M2 | WEIGHT: 219 LBS

## 2019-11-04 DIAGNOSIS — M71.21 BAKER'S CYST OF KNEE, RIGHT: ICD-10-CM

## 2019-11-04 DIAGNOSIS — M25.562 CHRONIC PAIN OF BOTH KNEES: ICD-10-CM

## 2019-11-04 DIAGNOSIS — Z98.890 STATUS POST ARTHROSCOPY OF RIGHT KNEE: Primary | ICD-10-CM

## 2019-11-04 DIAGNOSIS — M94.261 CHONDROMALACIA, RIGHT KNEE: ICD-10-CM

## 2019-11-04 DIAGNOSIS — M25.561 CHRONIC PAIN OF RIGHT KNEE: ICD-10-CM

## 2019-11-04 DIAGNOSIS — S83.241D TEAR OF MEDIAL MENISCUS OF RIGHT KNEE, CURRENT, UNSPECIFIED TEAR TYPE, SUBSEQUENT ENCOUNTER: ICD-10-CM

## 2019-11-04 DIAGNOSIS — G89.29 CHRONIC PAIN OF RIGHT KNEE: ICD-10-CM

## 2019-11-04 DIAGNOSIS — M23.91 INTERNAL DERANGEMENT OF RIGHT KNEE: ICD-10-CM

## 2019-11-04 DIAGNOSIS — M23.92 INTERNAL DERANGEMENT OF LEFT KNEE: ICD-10-CM

## 2019-11-04 DIAGNOSIS — M25.461 EFFUSION, RIGHT KNEE: ICD-10-CM

## 2019-11-04 DIAGNOSIS — G89.29 CHRONIC PAIN OF BOTH KNEES: ICD-10-CM

## 2019-11-04 DIAGNOSIS — M67.51 SYNOVIAL PLICA OF RIGHT KNEE: ICD-10-CM

## 2019-11-04 DIAGNOSIS — M25.561 CHRONIC PAIN OF BOTH KNEES: ICD-10-CM

## 2019-11-04 PROCEDURE — 99214 OFFICE O/P EST MOD 30 MIN: CPT | Performed by: NURSE PRACTITIONER

## 2019-11-04 NOTE — PROGRESS NOTES
"Brenda David is a 59 y.o. female returns for     Chief Complaint   Patient presents with   • Left Knee - Follow-up   • Right Knee - Follow-up, MRI results       HISTORY OF PRESENT ILLNESS: Patient presents to office for follow-up of chronic right knee pain that acutely worsened over the past several weeks and MRI results of right knee.  Patient underwent a right knee arthroscopy with debridement of medial meniscus performed per Dr. Sanchez on 5/29/2019 and had improvement following surgery.  Right knee pain acutely worsened while she was in [place] for about one month.  While there, patient did a lot of stair climbing and walking and deep sand.  Patient was last seen in office on 10/23/2019 for her acutely worsened right knee pain.  She had an aspiration of the right knee performed and was given an intra-articular injection of steroid, which improved her pain but only for a few days.  Patient states her pain has started to return in the last 2 days.  She primarily complains of pain with walking and at nighttime when she turns over in the bed and rotate her knee.  Patient complains of locking and catching symptoms with rotation of the knee.  Patient has continued to utilize ice frequently, which does improve her pain.  Patient continues to take Meloxicam daily.  Patient also complains of some increased left knee pain recently with pain that radiates throughout the lower leg.  Patient states it feels like a \"nerve pain\" in her left lower leg.     CONCURRENT MEDICAL HISTORY:    The following portions of the patient's history were reviewed and updated as appropriate: allergies, current medications, past family history, past medical history, past social history, past surgical history and problem list.     ROS  No fevers or chills.  No chest pain or shortness of air.  No GI or  disturbances. Right knee pain. Left knee pain.     PHYSICAL EXAMINATION:       Ht 160 cm (63\")   Wt 99.3 kg (219 lb)   LMP 07/02/2002 " (Within Months)   BMI 38.79 kg/m²     Physical Exam   Constitutional: She is oriented to person, place, and time. Vital signs are normal. She appears well-developed and well-nourished. She is active and cooperative. She does not appear ill. No distress.   HENT:   Head: Normocephalic.   Pulmonary/Chest: Effort normal. No respiratory distress.   Abdominal: Soft. She exhibits no distension.   Musculoskeletal: She exhibits edema (Mild, right knee) and tenderness (Right knee). She exhibits no deformity.        Right knee: She exhibits effusion.        Left knee: She exhibits no effusion.   Neurological: She is alert and oriented to person, place, and time. GCS eye subscore is 4. GCS verbal subscore is 5. GCS motor subscore is 6.   Skin: Skin is warm, dry and intact. Capillary refill takes less than 2 seconds. No erythema.   Psychiatric: She has a normal mood and affect. Her speech is normal and behavior is normal. Judgment and thought content normal. Cognition and memory are normal.   Vitals reviewed.      GAIT:     [x]  Normal  []  Antalgic    Assistive device: [x]  None  []  Walker     []  Crutches  []  Cane     []  Wheelchair  []  Stretcher    Right Knee Exam     Tenderness   The patient is experiencing tenderness in the MCL and medial joint line (Diffuse, including posterior).    Range of Motion   Extension: 0   Flexion: 120     Tests   Lana:  Medial - positive Lateral - negative  Varus: negative Valgus: negative    Other   Erythema: absent  Sensation: normal  Pulse: present  Swelling: mild  Effusion: effusion present    Comments:  Mild pain and limitations with range of motion, improved from prior exam.  Pain increases at the end range of flexion and with rotation of the knee.  Mild swelling/effusion noted, improved from prior exam.  No erythema.  No warmth.  No signs of infection noted. Tenderness to palpation medially and in the posterior aspect of the knee.      Left Knee Exam     Tenderness   The patient is  experiencing no tenderness.     Range of Motion   Extension: 0   Flexion: 130     Tests   Lana:  Medial - negative Lateral - positive (Mild)  Varus: negative Valgus: negative    Other   Erythema: absent  Sensation: normal  Pulse: present  Swelling: none  Effusion: no effusion present            Mri Knee Right Without Contrast    Result Date: 10/31/2019  Narrative: MRI right knee. HISTORY: Knee pain, effusion. Status post arthroscopic surgery. Prior exam: MRI right knee March 4, 2019. FINDINGS: There is an intra-articular and suprapatellar effusion. Prominent synovial plica suprapatellar recess. There is a Baker's cyst. Effusion and Baker's cyst are smaller  in comparison to prior exam. Numerous small areas of low signal intensity within the joint fluid, loose bodies or rice bodies. Normal patellar and quadriceps tendon. There is a grade II chondromalacia patellar articular hyaline cartilage. Cartilage abnormalities appear less pronounced in comparison to prior exam March 4, 2019. Degenerative osteoarthritic changes tibiofemoral joint with joint space narrowing and osteophyte production especially medial aspect. Cartilage thinning, grade III chondromalacia distal femur and proximal tibia. Grade III chondromalacia is more pronounced in comparison prior exam. Normal anterior and posterior cruciate ligaments. Normal lateral meniscus. Irregularities posterior horn medial meniscus reflecting prior arthroscopic surgery. Similar appearance to prior exam.     Impression: CONCLUSION: Intra-articular ,suprapatellar effusion and Baker's cyst, smaller than on prior exam. Abnormal medial meniscus probably representing combination of residual tear and postsurgical changes, partial meniscectomy. Similar appearance to prior exam. Grade II chondromalacia patellar articular hyaline cartilage. Cartilage abnormalities appear less pronounced in comparison to prior exam. Degenerative osteoarthritic changes medial aspect tibiofemoral  joint with cartilage thinning, grade III chondromalacia and osteophyte production. Cartilage thinning, grade III chondromalacia  is more pronounced in comparison with prior exam. Electronically signed by:  Mario Forbes MD  10/31/2019 1:13 PM CDT Workstation: [id number]        ASSESSMENT:    Diagnoses and all orders for this visit:    Status post arthroscopy of right knee    Internal derangement of right knee    Chronic pain of right knee    Chondromalacia, right knee    Effusion, right knee    Internal derangement of left knee    Chronic pain of both knees    Tear of medial meniscus of right knee, current, unspecified tear type, subsequent encounter    Baker's cyst of knee, right    Synovial plica of right knee    PLAN    MRI right knee reviewed and results discussed with patient.  We discussed abnormalities at the medial meniscus, which may represent residual tear versus postsurgical changes.  We also discussed evidence of a prominent synovial plica, which appears to be new since her prior exam and prior arthroscopy.  She primarily complains of pain along the medial aspect of her knee and around to the posterior knee.  Patient was last seen in office on 10/23/2019 with an aspiration performed and given an intra-articular injection of steroid, which improved her pain for several days.  However, pain has started to return in the last few days.  She continues to have some locking and catching symptoms in the right knee.  Pain is much worse at night when she rotates the knee to turn over.  She has also tried ice therapy, which does provide some temporary relief.  She has continued to take oral NSAIDs consistently.  We discussed follow-up with Dr. Sanchez to discuss further treatment options.  She is not interested in returning to physical therapy at this time and wants to proceed with knee arthroscopy if that is what is needed.  Patient also complains of some increased left knee pain.  She states that she has a known  meniscal tear in the left knee as well.  No locking or catching symptoms are reported in the left knee.  Recommend to continue with rest and activity modification with limited weightbearing on the right knee.  Recommend to continue with elevation and ice therapy to the bilateral knees to minimize pain/swelling/inflammation.  Continue Meloxicam daily for consistent dosing of oral NSAIDs.  Follow-up with Dr. Sanchez at next available appointment to discuss further treatment recommendations for right knee pain.     Return for follow up with Dr. Sanchez.        This document has been electronically signed by LEIDA Street on November 4, 2019 3:17 PM      LEIDA Street

## 2019-11-05 ENCOUNTER — OFFICE VISIT (OUTPATIENT)
Dept: ORTHOPEDIC SURGERY | Facility: CLINIC | Age: 59
End: 2019-11-05

## 2019-11-05 ENCOUNTER — TELEPHONE (OUTPATIENT)
Dept: ENDOCRINOLOGY | Facility: CLINIC | Age: 59
End: 2019-11-05

## 2019-11-05 VITALS — HEIGHT: 63 IN | BODY MASS INDEX: 38.79 KG/M2

## 2019-11-05 DIAGNOSIS — G89.29 CHRONIC PAIN OF RIGHT KNEE: ICD-10-CM

## 2019-11-05 DIAGNOSIS — S83.241D TEAR OF MEDIAL MENISCUS OF RIGHT KNEE, CURRENT, UNSPECIFIED TEAR TYPE, SUBSEQUENT ENCOUNTER: ICD-10-CM

## 2019-11-05 DIAGNOSIS — M94.261 CHONDROMALACIA, RIGHT KNEE: ICD-10-CM

## 2019-11-05 DIAGNOSIS — M71.21 BAKER'S CYST OF KNEE, RIGHT: ICD-10-CM

## 2019-11-05 DIAGNOSIS — M67.51 SYNOVIAL PLICA OF RIGHT KNEE: ICD-10-CM

## 2019-11-05 DIAGNOSIS — M25.561 CHRONIC PAIN OF RIGHT KNEE: ICD-10-CM

## 2019-11-05 DIAGNOSIS — M23.91 INTERNAL DERANGEMENT OF RIGHT KNEE: ICD-10-CM

## 2019-11-05 DIAGNOSIS — Z98.890 STATUS POST ARTHROSCOPY OF RIGHT KNEE: Primary | ICD-10-CM

## 2019-11-05 DIAGNOSIS — M25.461 EFFUSION, RIGHT KNEE: ICD-10-CM

## 2019-11-05 PROCEDURE — 99214 OFFICE O/P EST MOD 30 MIN: CPT | Performed by: NURSE PRACTITIONER

## 2019-11-05 NOTE — PROGRESS NOTES
"Brenda David is a 59 y.o. female returns for     Chief Complaint   Patient presents with   • Right Knee - Follow-up   • Results     MRI       HISTORY OF PRESENT ILLNESS: Patient presents to office for follow-up of chronic right knee pain that acutely worsened over the past several weeks and to discuss surgical treatment options with Dr. Sanchez today.  As previously noted:    Patient underwent a right knee arthroscopy with debridement of medial meniscus performed per Dr. Sanchez on 2019 and had improvement following surgery.  Right knee pain acutely worsened while she was in [place] for about one month.  While there, patient did a lot of stair climbing and walking and deep sand.  Patient was last seen in office on 10/23/2019 for her acutely worsened right knee pain.  She had an aspiration of the right knee performed and was given an intra-articular injection of steroid, which improved her pain but only for a few days.  Patient states her pain has started to return in the last 2 days.  She primarily complains of pain with walking and at nighttime when she turns over in the bed and rotate her knee.  Patient complains of locking and catching symptoms with rotation of the knee.  Patient has continued to utilize ice frequently, which does improve her pain.  Patient continues to take Meloxicam daily.  Patient continues to complain of some increased left knee pain recently with pain that radiates throughout the lower leg.  Patient states it feels like a \"nerve pain\" in her left lower leg.     CONCURRENT MEDICAL HISTORY:     Medical History        Past Medical History:   Diagnosis Date   • Anxiety     • Arthritis     • History of     • Ingrown toenail     • Knee pain, bilateral     • Migraine     • Plantar fasciitis     • Wears glasses                    Allergies   Allergen Reactions   • Bactrim [Sulfamethoxazole-Trimethoprim] Hives   • Adhesive Tape Other (See Comments)       BLISTERS   • " Amoxicillin-Pot Clavulanate Diarrhea   • Latex Rash   • Pregabalin Hives   • Sulfa Antibiotics Hives              Current Outpatient Medications on File Prior to Visit   Medication Sig   • Cholecalciferol (VITAMIN D PO) Take 2,000 Units by mouth Daily.   • DULoxetine (CYMBALTA) 60 MG capsule TAKE 1 CAPSULE BY MOUTH DAILY   • Emollient (COLLAGEN EX) Apply  topically.   • MAGNESIUM PO Take 500 mg by mouth Daily.   • meloxicam (MOBIC) 15 MG tablet TAKE 1 TABLET BY MOUTH DAILY   • Multiple Vitamin (MULTI-VITAMIN DAILY PO) Take 1 tablet by mouth Daily.   • Selenium (SELENIMIN PO) Take  by mouth.   • vitamin C (ASCORBIC ACID) 500 MG tablet Take 500 mg by mouth Daily. Time released   • [DISCONTINUED] cyclobenzaprine (FLEXERIL) 5 MG tablet Take one pill qhs prn muscle relaxation (Patient taking differently: Take one pill qhs prn muscle relaxation)   • [DISCONTINUED] Emollient (COLLAGEN EX) Apply  topically.   • [DISCONTINUED] Glucosamine HCl powder        No current facility-administered medications on file prior to visit.          Surgical History         Past Surgical History:   Procedure Laterality Date   • BACK SURGERY      • BREAST BIOPSY       •  SECTION      • HYSTERECTOMY      • KNEE ARTHROSCOPY Right 2018     Procedure: KNEE ARTHROSCOPY with debridement medial meniscus     (LATEX ALLERGY) ;  Surgeon: Carlos Manuel Sanchez MD;  Location: [facility];  Service: Orthopedics   • MTP JOINT FUSION Right 2018     Procedure: FIRST METATARSOPHALANGEAL JOINT ARTHRODESIS       (C-ARM)                  LATEX ALLERGY;  Surgeon: Kwadwo Jensen DPM;  Location: [facility];  Service:    • SINUS SURGERY                     Family History   Problem Relation Age of Onset   • Heart disease Mother     • Hypertension Mother     • Thyroid disease Mother     • Heart disease Father     • Hypertension Father     • Thyroid disease Father     • Thyroid disease Sister     • Breast cancer Paternal Grandmother  "          Social History   Social History            Socioeconomic History   • Marital status:        Spouse name: Not on file   • Number of children: Not on file   • Years of education: Not on file   • Highest education level: Not on file   Tobacco Use   • Smoking status: Former Smoker   • Smokeless tobacco: Never Used   Substance and Sexual Activity   • Alcohol use: Yes       Comment: socially   • Drug use: No   • Sexual activity: Defer          ROS    No fevers or chills.  No chest pain or shortness of air.  No GI or  disturbances.  Other than bilateral knee pain, all other systems reviewed as negative.      PHYSICAL EXAMINATION:       Ht 160 cm (63\")   LMP 07/02/2002 (Within Months)   BMI 38.79 kg/m²     Physical Exam   Constitutional: She is oriented to person, place, and time. Vital signs are normal. She appears well-developed and well-nourished. She is active and cooperative. She does not appear ill. No distress.   HENT:   Head: Normocephalic.   Cardiovascular: Regular rhythm and normal heart sounds.   Pulmonary/Chest: Effort normal and breath sounds normal. No respiratory distress.   Abdominal: Soft. She exhibits no distension.   Musculoskeletal: She exhibits edema (Mild, right knee) and tenderness (Right knee). She exhibits no deformity.        Right knee: She exhibits effusion.        Left knee: She exhibits no effusion.   Neurological: She is alert and oriented to person, place, and time. GCS eye subscore is 4. GCS verbal subscore is 5. GCS motor subscore is 6.   Skin: Skin is warm, dry and intact. Capillary refill takes less than 2 seconds. No erythema.   Psychiatric: She has a normal mood and affect. Her speech is normal and behavior is normal. Judgment and thought content normal. Cognition and memory are normal.   Vitals reviewed.      GAIT:     []  Normal  [x]  Antalgic    Assistive device: [x]  None  []  Walker     []  Crutches  []  Cane     []  Wheelchair  []  Stretcher    Right Knee " Exam     Tenderness   The patient is experiencing tenderness in the MCL and medial joint line (Diffuse, including posterior).    Range of Motion   Extension: 0   Flexion: 120     Tests   Lana:  Medial - positive Lateral - negative  Varus: negative Valgus: negative    Other   Erythema: absent  Sensation: normal  Pulse: present  Swelling: mild  Effusion: effusion present    Comments:  Mild pain and limitations with range of motion, improved from prior exam.  Pain increases at the end range of flexion and with rotation of the knee.  Mild swelling/effusion noted, improved from prior exam.  No erythema.  No warmth.  No signs of infection noted. Tenderness to palpation medially and in the posterior aspect of the knee.      Left Knee Exam     Tenderness   The patient is experiencing no tenderness.     Range of Motion   Extension: 0   Flexion: 130     Tests   Lana:  Medial - negative Lateral - positive (Mild)  Varus: negative Valgus: negative    Other   Erythema: absent  Sensation: normal  Pulse: present  Swelling: none  Effusion: no effusion present            Mri Knee Right Without Contrast    Result Date: 10/31/2019  Narrative: MRI right knee. HISTORY: Knee pain, effusion. Status post arthroscopic surgery. Prior exam: MRI right knee March 4, 2019. FINDINGS: There is an intra-articular and suprapatellar effusion. Prominent synovial plica suprapatellar recess. There is a Baker's cyst. Effusion and Baker's cyst are smaller  in comparison to prior exam. Numerous small areas of low signal intensity within the joint fluid, loose bodies or rice bodies. Normal patellar and quadriceps tendon. There is a grade II chondromalacia patellar articular hyaline cartilage. Cartilage abnormalities appear less pronounced in comparison to prior exam March 4, 2019. Degenerative osteoarthritic changes tibiofemoral joint with joint space narrowing and osteophyte production especially medial aspect. Cartilage thinning, grade III  chondromalacia distal femur and proximal tibia. Grade III chondromalacia is more pronounced in comparison prior exam. Normal anterior and posterior cruciate ligaments. Normal lateral meniscus. Irregularities posterior horn medial meniscus reflecting prior arthroscopic surgery. Similar appearance to prior exam.     Impression: CONCLUSION: Intra-articular ,suprapatellar effusion and Baker's cyst, smaller than on prior exam. Abnormal medial meniscus probably representing combination of residual tear and postsurgical changes, partial meniscectomy. Similar appearance to prior exam. Grade II chondromalacia patellar articular hyaline cartilage. Cartilage abnormalities appear less pronounced in comparison to prior exam. Degenerative osteoarthritic changes medial aspect tibiofemoral joint with cartilage thinning, grade III chondromalacia and osteophyte production. Cartilage thinning, grade III chondromalacia  is more pronounced in comparison with prior exam. Electronically signed by:  Mario Forbes MD  10/31/2019 1:13 PM CDT Workstation: [id number]        ASSESSMENT:    Diagnoses and all orders for this visit:    Status post arthroscopy of right knee    Internal derangement of right knee    Chronic pain of right knee    Chondromalacia, right knee    Effusion, right knee    Tear of medial meniscus of right knee, current, unspecified tear type, subsequent encounter    Baker's cyst of knee, right    Synovial plica of right knee    PLAN    Dr. Sanchez has examined the patient today and discussed proceeding with repeat right knee arthroscopy for possible resection of residual/new meniscal tear and synovial plica.  Recommend modified weightbearing off the right knee with use of a cane or unilateral crutch.  Continue with elevation and ice therapy as needed to minimize pain/swelling/inflammation.  Continue with meloxicam daily for consistent dosing of oral NSAIDs.  We discussed her continued increased left knee pain that she describes as  "\"nerve pain\" that shoots down her leg from the knee area.  We discussed that hopefully this pain will improve once her right knee pain improves and her increased pain may be due to compensation and increased stress on the left knee.    The patient voiced understanding of the risks, benefits, and alternative forms of treatment that were discussed and the patient consents to proceed with surgery.  All risks, benefits and alternatives were discussed.  Risks including but not exclusive to anesthetic complications, including death, MI, CVA, infection, bleeding DVT, fracture, residual pain and need for future surgery.    This discussion was held with the patient by Carlos Manuel Sanchez MD and all questions were answered.    Return for follow up postoperatively.        This document has been electronically signed by LEIDA Street on November 6, 2019 4:33 PM      LEIDA Street    "

## 2019-11-08 ENCOUNTER — PREP FOR SURGERY (OUTPATIENT)
Dept: OTHER | Facility: HOSPITAL | Age: 59
End: 2019-11-08

## 2019-11-08 DIAGNOSIS — M94.261 CHONDROMALACIA, RIGHT KNEE: ICD-10-CM

## 2019-11-08 DIAGNOSIS — M25.461 EFFUSION, RIGHT KNEE: ICD-10-CM

## 2019-11-08 DIAGNOSIS — S83.241D TEAR OF MEDIAL MENISCUS OF RIGHT KNEE, CURRENT, UNSPECIFIED TEAR TYPE, SUBSEQUENT ENCOUNTER: Primary | ICD-10-CM

## 2019-11-08 DIAGNOSIS — M23.92 INTERNAL DERANGEMENT OF LEFT KNEE: ICD-10-CM

## 2019-11-08 DIAGNOSIS — Z98.890 STATUS POST ARTHROSCOPY OF RIGHT KNEE: ICD-10-CM

## 2019-11-08 RX ORDER — BUPIVACAINE HCL/0.9 % NACL/PF 0.1 %
2 PLASTIC BAG, INJECTION (ML) EPIDURAL ONCE
Status: CANCELLED | OUTPATIENT
Start: 2019-12-02 | End: 2019-11-08

## 2019-11-12 ENCOUNTER — TELEPHONE (OUTPATIENT)
Dept: ORTHOPEDIC SURGERY | Facility: CLINIC | Age: 59
End: 2019-11-12

## 2019-11-12 NOTE — TELEPHONE ENCOUNTER
Called patient to let her know surgery has been approved.    Her phone was cutting out.  Tried to call her back and her voice mail picked up.  Will try again later.    emily

## 2019-11-26 ENCOUNTER — APPOINTMENT (OUTPATIENT)
Dept: PREADMISSION TESTING | Facility: HOSPITAL | Age: 59
End: 2019-11-26

## 2019-11-26 VITALS
RESPIRATION RATE: 16 BRPM | WEIGHT: 215.61 LBS | SYSTOLIC BLOOD PRESSURE: 143 MMHG | HEIGHT: 63 IN | DIASTOLIC BLOOD PRESSURE: 68 MMHG | BODY MASS INDEX: 38.2 KG/M2 | OXYGEN SATURATION: 94 % | HEART RATE: 71 BPM

## 2019-11-26 RX ORDER — SODIUM CHLORIDE, SODIUM GLUCONATE, SODIUM ACETATE, POTASSIUM CHLORIDE, AND MAGNESIUM CHLORIDE 526; 502; 368; 37; 30 MG/100ML; MG/100ML; MG/100ML; MG/100ML; MG/100ML
1000 INJECTION, SOLUTION INTRAVENOUS CONTINUOUS
Status: CANCELLED | OUTPATIENT
Start: 2019-12-02

## 2019-11-26 RX ORDER — MELOXICAM 15 MG/1
15 TABLET ORAL DAILY
COMMUNITY
End: 2019-12-23

## 2019-12-01 ENCOUNTER — ANESTHESIA EVENT (OUTPATIENT)
Dept: PERIOP | Facility: HOSPITAL | Age: 59
End: 2019-12-01

## 2019-12-02 ENCOUNTER — HOSPITAL ENCOUNTER (OUTPATIENT)
Facility: HOSPITAL | Age: 59
Setting detail: HOSPITAL OUTPATIENT SURGERY
Discharge: HOME | End: 2019-12-02
Attending: ORTHOPAEDIC SURGERY | Admitting: ORTHOPAEDIC SURGERY

## 2019-12-02 ENCOUNTER — ANESTHESIA (OUTPATIENT)
Dept: PERIOP | Facility: HOSPITAL | Age: 59
End: 2019-12-02

## 2019-12-02 VITALS
BODY MASS INDEX: 38.24 KG/M2 | DIASTOLIC BLOOD PRESSURE: 65 MMHG | OXYGEN SATURATION: 94 % | HEIGHT: 63 IN | TEMPERATURE: 97.6 F | SYSTOLIC BLOOD PRESSURE: 121 MMHG | RESPIRATION RATE: 16 BRPM | WEIGHT: 215.83 LBS | HEART RATE: 90 BPM

## 2019-12-02 DIAGNOSIS — M23.91 INTERNAL DERANGEMENT OF RIGHT KNEE: Primary | ICD-10-CM

## 2019-12-02 DIAGNOSIS — M23.92 INTERNAL DERANGEMENT OF LEFT KNEE: ICD-10-CM

## 2019-12-02 DIAGNOSIS — M25.461 EFFUSION, RIGHT KNEE: ICD-10-CM

## 2019-12-02 DIAGNOSIS — S83.241D TEAR OF MEDIAL MENISCUS OF RIGHT KNEE, CURRENT, UNSPECIFIED TEAR TYPE, SUBSEQUENT ENCOUNTER: ICD-10-CM

## 2019-12-02 DIAGNOSIS — Z98.890 STATUS POST ARTHROSCOPY OF RIGHT KNEE: ICD-10-CM

## 2019-12-02 PROCEDURE — 25010000002 PROPOFOL 10 MG/ML EMULSION: Performed by: NURSE ANESTHETIST, CERTIFIED REGISTERED

## 2019-12-02 PROCEDURE — 25010000002 FENTANYL CITRATE (PF) 100 MCG/2ML SOLUTION: Performed by: NURSE ANESTHETIST, CERTIFIED REGISTERED

## 2019-12-02 PROCEDURE — 25010000002 MIDAZOLAM PER 1 MG: Performed by: NURSE ANESTHETIST, CERTIFIED REGISTERED

## 2019-12-02 PROCEDURE — 25010000002 HYDROMORPHONE 1 MG/ML SOLUTION: Performed by: NURSE ANESTHETIST, CERTIFIED REGISTERED

## 2019-12-02 PROCEDURE — 25010000002 SUCCINYLCHOLINE PER 20 MG: Performed by: NURSE ANESTHETIST, CERTIFIED REGISTERED

## 2019-12-02 PROCEDURE — 25010000002 DEXAMETHASONE PER 1 MG: Performed by: NURSE ANESTHETIST, CERTIFIED REGISTERED

## 2019-12-02 PROCEDURE — 29881 ARTHRS KNE SRG MNISECTMY M/L: CPT | Performed by: ORTHOPAEDIC SURGERY

## 2019-12-02 PROCEDURE — 25010000002 MORPHINE PER 10 MG: Performed by: ORTHOPAEDIC SURGERY

## 2019-12-02 RX ORDER — HYDROCODONE BITARTRATE AND ACETAMINOPHEN 7.5; 325 MG/1; MG/1
1 TABLET ORAL ONCE AS NEEDED
Status: DISCONTINUED | OUTPATIENT
Start: 2019-12-02 | End: 2019-12-02 | Stop reason: HOSPADM

## 2019-12-02 RX ORDER — PROMETHAZINE HYDROCHLORIDE 25 MG/1
25 SUPPOSITORY RECTAL ONCE AS NEEDED
Status: DISCONTINUED | OUTPATIENT
Start: 2019-12-02 | End: 2019-12-02 | Stop reason: HOSPADM

## 2019-12-02 RX ORDER — ONDANSETRON 2 MG/ML
4 INJECTION INTRAMUSCULAR; INTRAVENOUS ONCE AS NEEDED
Status: DISCONTINUED | OUTPATIENT
Start: 2019-12-02 | End: 2019-12-02 | Stop reason: HOSPADM

## 2019-12-02 RX ORDER — FENTANYL CITRATE 50 UG/ML
INJECTION, SOLUTION INTRAMUSCULAR; INTRAVENOUS AS NEEDED
Status: DISCONTINUED | OUTPATIENT
Start: 2019-12-02 | End: 2019-12-02 | Stop reason: SURG

## 2019-12-02 RX ORDER — ACETAMINOPHEN 325 MG/1
650 TABLET ORAL ONCE AS NEEDED
Status: DISCONTINUED | OUTPATIENT
Start: 2019-12-02 | End: 2019-12-02 | Stop reason: HOSPADM

## 2019-12-02 RX ORDER — BUPIVACAINE HYDROCHLORIDE AND EPINEPHRINE 2.5; 5 MG/ML; UG/ML
INJECTION, SOLUTION EPIDURAL; INFILTRATION; INTRACAUDAL; PERINEURAL AS NEEDED
Status: DISCONTINUED | OUTPATIENT
Start: 2019-12-02 | End: 2019-12-02 | Stop reason: HOSPADM

## 2019-12-02 RX ORDER — PROMETHAZINE HYDROCHLORIDE 25 MG/1
25 TABLET ORAL ONCE AS NEEDED
Status: DISCONTINUED | OUTPATIENT
Start: 2019-12-02 | End: 2019-12-02 | Stop reason: HOSPADM

## 2019-12-02 RX ORDER — PROMETHAZINE HYDROCHLORIDE 25 MG/ML
12.5 INJECTION, SOLUTION INTRAMUSCULAR; INTRAVENOUS ONCE AS NEEDED
Status: DISCONTINUED | OUTPATIENT
Start: 2019-12-02 | End: 2019-12-02 | Stop reason: HOSPADM

## 2019-12-02 RX ORDER — LABETALOL HYDROCHLORIDE 5 MG/ML
5 INJECTION, SOLUTION INTRAVENOUS
Status: DISCONTINUED | OUTPATIENT
Start: 2019-12-02 | End: 2019-12-02 | Stop reason: HOSPADM

## 2019-12-02 RX ORDER — SODIUM CHLORIDE, SODIUM GLUCONATE, SODIUM ACETATE, POTASSIUM CHLORIDE, AND MAGNESIUM CHLORIDE 526; 502; 368; 37; 30 MG/100ML; MG/100ML; MG/100ML; MG/100ML; MG/100ML
1000 INJECTION, SOLUTION INTRAVENOUS CONTINUOUS
Status: DISCONTINUED | OUTPATIENT
Start: 2019-12-02 | End: 2019-12-02 | Stop reason: HOSPADM

## 2019-12-02 RX ORDER — NALOXONE HCL 0.4 MG/ML
0.4 VIAL (ML) INJECTION AS NEEDED
Status: DISCONTINUED | OUTPATIENT
Start: 2019-12-02 | End: 2019-12-02 | Stop reason: HOSPADM

## 2019-12-02 RX ORDER — ACETAMINOPHEN 650 MG/1
650 SUPPOSITORY RECTAL ONCE AS NEEDED
Status: DISCONTINUED | OUTPATIENT
Start: 2019-12-02 | End: 2019-12-02 | Stop reason: HOSPADM

## 2019-12-02 RX ORDER — EPHEDRINE SULFATE 50 MG/ML
5 INJECTION, SOLUTION INTRAVENOUS ONCE AS NEEDED
Status: DISCONTINUED | OUTPATIENT
Start: 2019-12-02 | End: 2019-12-02 | Stop reason: HOSPADM

## 2019-12-02 RX ORDER — FLUMAZENIL 0.1 MG/ML
0.2 INJECTION INTRAVENOUS AS NEEDED
Status: DISCONTINUED | OUTPATIENT
Start: 2019-12-02 | End: 2019-12-02 | Stop reason: HOSPADM

## 2019-12-02 RX ORDER — SODIUM CHLORIDE 9 MG/ML
INJECTION, SOLUTION INTRAVENOUS CONTINUOUS PRN
Status: DISCONTINUED | OUTPATIENT
Start: 2019-12-02 | End: 2019-12-02 | Stop reason: SURG

## 2019-12-02 RX ORDER — SUCCINYLCHOLINE CHLORIDE 20 MG/ML
INJECTION INTRAMUSCULAR; INTRAVENOUS AS NEEDED
Status: DISCONTINUED | OUTPATIENT
Start: 2019-12-02 | End: 2019-12-02 | Stop reason: SURG

## 2019-12-02 RX ORDER — DEXAMETHASONE SODIUM PHOSPHATE 4 MG/ML
INJECTION, SOLUTION INTRA-ARTICULAR; INTRALESIONAL; INTRAMUSCULAR; INTRAVENOUS; SOFT TISSUE AS NEEDED
Status: DISCONTINUED | OUTPATIENT
Start: 2019-12-02 | End: 2019-12-02 | Stop reason: SURG

## 2019-12-02 RX ORDER — MIDAZOLAM HYDROCHLORIDE 1 MG/ML
INJECTION INTRAMUSCULAR; INTRAVENOUS AS NEEDED
Status: DISCONTINUED | OUTPATIENT
Start: 2019-12-02 | End: 2019-12-02 | Stop reason: SURG

## 2019-12-02 RX ORDER — PROPOFOL 10 MG/ML
VIAL (ML) INTRAVENOUS AS NEEDED
Status: DISCONTINUED | OUTPATIENT
Start: 2019-12-02 | End: 2019-12-02 | Stop reason: SURG

## 2019-12-02 RX ORDER — BUPIVACAINE HCL/0.9 % NACL/PF 0.1 %
2 PLASTIC BAG, INJECTION (ML) EPIDURAL ONCE
Status: COMPLETED | OUTPATIENT
Start: 2019-12-02 | End: 2019-12-02

## 2019-12-02 RX ORDER — LIDOCAINE HYDROCHLORIDE 20 MG/ML
INJECTION, SOLUTION INFILTRATION; PERINEURAL AS NEEDED
Status: DISCONTINUED | OUTPATIENT
Start: 2019-12-02 | End: 2019-12-02 | Stop reason: SURG

## 2019-12-02 RX ORDER — HYDROCODONE BITARTRATE AND ACETAMINOPHEN 7.5; 325 MG/1; MG/1
1 TABLET ORAL EVERY 4 HOURS PRN
Qty: 30 TABLET | Refills: 0 | Status: SHIPPED | OUTPATIENT
Start: 2019-12-02 | End: 2019-12-23

## 2019-12-02 RX ORDER — MORPHINE SULFATE 10 MG/ML
INJECTION, SOLUTION INTRAMUSCULAR; INTRAVENOUS AS NEEDED
Status: DISCONTINUED | OUTPATIENT
Start: 2019-12-02 | End: 2019-12-02 | Stop reason: HOSPADM

## 2019-12-02 RX ORDER — DIPHENHYDRAMINE HYDROCHLORIDE 50 MG/ML
12.5 INJECTION INTRAMUSCULAR; INTRAVENOUS
Status: DISCONTINUED | OUTPATIENT
Start: 2019-12-02 | End: 2019-12-02 | Stop reason: HOSPADM

## 2019-12-02 RX ADMIN — Medication 2 G: at 08:55

## 2019-12-02 RX ADMIN — GLYCOPYRROLATE 0.2 MG: 0.2 INJECTION, SOLUTION INTRAMUSCULAR; INTRAVITREAL at 09:17

## 2019-12-02 RX ADMIN — HYDROMORPHONE HYDROCHLORIDE 0.5 MG: 1 INJECTION, SOLUTION INTRAMUSCULAR; INTRAVENOUS; SUBCUTANEOUS at 10:08

## 2019-12-02 RX ADMIN — FENTANYL CITRATE 50 MCG: 50 INJECTION, SOLUTION INTRAMUSCULAR; INTRAVENOUS at 09:09

## 2019-12-02 RX ADMIN — HYDROMORPHONE HYDROCHLORIDE 0.5 MG: 1 INJECTION, SOLUTION INTRAMUSCULAR; INTRAVENOUS; SUBCUTANEOUS at 09:44

## 2019-12-02 RX ADMIN — FENTANYL CITRATE 50 MCG: 50 INJECTION, SOLUTION INTRAMUSCULAR; INTRAVENOUS at 08:54

## 2019-12-02 RX ADMIN — MIDAZOLAM HYDROCHLORIDE 2 MG: 2 INJECTION, SOLUTION INTRAMUSCULAR; INTRAVENOUS at 08:38

## 2019-12-02 RX ADMIN — HYDROCODONE BITARTRATE AND ACETAMINOPHEN 1 TABLET: 7.5; 325 TABLET ORAL at 10:37

## 2019-12-02 RX ADMIN — DEXAMETHASONE SODIUM PHOSPHATE 4 MG: 4 INJECTION, SOLUTION INTRAMUSCULAR; INTRAVENOUS at 08:55

## 2019-12-02 RX ADMIN — FENTANYL CITRATE 50 MCG: 50 INJECTION, SOLUTION INTRAMUSCULAR; INTRAVENOUS at 09:11

## 2019-12-02 RX ADMIN — FENTANYL CITRATE 50 MCG: 50 INJECTION, SOLUTION INTRAMUSCULAR; INTRAVENOUS at 08:51

## 2019-12-02 RX ADMIN — PROPOFOL 150 MG: 10 INJECTION, EMULSION INTRAVENOUS at 08:43

## 2019-12-02 RX ADMIN — SODIUM CHLORIDE: 900 INJECTION, SOLUTION INTRAVENOUS at 08:38

## 2019-12-02 RX ADMIN — SUCCINYLCHOLINE CHLORIDE 100 MG: 20 INJECTION, SOLUTION INTRAMUSCULAR; INTRAVENOUS at 09:21

## 2019-12-02 RX ADMIN — SODIUM CHLORIDE, SODIUM GLUCONATE, SODIUM ACETATE, POTASSIUM CHLORIDE, AND MAGNESIUM CHLORIDE 1000 ML: 526; 502; 368; 37; 30 INJECTION, SOLUTION INTRAVENOUS at 08:32

## 2019-12-02 RX ADMIN — LIDOCAINE HYDROCHLORIDE 70 MG: 20 INJECTION, SOLUTION INFILTRATION; PERINEURAL at 08:43

## 2019-12-02 NOTE — ANESTHESIA POSTPROCEDURE EVALUATION
Patient: Brenda David    Procedure Summary     Date:  12/02/19 Room / Location:  [facility] OR 09 / [facility] OR    Anesthesia Start:  0839 Anesthesia Stop:  0942    Procedure:  KNEE ARTHROSCOPY with debridement medial meniscus           (LATEX ALLERGY) (Right Knee) Diagnosis:       Status post arthroscopy of right knee      Effusion, right knee      Tear of medial meniscus of right knee, current, unspecified tear type, subsequent encounter      Internal derangement of left knee      (Status post arthroscopy of right knee [Z98.890])      (Effusion, right knee [M25.461])      (Tear of medial meniscus of right knee, current, unspecified tear type, subsequent encounter [S83.241D])      (Internal derangement of left knee [M23.92])    Surgeon:  Carlos Manuel Sanchez MD Provider:  Arya Parikh MD    Anesthesia Type:  general ASA Status:  3          Anesthesia Type: general  Last vitals  BP   120/66 (12/02/19 0836)   Temp   97.6 °F (36.4 °C) (12/02/19 0836)   Pulse   74 (12/02/19 0836)   Resp   17 (12/02/19 0836)     SpO2   97 % (12/02/19 0836)     Post Anesthesia Care and Evaluation    Patient location during evaluation: PACU  Patient participation: complete - patient participated  Level of consciousness: awake and alert  Pain score: 2  Pain management: adequate  Airway patency: patent  Anesthetic complications: No anesthetic complications  PONV Status: none  Cardiovascular status: acceptable  Respiratory status: acceptable, spontaneous ventilation, room air and unassisted  Hydration status: acceptable

## 2019-12-02 NOTE — ANESTHESIA PROCEDURE NOTES
Airway  Urgency: elective    Date/Time: 12/2/2019 8:45 AM  Airway not difficult    General Information and Staff    Patient location during procedure: OR  CRNA: Dave Paulino CRNA    Indications and Patient Condition  Indications for airway management: airway protection    Preoxygenated: yes  MILS maintained throughout      Final Airway Details  Final airway type: endotracheal airway      Successful airway: ETT    Successful intubation technique: video laryngoscopy  Facilitating devices/methods: Bougie  Endotracheal tube insertion site: oral  Blade: Aponte  Blade size: 3  ETT size (mm): 7.5  Cormack-Lehane Classification: grade I - full view of glottis  Placement verified by: chest auscultation and capnometry   Measured from: teeth  ETT/EBT  to teeth (cm): 21  Number of attempts at approach: 1  Assessment: lips, teeth, and gum same as pre-op and atraumatic intubation

## 2019-12-02 NOTE — ANESTHESIA PREPROCEDURE EVALUATION
Anesthesia Evaluation     no history of anesthetic complications:  NPO Solid Status: > 8 hours  NPO Liquid Status: > 2 hours           Airway   Mallampati: II  TM distance: >3 FB  Neck ROM: full  Possible difficult intubation  Dental - normal exam     Pulmonary - normal exam    breath sounds clear to auscultation  (-) COPD, asthma, sleep apnea, not a smoker  Cardiovascular - normal exam  Exercise tolerance: good (4-7 METS)    Rhythm: regular  Rate: normal    (-) hypertension, valvular problems/murmurs, dysrhythmias, angina, cardiac stents, DVT, hyperlipidemia      Neuro/Psych  (+) headaches (migraines controlled), numbness (maris feet neuropathy), psychiatric history Anxiety and Depression,     (-) seizures, TIA, CVA  GI/Hepatic/Renal/Endo    (+)  GERD well controlled,    (-) hepatitis, liver disease, no renal disease, diabetes, no thyroid disorder    Musculoskeletal     (+) back pain, chronic pain,   Abdominal   (+) obese,    Substance History   (-) alcohol use, drug use     OB/GYN          Other   arthritis (foot, back, knees),      (-) history of cancer                  Anesthesia Plan    ASA 3     general     intravenous induction     Anesthetic plan, all risks, benefits, and alternatives have been provided, discussed and informed consent has been obtained with: patient and child.

## 2019-12-02 NOTE — ANESTHESIA PROCEDURE NOTES
Airway  Date/Time: 12/2/2019 8:45 AM  Airway not difficult    General Information and Staff    Patient location during procedure: OR  CRNA: Dave Paulino CRNA    Indications and Patient Condition  Indications for airway management: airway protection    Preoxygenated: yes  Mask difficulty assessment: 0 - not attempted    Final Airway Details  Final airway type: supraglottic airway      Successful airway: I-gel  Size 4    Number of attempts at approach: 1  Assessment: lips, teeth, and gum same as pre-op and atraumatic intubation

## 2019-12-02 NOTE — INTERVAL H&P NOTE
H&P reviewed. The patient was examined and there are no changes to the H&P.     The patient voiced understanding of the risks, benefits, and alternative forms of treatment that were discussed and the patient consents to proceed with surgery.  All risks, benefits and alternatives were discussed.  Risks including but not exclusive to anesthetic complications, including death, MI, CVA, infection, bleeding DVT, fracture, residual pain and need for future surgery.    This discussion was held with the patient by Carlos Manuel Sanchez MD and all questions were answered.    12/02/19 at 8:22 AM by Carlos Manuel Sanchez MD

## 2019-12-02 NOTE — OP NOTE
NAME: Brenda David     YOB: 1960    DATE OF SURGERY: 12/2/2019    PREOPERATIVE DIAGNOSIS:  Status post arthroscopy of right knee [Z98.890]  Effusion, right knee [M25.461]  Tear of medial meniscus of right knee, current, unspecified tear type, subsequent encounter [S83.241D]  Internal derangement of left knee [M23.92]    POSTOPERATIVE DIAGNOSIS:  Post-Op Diagnosis Codes:     * Status post arthroscopy of right knee [Z98.890]     * Effusion, right knee [M25.461]     * Tear of medial meniscus of right knee, current, unspecified tear type, subsequent encounter [S83.241D]     * Internal derangement of left knee [M23.92]    PROCEDURE PERFORMED:  Procedure(s) (LRB):  KNEE ARTHROSCOPY with debridement medial meniscus (Right)      SURGEON:  Carlos Manuel Sanchez MD    Assistant:  BOYD Munoz    Staff:    Circulator: Jyoti Gibson RN  Scrub Person: Laura Barfield  Assistant: Kimberly Cummins CSA    Anesthesia:  General    Estimated Blood Loss: minimal    Specimens : None    Complications: none    DESCRIPTION OF PROCEDURE: The patient was taken to the operating room and placed in the supine position. After adequate induction of general anesthesia the leg was prepped and draped in the usual sterile fashion exsanguinated with an Ace bandage and the tourniquet inflated to 250 mmHg.  The leg was placed in a well-padded leg keyes. An anterior lateral portal site was then created and the scope was introduced.  The knee was examined in sequential fashion.       The scope was then introduced into the medial joint.  Medial joint findings included articular cartilage changes along the medial femoral condyle as well as the medial tibial plateau.      At this point the anteromedial portal was created with spinal needle for localization.  The probe was utilized to identify the extent of the tearing in the posterior horn.  There was some degenerative tearing in the posterior horn of the meniscus but no  flap tears or large oblique tear is present.  The remainder of the posterior horn was debrided to a smooth stable base.  Shaver was utilized to remove any impending articular cartilage loose fragments.  There are couple of small areas of bare bone along the far medial aspect of the joint space.    The ACL was then examined and noted to be normal.    The lateral joint was examined and found to be intact with no articular cartilage changes and no lateral meniscus findings.  The patellofemoral joint was examined and found to have some articular cartilage change in the groove itself and some mild changes on the undersurface of the patella.  The probe was utilized to identify some impending loose articular cartilage fragments and these were debrided with the suction shaver to smooth out the border of the articular cartilage.  The shaver was then advanced into the suprapatellar pouch where it was allowed to run on suction to remove any remaining small loose fragments.    The knee was then expressed of excess fluid, the portal sites were closed.  Sterile dressings were then applied the patient was awakened and transferred to the recovery room.  At the end of the case the sponge and needle counts were reported as being correct and there were no known complications.      Findings: As above          Carlos Manuel Sanchez MD     Date: 12/2/2019  Time: 9:50 AM

## 2019-12-16 ENCOUNTER — OFFICE VISIT (OUTPATIENT)
Dept: ORTHOPEDIC SURGERY | Facility: CLINIC | Age: 59
End: 2019-12-16

## 2019-12-16 VITALS — BODY MASS INDEX: 38.09 KG/M2 | HEIGHT: 63 IN | WEIGHT: 215 LBS

## 2019-12-16 DIAGNOSIS — M23.91 INTERNAL DERANGEMENT OF RIGHT KNEE: ICD-10-CM

## 2019-12-16 DIAGNOSIS — Z98.890 STATUS POST ARTHROSCOPY OF RIGHT KNEE: Primary | ICD-10-CM

## 2019-12-16 DIAGNOSIS — S83.241D TEAR OF MEDIAL MENISCUS OF RIGHT KNEE, CURRENT, UNSPECIFIED TEAR TYPE, SUBSEQUENT ENCOUNTER: ICD-10-CM

## 2019-12-16 DIAGNOSIS — G89.29 CHRONIC PAIN OF RIGHT KNEE: ICD-10-CM

## 2019-12-16 DIAGNOSIS — M71.21 BAKER'S CYST OF KNEE, RIGHT: ICD-10-CM

## 2019-12-16 DIAGNOSIS — M94.261 CHONDROMALACIA, RIGHT KNEE: ICD-10-CM

## 2019-12-16 DIAGNOSIS — M25.561 CHRONIC PAIN OF RIGHT KNEE: ICD-10-CM

## 2019-12-16 PROCEDURE — 99024 POSTOP FOLLOW-UP VISIT: CPT | Performed by: NURSE PRACTITIONER

## 2019-12-16 NOTE — PROGRESS NOTES
Brenda David is a 59 y.o. female is s/p       Chief Complaint   Patient presents with   • Right Knee - Post-op       HISTORY OF PRESENT ILLNESS: Patient presents to office for postoperative follow-up status post right knee arthroscopy with debridement of medial meniscus performed per Dr. Sanchez on 12/2/2019.  Patient is doing well postoperatively and states her symptoms are significantly improved.  She denies any knee pain today.  Patient reports some mild right knee swelling that has continued to gradually improve.  No new complaints or concerns noted.  Sutures are removed today.    Allergies   Allergen Reactions   • Bactrim [Sulfamethoxazole-Trimethoprim] Hives   • Adhesive Tape Other (See Comments)     BLISTERS   • Amoxicillin-Pot Clavulanate Diarrhea   • Latex Rash   • Pregabalin Hives   • Sulfa Antibiotics Hives         Current Outpatient Medications:   •  CBD (cannabidiol) oral oil, Take  by mouth Daily., Disp: , Rfl:   •  Cholecalciferol (VITAMIN D PO), Take 2,000 Units by mouth Daily., Disp: , Rfl:   •  DULoxetine (CYMBALTA) 60 MG capsule, Take 1 capsule by mouth Daily., Disp: 90 capsule, Rfl: 1  •  melatonin 1 MG tablet, Take  by mouth At Night As Needed for Sleep., Disp: , Rfl:   •  Multiple Vitamin (MULTI-VITAMIN DAILY PO), Take 1 tablet by mouth Daily., Disp: , Rfl:   •  Selenium (SELENIMIN PO), Take 1 tablet by mouth Daily., Disp: , Rfl:   •  vitamin C (ASCORBIC ACID) 500 MG tablet, Take 500 mg by mouth Daily. Time released, Disp: , Rfl:   •  HYDROcodone-acetaminophen (NORCO) 7.5-325 MG per tablet, Take 1 tablet by mouth Every 4 (Four) Hours As Needed for Moderate Pain., Disp: 30 tablet, Rfl: 0  •  meloxicam (MOBIC) 15 MG tablet, Take 15 mg by mouth Daily., Disp: , Rfl:     No fevers or chills.  No nausea or vomiting.      PHYSICAL EXAMINATION:       Brenda David is a 59 y.o. female    Patient is awake and alert, answers questions appropriately and is in no apparent distress.    GAIT:   "   []  Normal  [x]  Antalgic (mild)    Assistive device: [x]  None  []  Walker     []  Crutches  []  Cane     []  Wheelchair  []  Stretcher    Right Knee Exam     Tenderness   The patient is experiencing no tenderness.     Range of Motion   Extension: 0   Flexion: 120     Other   Erythema: absent  Sensation: normal  Pulse: present  Swelling: mild  Effusion: no effusion present    Comments:  Mild pain and limitations with range of motion.  Overall good motion of the knee joint.  Mild, generalized swelling noted.  No erythema.  No warmth.  Surgical incisions are well approximated.  No drainage noted.  No signs of infection noted.  Calf is soft and nontender.  Homans sign is negative.              ASSESSMENT:    Diagnoses and all orders for this visit:    Status post arthroscopy of right knee    Internal derangement of right knee    Chronic pain of right knee    Chondromalacia, right knee    Tear of medial meniscus of right knee, current, unspecified tear type, subsequent encounter    Baker's cyst of knee, right    PLAN    Patient is doing well postoperatively with significantly improved right knee pain/symptoms since surgery.  Patient denies any knee pain today.  She states she is doing \"great\" and is pleased with her outcome.  Surgical incisions are healing as expected.  Sutures are removed today and Steri-Strips placed.  Wound care instructions reviewed, including care of Steri-Strips.  Patient is instructed to continue to monitor the incisions for any signs of infection including redness, increased tenderness, increased swelling, warmth and/or purulent drainage.  Patient is instructed to notify the office immediately if any such signs of infection are noted.  Continue to progress activity as tolerated.  Continue with home exercise program for range of motion, conditioning and strengthening of the right knee.  Continue with elevation and ice therapy to the right knee as needed to minimize pain/swelling/inflammation. "  Recommend Tylenol as needed for pain.  Continue Meloxicam daily, as previously prescribed for management of joint pain/swelling/inflammation.  Follow-up in 4 weeks for recheck.    Return in about 4 weeks (around 1/13/2020) for Recheck.        This document has been electronically signed by LEIDA Street on December 16, 2019 9:46 PM      LEIDA Street

## 2020-02-03 PROBLEM — T78.40XA ALLERGY: Status: ACTIVE | Noted: 2020-02-03

## 2020-02-03 PROBLEM — M79.7 FIBROMYALGIA: Status: ACTIVE | Noted: 2020-02-03

## 2020-02-03 PROBLEM — F41.9 ANXIETY: Status: ACTIVE | Noted: 2020-02-03

## 2020-02-03 PROBLEM — F32.A DEPRESSION: Status: ACTIVE | Noted: 2020-02-03

## 2020-02-03 PROBLEM — M71.9 BURSITIS: Status: ACTIVE | Noted: 2020-02-03

## 2020-02-03 RX ORDER — DULOXETIN HYDROCHLORIDE 60 MG/1
60 CAPSULE, DELAYED RELEASE ORAL DAILY
Qty: 90 CAPSULE | Refills: 1 | Status: SHIPPED | OUTPATIENT
Start: 2020-02-03 | End: 2020-07-23

## 2020-07-23 RX ORDER — DULOXETIN HYDROCHLORIDE 60 MG/1
60 CAPSULE, DELAYED RELEASE ORAL DAILY
Qty: 90 CAPSULE | Refills: 1 | Status: SHIPPED | OUTPATIENT
Start: 2020-07-23 | End: 2021-01-15 | Stop reason: SDUPTHER

## 2021-01-15 ENCOUNTER — TELEMEDICINE (OUTPATIENT)
Dept: FAMILY MEDICINE CLINIC | Facility: CLINIC | Age: 61
End: 2021-01-15

## 2021-01-15 VITALS — RESPIRATION RATE: 22 BRPM | HEART RATE: 72 BPM

## 2021-01-15 DIAGNOSIS — M79.2 NEUROPATHIC PAIN: Primary | ICD-10-CM

## 2021-01-15 DIAGNOSIS — M79.7 FIBROMYALGIA: ICD-10-CM

## 2021-01-15 PROCEDURE — 99214 OFFICE O/P EST MOD 30 MIN: CPT | Performed by: FAMILY MEDICINE

## 2021-01-15 RX ORDER — DULOXETIN HYDROCHLORIDE 60 MG/1
60 CAPSULE, DELAYED RELEASE ORAL DAILY
Qty: 90 CAPSULE | Refills: 1 | Status: SHIPPED | OUTPATIENT
Start: 2021-01-15 | End: 2021-06-01 | Stop reason: SDUPTHER

## 2021-01-15 NOTE — PROGRESS NOTES
You have chosen to receive care through a telehealth visit.  Do you consent to use a video/audio connection for your medical care today? Yes    Subjective   Brenda David is a 60 y.o. female.     HPI  The patient has a history of Neuropathy and Fibromyalgia. The trigger points are apt to be set off by pressing on her Trigger Points.  She currently has shingles.Her Dermatologist evaluated   The following portions of the patient's history were reviewed and updated as appropriate: allergies, current medications, past family history, past medical history, past social history, past surgical history and problem list.    Review of Systems    Objective   Physical Exam  Vitals signs reviewed.   Constitutional:       Appearance: Normal appearance.   HENT:      Head: Normocephalic and atraumatic.      Right Ear: External ear normal.      Left Ear: External ear normal.      Nose: Nose normal.      Mouth/Throat:      Mouth: Mucous membranes are moist.      Pharynx: Oropharynx is clear.   Eyes:      Extraocular Movements: Extraocular movements intact.      Pupils: Pupils are equal, round, and reactive to light.   Neck:      Musculoskeletal: Normal range of motion and neck supple.   Cardiovascular:      Rate and Rhythm: Normal rate and regular rhythm.      Heart sounds: Normal heart sounds. No murmur. No friction rub. No gallop.    Pulmonary:      Effort: Pulmonary effort is normal. No respiratory distress.      Breath sounds: Normal breath sounds. No stridor.   Abdominal:      General: There is no distension.      Palpations: Abdomen is soft.      Tenderness: There is no abdominal tenderness.   Skin:     General: Skin is warm and dry.   Neurological:      Mental Status: She is alert.           Visit Vitals  Pulse 72   Resp 22   LMP 07/02/2002 (Within Months)     There is no height or weight on file to calculate BMI.      Assessment/Plan   Diagnoses and all orders for this visit:    1. Neuropathic pain (Primary)    2.  Fibromyalgia    Other orders  -     DULoxetine (CYMBALTA) 60 MG capsule; Take 1 capsule by mouth Daily.  Dispense: 90 capsule; Refill: 1    Return to the clinic in 6 month/s.  Will contact with results as needed.  This was an audio-video enabled telemedicine encounter-time spent was   15 minutes.

## 2021-01-22 ENCOUNTER — TELEPHONE (OUTPATIENT)
Dept: FAMILY MEDICINE CLINIC | Facility: CLINIC | Age: 61
End: 2021-01-22

## 2021-01-22 DIAGNOSIS — B02.9 HERPES ZOSTER WITHOUT COMPLICATION: ICD-10-CM

## 2021-01-22 DIAGNOSIS — R52 PAIN: Primary | ICD-10-CM

## 2021-01-22 RX ORDER — HYDROCODONE BITARTRATE AND ACETAMINOPHEN 7.5; 325 MG/1; MG/1
1 TABLET ORAL 4 TIMES DAILY PRN
Qty: 28 TABLET | Refills: 0 | OUTPATIENT
Start: 2021-01-22 | End: 2021-08-02

## 2021-01-22 NOTE — TELEPHONE ENCOUNTER
PATIENT CALLED AND STATED THAT SHE WOULD LIKE TO SPEAK TO PROVIDER BEFORE CLOSING TODAY.    ORIGINAL BREAKOUT DATE: 01/15    PATIENT #: [phone]

## 2021-01-22 NOTE — TELEPHONE ENCOUNTER
PATIENT IS CALLING IN TO REQUESTING MEDICATION TO BE CALLED IN FOR HER SHINGLES. DERMATOLOGIST PRESCRIBED AN ANTI-VIRAL AND A CREAM. NEEDING A MEDICATION FOR THE PAIN. STATES THAT DERMATOLOGIST ADVISED THAT SHE WOULD HAVE TO CALL PCP.    PLEASE CONTACT AND ADVISE     [id number] - [place] [id number]  AT [facility] [address] - [phone] [facility] [phone]   [phone]

## 2021-06-01 RX ORDER — DULOXETIN HYDROCHLORIDE 60 MG/1
60 CAPSULE, DELAYED RELEASE ORAL DAILY
Qty: 90 CAPSULE | Refills: 2 | Status: SHIPPED | OUTPATIENT
Start: 2021-06-01 | End: 2021-11-23 | Stop reason: SDUPTHER

## 2021-06-01 NOTE — TELEPHONE ENCOUNTER
[facility] called needing a refill on duloxetine 60mg.

## 2021-08-02 ENCOUNTER — HOSPITAL ENCOUNTER (INPATIENT)
Facility: HOSPITAL | Age: 61
LOS: 3 days | Discharge: HOME | End: 2021-08-06
Attending: STUDENT IN AN ORGANIZED HEALTH CARE EDUCATION/TRAINING PROGRAM | Admitting: FAMILY MEDICINE

## 2021-08-02 ENCOUNTER — APPOINTMENT (OUTPATIENT)
Dept: CT IMAGING | Facility: HOSPITAL | Age: 61
End: 2021-08-02

## 2021-08-02 ENCOUNTER — APPOINTMENT (OUTPATIENT)
Dept: GENERAL RADIOLOGY | Facility: HOSPITAL | Age: 61
End: 2021-08-02

## 2021-08-02 DIAGNOSIS — R51.9 NONINTRACTABLE HEADACHE, UNSPECIFIED CHRONICITY PATTERN, UNSPECIFIED HEADACHE TYPE: ICD-10-CM

## 2021-08-02 DIAGNOSIS — J12.82 PNEUMONIA DUE TO COVID-19 VIRUS: Primary | ICD-10-CM

## 2021-08-02 DIAGNOSIS — U07.1 PNEUMONIA DUE TO COVID-19 VIRUS: Primary | ICD-10-CM

## 2021-08-02 LAB
ALBUMIN SERPL-MCNC: 3.9 G/DL (ref 3.5–5.2)
ALBUMIN SERPL-MCNC: 4 G/DL (ref 3.5–5.2)
ALBUMIN/GLOB SERPL: 1.1 G/DL
ALP SERPL-CCNC: 84 U/L (ref 39–117)
ALP SERPL-CCNC: 86 U/L (ref 39–117)
ALT SERPL W P-5'-P-CCNC: 24 U/L (ref 1–33)
ALT SERPL W P-5'-P-CCNC: 25 U/L (ref 1–33)
ANION GAP SERPL CALCULATED.3IONS-SCNC: 10 MMOL/L (ref 5–15)
AST SERPL-CCNC: 31 U/L (ref 1–32)
AST SERPL-CCNC: 34 U/L (ref 1–32)
BASOPHILS # BLD AUTO: 0.04 10*3/MM3 (ref 0–0.2)
BASOPHILS NFR BLD AUTO: 0.5 % (ref 0–1.5)
BILIRUB CONJ SERPL-MCNC: <0.2 MG/DL (ref 0–0.3)
BILIRUB INDIRECT SERPL-MCNC: ABNORMAL MG/DL
BILIRUB SERPL-MCNC: 0.2 MG/DL (ref 0–1.2)
BILIRUB SERPL-MCNC: 0.2 MG/DL (ref 0–1.2)
BUN SERPL-MCNC: 14 MG/DL (ref 8–23)
BUN/CREAT SERPL: 16.5 (ref 7–25)
CALCIUM SPEC-SCNC: 8.4 MG/DL (ref 8.6–10.5)
CHLORIDE SERPL-SCNC: 100 MMOL/L (ref 98–107)
CO2 SERPL-SCNC: 23 MMOL/L (ref 22–29)
CREAT SERPL-MCNC: 0.84 MG/DL (ref 0.57–1)
CREAT SERPL-MCNC: 0.85 MG/DL (ref 0.57–1)
CRP SERPL-MCNC: 15.31 MG/DL (ref 0–0.5)
DEPRECATED RDW RBC AUTO: 49 FL (ref 37–54)
EOSINOPHIL # BLD AUTO: 0.02 10*3/MM3 (ref 0–0.4)
EOSINOPHIL NFR BLD AUTO: 0.2 % (ref 0.3–6.2)
ERYTHROCYTE [DISTWIDTH] IN BLOOD BY AUTOMATED COUNT: 14.5 % (ref 12.3–15.4)
FERRITIN SERPL-MCNC: 361.2 NG/ML (ref 13–150)
FLUAV SUBTYP SPEC NAA+PROBE: NOT DETECTED
FLUBV RNA ISLT QL NAA+PROBE: NOT DETECTED
GFR SERPL CREATININE-BSD FRML MDRD: 68 ML/MIN/1.73
GFR SERPL CREATININE-BSD FRML MDRD: 68 ML/MIN/1.73
GLOBULIN UR ELPH-MCNC: 3.5 GM/DL
GLUCOSE SERPL-MCNC: 99 MG/DL (ref 65–99)
HCT VFR BLD AUTO: 43.5 % (ref 34–46.6)
HGB BLD-MCNC: 14.4 G/DL (ref 12–15.9)
IMM GRANULOCYTES # BLD AUTO: 0.05 10*3/MM3 (ref 0–0.05)
IMM GRANULOCYTES NFR BLD AUTO: 0.6 % (ref 0–0.5)
LYMPHOCYTES # BLD AUTO: 2.2 10*3/MM3 (ref 0.7–3.1)
LYMPHOCYTES NFR BLD AUTO: 25.7 % (ref 19.6–45.3)
MCH RBC QN AUTO: 30.4 PG (ref 26.6–33)
MCHC RBC AUTO-ENTMCNC: 33.1 G/DL (ref 31.5–35.7)
MCV RBC AUTO: 91.8 FL (ref 79–97)
MONOCYTES # BLD AUTO: 0.43 10*3/MM3 (ref 0.1–0.9)
MONOCYTES NFR BLD AUTO: 5 % (ref 5–12)
NEUTROPHILS NFR BLD AUTO: 5.83 10*3/MM3 (ref 1.7–7)
NEUTROPHILS NFR BLD AUTO: 68 % (ref 42.7–76)
NRBC BLD AUTO-RTO: 0 /100 WBC (ref 0–0.2)
PLATELET # BLD AUTO: 205 10*3/MM3 (ref 140–450)
PMV BLD AUTO: 9.7 FL (ref 6–12)
POTASSIUM SERPL-SCNC: 4.1 MMOL/L (ref 3.5–5.2)
PROCALCITONIN SERPL-MCNC: 0.1 NG/ML (ref 0–0.25)
PROT SERPL-MCNC: 7.1 G/DL (ref 6–8.5)
PROT SERPL-MCNC: 7.5 G/DL (ref 6–8.5)
RBC # BLD AUTO: 4.74 10*6/MM3 (ref 3.77–5.28)
SARS-COV-2 RNA PNL SPEC NAA+PROBE: DETECTED
SODIUM SERPL-SCNC: 133 MMOL/L (ref 136–145)
WBC # BLD AUTO: 8.57 10*3/MM3 (ref 3.4–10.8)

## 2021-08-02 PROCEDURE — 85025 COMPLETE CBC W/AUTO DIFF WBC: CPT | Performed by: PHYSICIAN ASSISTANT

## 2021-08-02 PROCEDURE — 25010000002 DEXAMETHASONE PER 1 MG: Performed by: HOSPITALIST

## 2021-08-02 PROCEDURE — 82728 ASSAY OF FERRITIN: CPT | Performed by: HOSPITALIST

## 2021-08-02 PROCEDURE — 25010000002 ENOXAPARIN PER 10 MG: Performed by: HOSPITALIST

## 2021-08-02 PROCEDURE — 80053 COMPREHEN METABOLIC PANEL: CPT | Performed by: PHYSICIAN ASSISTANT

## 2021-08-02 PROCEDURE — 70450 CT HEAD/BRAIN W/O DYE: CPT

## 2021-08-02 PROCEDURE — 71045 X-RAY EXAM CHEST 1 VIEW: CPT

## 2021-08-02 PROCEDURE — G0378 HOSPITAL OBSERVATION PER HR: HCPCS

## 2021-08-02 PROCEDURE — 25010000002 BUTORPHANOL PER 1 MG: Performed by: STUDENT IN AN ORGANIZED HEALTH CARE EDUCATION/TRAINING PROGRAM

## 2021-08-02 PROCEDURE — 87040 BLOOD CULTURE FOR BACTERIA: CPT | Performed by: HOSPITALIST

## 2021-08-02 PROCEDURE — XW033E5 INTRODUCTION OF REMDESIVIR ANTI-INFECTIVE INTO PERIPHERAL VEIN, PERCUTANEOUS APPROACH, NEW TECHNOLOGY GROUP 5: ICD-10-PCS | Performed by: HOSPITALIST

## 2021-08-02 PROCEDURE — 99285 EMERGENCY DEPT VISIT HI MDM: CPT

## 2021-08-02 PROCEDURE — 25010000002 DIPHENHYDRAMINE PER 50 MG: Performed by: PHYSICIAN ASSISTANT

## 2021-08-02 PROCEDURE — 86140 C-REACTIVE PROTEIN: CPT | Performed by: HOSPITALIST

## 2021-08-02 PROCEDURE — 84145 PROCALCITONIN (PCT): CPT | Performed by: HOSPITALIST

## 2021-08-02 PROCEDURE — 25010000002 METOCLOPRAMIDE PER 10 MG: Performed by: HOSPITALIST

## 2021-08-02 PROCEDURE — 87040 BLOOD CULTURE FOR BACTERIA: CPT | Performed by: PHYSICIAN ASSISTANT

## 2021-08-02 PROCEDURE — 25010000002 METOCLOPRAMIDE PER 10 MG: Performed by: PHYSICIAN ASSISTANT

## 2021-08-02 PROCEDURE — 87636 SARSCOV2 & INF A&B AMP PRB: CPT | Performed by: PHYSICIAN ASSISTANT

## 2021-08-02 PROCEDURE — 36415 COLL VENOUS BLD VENIPUNCTURE: CPT | Performed by: HOSPITALIST

## 2021-08-02 PROCEDURE — 80076 HEPATIC FUNCTION PANEL: CPT | Performed by: HOSPITALIST

## 2021-08-02 PROCEDURE — 25010000002 KETOROLAC TROMETHAMINE PER 15 MG: Performed by: PHYSICIAN ASSISTANT

## 2021-08-02 PROCEDURE — 82565 ASSAY OF CREATININE: CPT | Performed by: HOSPITALIST

## 2021-08-02 RX ORDER — DEXAMETHASONE SODIUM PHOSPHATE 4 MG/ML
6 INJECTION, SOLUTION INTRA-ARTICULAR; INTRALESIONAL; INTRAMUSCULAR; INTRAVENOUS; SOFT TISSUE DAILY
Status: DISCONTINUED | OUTPATIENT
Start: 2021-08-02 | End: 2021-08-06 | Stop reason: HOSPADM

## 2021-08-02 RX ORDER — DIPHENOXYLATE HYDROCHLORIDE AND ATROPINE SULFATE 2.5; .025 MG/1; MG/1
1 TABLET ORAL DAILY
Status: DISCONTINUED | OUTPATIENT
Start: 2021-08-02 | End: 2021-08-06 | Stop reason: HOSPADM

## 2021-08-02 RX ORDER — EPINEPHRINE 1 MG/ML
0.3 INJECTION, SOLUTION INTRAMUSCULAR; SUBCUTANEOUS ONCE AS NEEDED
Status: DISCONTINUED | OUTPATIENT
Start: 2021-08-02 | End: 2021-08-02

## 2021-08-02 RX ORDER — BUTORPHANOL TARTRATE 1 MG/ML
1 INJECTION, SOLUTION INTRAMUSCULAR; INTRAVENOUS ONCE
Status: COMPLETED | OUTPATIENT
Start: 2021-08-02 | End: 2021-08-02

## 2021-08-02 RX ORDER — DULOXETIN HYDROCHLORIDE 60 MG/1
60 CAPSULE, DELAYED RELEASE ORAL DAILY
Status: DISCONTINUED | OUTPATIENT
Start: 2021-08-02 | End: 2021-08-06 | Stop reason: HOSPADM

## 2021-08-02 RX ORDER — DIPHENHYDRAMINE HYDROCHLORIDE 50 MG/ML
50 INJECTION INTRAMUSCULAR; INTRAVENOUS ONCE AS NEEDED
Status: DISCONTINUED | OUTPATIENT
Start: 2021-08-02 | End: 2021-08-02

## 2021-08-02 RX ORDER — KETOROLAC TROMETHAMINE 15 MG/ML
15 INJECTION, SOLUTION INTRAMUSCULAR; INTRAVENOUS EVERY 6 HOURS PRN
Status: DISCONTINUED | OUTPATIENT
Start: 2021-08-02 | End: 2021-08-06 | Stop reason: HOSPADM

## 2021-08-02 RX ORDER — DIPHENHYDRAMINE HYDROCHLORIDE 50 MG/ML
12.5 INJECTION INTRAMUSCULAR; INTRAVENOUS ONCE
Status: COMPLETED | OUTPATIENT
Start: 2021-08-02 | End: 2021-08-02

## 2021-08-02 RX ORDER — SODIUM CHLORIDE 0.9 % (FLUSH) 0.9 %
10 SYRINGE (ML) INJECTION AS NEEDED
Status: DISCONTINUED | OUTPATIENT
Start: 2021-08-02 | End: 2021-08-06 | Stop reason: HOSPADM

## 2021-08-02 RX ORDER — SODIUM CHLORIDE 9 MG/ML
30 INJECTION, SOLUTION INTRAVENOUS ONCE
Status: DISCONTINUED | OUTPATIENT
Start: 2021-08-02 | End: 2021-08-06 | Stop reason: HOSPADM

## 2021-08-02 RX ORDER — METOCLOPRAMIDE HYDROCHLORIDE 5 MG/ML
5 INJECTION INTRAMUSCULAR; INTRAVENOUS
Status: DISCONTINUED | OUTPATIENT
Start: 2021-08-02 | End: 2021-08-06 | Stop reason: HOSPADM

## 2021-08-02 RX ORDER — BUTALBITAL, ACETAMINOPHEN AND CAFFEINE 50; 325; 40 MG/1; MG/1; MG/1
1 TABLET ORAL EVERY 6 HOURS PRN
Status: DISCONTINUED | OUTPATIENT
Start: 2021-08-02 | End: 2021-08-06 | Stop reason: HOSPADM

## 2021-08-02 RX ORDER — METHYLPREDNISOLONE SODIUM SUCCINATE 125 MG/2ML
125 INJECTION, POWDER, LYOPHILIZED, FOR SOLUTION INTRAMUSCULAR; INTRAVENOUS ONCE AS NEEDED
Status: DISCONTINUED | OUTPATIENT
Start: 2021-08-02 | End: 2021-08-02

## 2021-08-02 RX ADMIN — DIPHENHYDRAMINE HYDROCHLORIDE 12.5 MG: 50 INJECTION INTRAMUSCULAR; INTRAVENOUS at 12:19

## 2021-08-02 RX ADMIN — BUTALBITAL, ACETAMINOPHEN, AND CAFFEINE 1 TABLET: 50; 325; 40 TABLET ORAL at 20:05

## 2021-08-02 RX ADMIN — METOCLOPRAMIDE 5 MG: 5 INJECTION, SOLUTION INTRAMUSCULAR; INTRAVENOUS at 12:16

## 2021-08-02 RX ADMIN — ENOXAPARIN SODIUM 40 MG: 40 INJECTION SUBCUTANEOUS at 20:07

## 2021-08-02 RX ADMIN — SODIUM CHLORIDE, PRESERVATIVE FREE 10 ML: 5 INJECTION INTRAVENOUS at 12:21

## 2021-08-02 RX ADMIN — METOCLOPRAMIDE 5 MG: 5 INJECTION, SOLUTION INTRAMUSCULAR; INTRAVENOUS at 20:06

## 2021-08-02 RX ADMIN — REMDESIVIR 200 MG: 100 INJECTION, POWDER, LYOPHILIZED, FOR SOLUTION INTRAVENOUS at 21:58

## 2021-08-02 RX ADMIN — DULOXETINE HYDROCHLORIDE 60 MG: 60 CAPSULE, DELAYED RELEASE ORAL at 20:05

## 2021-08-02 RX ADMIN — BUTORPHANOL TARTRATE 1 MG: 1 INJECTION, SOLUTION INTRAMUSCULAR; INTRAVENOUS at 10:55

## 2021-08-02 RX ADMIN — THERA TABS 1 TABLET: TAB at 20:07

## 2021-08-02 RX ADMIN — DEXAMETHASONE SODIUM PHOSPHATE 6 MG: 4 INJECTION, SOLUTION INTRAMUSCULAR; INTRAVENOUS at 20:07

## 2021-08-02 RX ADMIN — KETOROLAC TROMETHAMINE 15 MG: 15 INJECTION, SOLUTION INTRAMUSCULAR; INTRAVENOUS at 12:14

## 2021-08-02 NOTE — ED NOTES
Provider notified of pt O2 status, no new orders received. Will continue to monitor.      Cristy Thomas RN  08/02/21 111

## 2021-08-02 NOTE — ED PROVIDER NOTES
Subjective   Patient presents to emergency department for right parietal/occipital stabbing intermittent headache(every few minutes), cough, fatigue x 1 week.  She has tried NSAIDs, rest,  Acetaminophen without relief.  Symptoms are worsening by bright lights.  States she has a history of migraine headache but she has not had many recently.  She endorses the worse headache of her life.  She denies fever/chills, chest pain, shortness of breath, visual change, numbness/weakness/tingling, confusion, difficulty speaking, dizziness, syncope.      History provided by:  Patient   used: No        Review of Systems   Constitutional: Positive for appetite change and fatigue. Negative for chills and fever.   HENT: Negative for sore throat and trouble swallowing.    Respiratory: Positive for cough. Negative for shortness of breath and wheezing.    Cardiovascular: Negative for chest pain.   Gastrointestinal: Negative for nausea and vomiting.   Genitourinary: Negative for dysuria and flank pain.   Musculoskeletal: Negative for back pain.   Skin: Negative for color change.   Allergic/Immunologic: Negative for immunocompromised state.   Neurological: Positive for headaches. Negative for syncope and weakness.   Hematological: Does not bruise/bleed easily.   Psychiatric/Behavioral: Negative for confusion.       Past Medical History:   Diagnosis Date   • Anxiety    • Arthritis    • History of     • Ingrown toenail    • Knee pain, bilateral    • Migraine    • Plantar fasciitis    • Wears glasses        Allergies   Allergen Reactions   • Bactrim [Sulfamethoxazole-Trimethoprim] Hives   • Adhesive Tape Other (See Comments)     BLISTERS   • Amoxicillin-Pot Clavulanate Diarrhea   • Latex Rash   • Pregabalin Hives   • Sulfa Antibiotics Hives       Past Surgical History:   Procedure Laterality Date   • BACK SURGERY     • BREAST BIOPSY     •  SECTION     • HYSTERECTOMY     • KNEE ARTHROSCOPY  "Right 8/22/2018    Procedure: KNEE ARTHROSCOPY with debridement medial meniscus     (LATEX ALLERGY) ;  Surgeon: Carlos Manuel Sanchez MD;  Location: [facility] OR;  Service: Orthopedics   • KNEE ARTHROSCOPY Right 5/29/2019    Procedure: Right KNEE ARTHROSCOPY with debridement medial meniscus;  Surgeon: Carlos Manuel Sanchez MD;  Location: [facility] OR;  Service: Orthopedics   • KNEE ARTHROSCOPY Right 12/2/2019    Procedure: KNEE ARTHROSCOPY with debridement medial meniscus;  Surgeon: Carlos Manuel Sanchez MD;  Location: [facility] OR;  Service: Orthopedics   • MTP JOINT FUSION Right 1/24/2018    Procedure: FIRST METATARSOPHALANGEAL JOINT ARTHRODESIS       (C-ARM)                  LATEX ALLERGY;  Surgeon: Kwadwo Jensen DPM;  Location: [facility];  Service:    • SINUS SURGERY  1978       Family History   Problem Relation Age of Onset   • Heart disease Mother    • Hypertension Mother    • Thyroid disease Mother    • Heart disease Father    • Hypertension Father    • Thyroid disease Father    • Thyroid disease Sister    • Breast cancer Paternal Grandmother        Social History     Socioeconomic History   • Marital status:      Spouse name: Not on file   • Number of children: Not on file   • Years of education: Not on file   • Highest education level: Not on file   Tobacco Use   • Smoking status: Former Smoker   • Smokeless tobacco: Never Used   Substance and Sexual Activity   • Alcohol use: No   • Drug use: No   • Sexual activity: Defer           Objective      BP 92/50 (BP Location: Right arm, Patient Position: Lying)   Pulse 86   Temp 99.8 °F (37.7 °C) (Infrared)   Resp 20   Ht 160 cm (63\")   Wt 104 kg (230 lb)   LMP 07/02/2002 (Within Months)   SpO2 92%   BMI 40.74 kg/m²     Physical Exam  Vitals and nursing note reviewed.   Constitutional:       Appearance: Normal appearance.   HENT:      Head: Normocephalic and atraumatic.   Eyes:      Extraocular Movements: Extraocular movements intact.      " Conjunctiva/sclera: Conjunctivae normal.      Pupils: Pupils are equal, round, and reactive to light.   Cardiovascular:      Rate and Rhythm: Normal rate and regular rhythm.   Pulmonary:      Effort: Pulmonary effort is normal. No respiratory distress.      Breath sounds: Normal breath sounds. No wheezing.   Musculoskeletal:      Cervical back: No rigidity.   Skin:     General: Skin is warm.      Capillary Refill: Capillary refill takes less than 2 seconds.   Neurological:      Mental Status: She is alert. Mental status is at baseline.   Psychiatric:         Mood and Affect: Mood normal.         Behavior: Behavior normal.         Thought Content: Thought content normal.         Procedures           ED Course  ED Course as of Aug 02 1344   Mon Aug 02, 2021   1215 Called lab, they will find out what is causing delay and get it resulted.     COVID-19 and FLU A/B PCR - Swab, Nasopharynx [KVNG]   1253 Discussed risks and benefits of moncolonal antibody infusion and non FDA approved use under the emergency use act.  Patient agreed to infusion.      [KVNG]   1331 Patient now 89% on room air, she will require admission and is no longer a monoclonal antibody infusion candidate.      [KVNG]      ED Course User Index  [KVNG] Alireza Ascencio PA-C      Results for orders placed or performed during the hospital encounter of 08/02/21   COVID-19 and FLU A/B PCR - Swab, Nasopharynx    Specimen: Nasopharynx; Swab   Result Value Ref Range    COVID19 Detected (C) Not Detected - Ref. Range    Influenza A PCR Not Detected Not Detected    Influenza B PCR Not Detected Not Detected   Comprehensive Metabolic Panel    Specimen: Blood   Result Value Ref Range    Glucose 99 65 - 99 mg/dL    BUN 14 8 - 23 mg/dL    Creatinine 0.85 0.57 - 1.00 mg/dL    Sodium 133 (L) 136 - 145 mmol/L    Potassium 4.1 3.5 - 5.2 mmol/L    Chloride 100 98 - 107 mmol/L    CO2 23.0 22.0 - 29.0 mmol/L    Calcium 8.4 (L) 8.6 - 10.5 mg/dL    Total Protein 7.5 6.0 - 8.5 g/dL     Albumin 4.00 3.50 - 5.20 g/dL    ALT (SGPT) 24 1 - 33 U/L    AST (SGOT) 31 1 - 32 U/L    Alkaline Phosphatase 86 39 - 117 U/L    Total Bilirubin 0.2 0.0 - 1.2 mg/dL    eGFR Non African Amer 68 >60 mL/min/1.73    Globulin 3.5 gm/dL    A/G Ratio 1.1 g/dL    BUN/Creatinine Ratio 16.5 7.0 - 25.0    Anion Gap 10.0 5.0 - 15.0 mmol/L   CBC Auto Differential    Specimen: Blood   Result Value Ref Range    WBC 8.57 3.40 - 10.80 10*3/mm3    RBC 4.74 3.77 - 5.28 10*6/mm3    Hemoglobin 14.4 12.0 - 15.9 g/dL    Hematocrit 43.5 34.0 - 46.6 %    MCV 91.8 79.0 - 97.0 fL    MCH 30.4 26.6 - 33.0 pg    MCHC 33.1 31.5 - 35.7 g/dL    RDW 14.5 12.3 - 15.4 %    RDW-SD 49.0 37.0 - 54.0 fl    MPV 9.7 6.0 - 12.0 fL    Platelets 205 140 - 450 10*3/mm3    Neutrophil % 68.0 42.7 - 76.0 %    Lymphocyte % 25.7 19.6 - 45.3 %    Monocyte % 5.0 5.0 - 12.0 %    Eosinophil % 0.2 (L) 0.3 - 6.2 %    Basophil % 0.5 0.0 - 1.5 %    Immature Grans % 0.6 (H) 0.0 - 0.5 %    Neutrophils, Absolute 5.83 1.70 - 7.00 10*3/mm3    Lymphocytes, Absolute 2.20 0.70 - 3.10 10*3/mm3    Monocytes, Absolute 0.43 0.10 - 0.90 10*3/mm3    Eosinophils, Absolute 0.02 0.00 - 0.40 10*3/mm3    Basophils, Absolute 0.04 0.00 - 0.20 10*3/mm3    Immature Grans, Absolute 0.05 0.00 - 0.05 10*3/mm3    nRBC 0.0 0.0 - 0.2 /100 WBC     CT Head Without Contrast    Result Date: 8/2/2021  Narrative: EXAMINATION:  CT SCAN OF THE HEAD WITHOUT INTRAVENOUS CONTRAST CLINICAL INFORMATION:  worse headache of life x 1 week This exam was performed using radiation doses that are as low as reasonably achievable (ALARA). This exam was performed according to our departmental dose optimization program, which includes automated exposure control, adjustment of the mA and/or KV according to patient size and/or use of iterative reconstruction technique. COMPARISON: None available. TECHNIQUE:  Axial images from skull base to vertex.  FINDINGS: There is no evidence of intracranial hemorrhage, parenchymal mass,  midline shift, or focal mass effect. There is no hydrocephalus or effacement of the basilar cisterns. There is no extra-axial hemorrhage or collection identified. The mastoid air cells and visualized paranasal sinuses appear clear.       Impression: No evidence of intracranial hemorrhage, mass effect or large acute infarct. Electronically signed by:  Pete Rivers MD  8/2/2021 10:55 AM CDT Workstation: [id number]    XR Chest 1 View    Result Date: 8/2/2021  Narrative: EXAM DESCRIPTION:  XR CHEST 1 VW CLINICAL HISTORY: 61 years  Female -  cough COMPARISON: March 8, 2019 TECHNIQUE: One view-AP portable radiograph the chest FINDINGS: There are bilateral peripheral infiltrates compatible with viral pneumonia 19. Appropriate testing is recommended. The cardiac silhouette and pulmonary vasculature are within normal limits. There are no pleural effusions.     Impression: 1. Unfavorable change with interval development of bilateral peripheral groundglass opacities indicative of viral pneumonia 19. Appropriate testing is recommended. Commonly reported imaging features of COVID-19 pneumonia are present. Other processes such as influenza pneumonia and organizing pneumonia, as can be seen with drug toxicity and connective tissue disease, can cause similar imaging pattern. [PneTyp] Electronically signed by:  Mia Diaz MD  8/2/2021 9:58 AM CDT Workstation: [id number]                                         [facility]    Final diagnoses:   Pneumonia due to COVID-19 virus       ED Disposition  ED Disposition     ED Disposition Condition Comment    Decision to Admit  Level of Care: Telemetry [5]   Diagnosis: Pneumonia due to COVID-19 virus [[id number]]   Admitting Physician: JULIET ELLIS [[id number]]   Attending Physician: JULIET ELLIS [[id number]]            No follow-up provider specified.       Medication List      No changes were made to your prescriptions during this visit.          Alireza Ascencio PA-C  08/02/21 [id number]

## 2021-08-03 LAB
ALBUMIN SERPL-MCNC: 3.8 G/DL (ref 3.5–5.2)
ALBUMIN/GLOB SERPL: 1.2 G/DL
ALP SERPL-CCNC: 87 U/L (ref 39–117)
ALT SERPL W P-5'-P-CCNC: 62 U/L (ref 1–33)
ANION GAP SERPL CALCULATED.3IONS-SCNC: 11 MMOL/L (ref 5–15)
AST SERPL-CCNC: 82 U/L (ref 1–32)
BASOPHILS # BLD MANUAL: 0.05 10*3/MM3 (ref 0–0.2)
BASOPHILS NFR BLD AUTO: 1 % (ref 0–1.5)
BILIRUB CONJ SERPL-MCNC: <0.2 MG/DL (ref 0–0.3)
BILIRUB SERPL-MCNC: 0.2 MG/DL (ref 0–1.2)
BUN SERPL-MCNC: 17 MG/DL (ref 8–23)
BUN/CREAT SERPL: 24.3 (ref 7–25)
CALCIUM SPEC-SCNC: 8.5 MG/DL (ref 8.6–10.5)
CHLORIDE SERPL-SCNC: 104 MMOL/L (ref 98–107)
CO2 SERPL-SCNC: 22 MMOL/L (ref 22–29)
CREAT SERPL-MCNC: 0.7 MG/DL (ref 0.57–1)
CRP SERPL-MCNC: 14.29 MG/DL (ref 0–0.5)
D-DIMER, QUANTITATIVE (MAD,POW, STR): 1008 NG/ML (FEU) (ref 0–470)
DEPRECATED RDW RBC AUTO: 48.9 FL (ref 37–54)
ERYTHROCYTE [DISTWIDTH] IN BLOOD BY AUTOMATED COUNT: 14.4 % (ref 12.3–15.4)
FERRITIN SERPL-MCNC: 560.6 NG/ML (ref 13–150)
FIBRINOGEN PPP-MCNC: 679 MG/DL (ref 228–514)
GFR SERPL CREATININE-BSD FRML MDRD: 85 ML/MIN/1.73
GLOBULIN UR ELPH-MCNC: 3.3 GM/DL
GLUCOSE SERPL-MCNC: 147 MG/DL (ref 65–99)
HCT VFR BLD AUTO: 43.7 % (ref 34–46.6)
HGB BLD-MCNC: 14.3 G/DL (ref 12–15.9)
LYMPHOCYTES # BLD MANUAL: 1.72 10*3/MM3 (ref 0.7–3.1)
LYMPHOCYTES NFR BLD MANUAL: 32 % (ref 19.6–45.3)
LYMPHOCYTES NFR BLD MANUAL: 6 % (ref 5–12)
MCH RBC QN AUTO: 30 PG (ref 26.6–33)
MCHC RBC AUTO-ENTMCNC: 32.7 G/DL (ref 31.5–35.7)
MCV RBC AUTO: 91.6 FL (ref 79–97)
MONOCYTES # BLD AUTO: 0.31 10*3/MM3 (ref 0.1–0.9)
NEUTROPHILS # BLD AUTO: 3.13 10*3/MM3 (ref 1.7–7)
NEUTROPHILS NFR BLD MANUAL: 50 % (ref 42.7–76)
NEUTS BAND NFR BLD MANUAL: 10 % (ref 0–5)
PLATELET # BLD AUTO: 222 10*3/MM3 (ref 140–450)
PMV BLD AUTO: 10.1 FL (ref 6–12)
POTASSIUM SERPL-SCNC: 4.4 MMOL/L (ref 3.5–5.2)
PROT SERPL-MCNC: 7.1 G/DL (ref 6–8.5)
RBC # BLD AUTO: 4.77 10*6/MM3 (ref 3.77–5.28)
RBC MORPH BLD: NORMAL
SMALL PLATELETS BLD QL SMEAR: ADEQUATE
SODIUM SERPL-SCNC: 137 MMOL/L (ref 136–145)
VARIANT LYMPHS NFR BLD MANUAL: 1 % (ref 0–5)
WBC # BLD AUTO: 5.22 10*3/MM3 (ref 3.4–10.8)
WBC MORPH BLD: NORMAL

## 2021-08-03 PROCEDURE — 82248 BILIRUBIN DIRECT: CPT | Performed by: HOSPITALIST

## 2021-08-03 PROCEDURE — 80053 COMPREHEN METABOLIC PANEL: CPT | Performed by: HOSPITALIST

## 2021-08-03 PROCEDURE — 94760 N-INVAS EAR/PLS OXIMETRY 1: CPT

## 2021-08-03 PROCEDURE — 85379 FIBRIN DEGRADATION QUANT: CPT | Performed by: HOSPITALIST

## 2021-08-03 PROCEDURE — 85007 BL SMEAR W/DIFF WBC COUNT: CPT | Performed by: HOSPITALIST

## 2021-08-03 PROCEDURE — 25010000002 ENOXAPARIN PER 10 MG: Performed by: HOSPITALIST

## 2021-08-03 PROCEDURE — 85384 FIBRINOGEN ACTIVITY: CPT | Performed by: HOSPITALIST

## 2021-08-03 PROCEDURE — 63710000001 DEXAMETHASONE PER 0.25 MG: Performed by: HOSPITALIST

## 2021-08-03 PROCEDURE — 86140 C-REACTIVE PROTEIN: CPT | Performed by: HOSPITALIST

## 2021-08-03 PROCEDURE — 25010000002 KETOROLAC TROMETHAMINE PER 15 MG: Performed by: HOSPITALIST

## 2021-08-03 PROCEDURE — 25010000002 METOCLOPRAMIDE PER 10 MG: Performed by: HOSPITALIST

## 2021-08-03 PROCEDURE — 85025 COMPLETE CBC W/AUTO DIFF WBC: CPT | Performed by: HOSPITALIST

## 2021-08-03 PROCEDURE — 82728 ASSAY OF FERRITIN: CPT | Performed by: HOSPITALIST

## 2021-08-03 RX ADMIN — METOCLOPRAMIDE 5 MG: 5 INJECTION, SOLUTION INTRAMUSCULAR; INTRAVENOUS at 21:05

## 2021-08-03 RX ADMIN — BUTALBITAL, ACETAMINOPHEN, AND CAFFEINE 1 TABLET: 50; 325; 40 TABLET ORAL at 21:04

## 2021-08-03 RX ADMIN — DEXAMETHASONE 6 MG: 2 TABLET ORAL at 08:49

## 2021-08-03 RX ADMIN — METOCLOPRAMIDE 5 MG: 5 INJECTION, SOLUTION INTRAMUSCULAR; INTRAVENOUS at 08:49

## 2021-08-03 RX ADMIN — DULOXETINE HYDROCHLORIDE 60 MG: 60 CAPSULE, DELAYED RELEASE ORAL at 08:49

## 2021-08-03 RX ADMIN — BUTALBITAL, ACETAMINOPHEN, AND CAFFEINE 1 TABLET: 50; 325; 40 TABLET ORAL at 02:43

## 2021-08-03 RX ADMIN — METOCLOPRAMIDE 5 MG: 5 INJECTION, SOLUTION INTRAMUSCULAR; INTRAVENOUS at 17:32

## 2021-08-03 RX ADMIN — ENOXAPARIN SODIUM 40 MG: 40 INJECTION SUBCUTANEOUS at 21:05

## 2021-08-03 RX ADMIN — KETOROLAC TROMETHAMINE 15 MG: 15 INJECTION, SOLUTION INTRAMUSCULAR; INTRAVENOUS at 16:20

## 2021-08-03 RX ADMIN — THERA TABS 1 TABLET: TAB at 08:49

## 2021-08-03 RX ADMIN — METOCLOPRAMIDE 5 MG: 5 INJECTION, SOLUTION INTRAMUSCULAR; INTRAVENOUS at 10:35

## 2021-08-03 RX ADMIN — KETOROLAC TROMETHAMINE 15 MG: 15 INJECTION, SOLUTION INTRAMUSCULAR; INTRAVENOUS at 08:52

## 2021-08-03 RX ADMIN — BUTALBITAL, ACETAMINOPHEN, AND CAFFEINE 1 TABLET: 50; 325; 40 TABLET ORAL at 10:35

## 2021-08-03 RX ADMIN — REMDESIVIR 100 MG: 100 INJECTION, POWDER, LYOPHILIZED, FOR SOLUTION INTRAVENOUS at 21:05

## 2021-08-03 NOTE — H&P
[facility] Medicine Admission      Date of Admission: 2021      Primary Care Physician: Papi Mckeon MD      Chief Complaint: Cough and headache    HPI: Patient is a 61-year-old female past medical history of anxiety and migraine who presented to the emergency department with an 8-day history of cough and headache.  Patient states that she developed a significant cough approximately 8 days ago that she attributed to allergies.  Then she developed a severe headache that she described as the worst headache of her life.  Patient states that she never really had any significant shortness of breath, and that her cough improved.  The only symptom that she was left with was an unrelenting severe headache.  No alleviating or exacerbating factors.  Patient has not been vaccinated for Covid.    Concurrent Medical History:  has a past medical history of Anxiety, Arthritis, History of  (), Ingrown toenail, Knee pain, bilateral, Migraine, Plantar fasciitis, and Wears glasses.    Past Surgical History:  has a past surgical history that includes Hysterectomy ();  section (); Back surgery (); Sinus surgery (); mtp joint fusion (Right, 2018); Knee Arthroscopy (Right, 2018); Breast biopsy; Knee Arthroscopy (Right, 2019); and Knee Arthroscopy (Right, 2019).    Family History: family history includes Breast cancer in her paternal grandmother; Heart disease in her father and mother; Hypertension in her father and mother; Thyroid disease in her father, mother, and sister.     Social History:  reports that she has quit smoking. She has never used smokeless tobacco. She reports that she does not drink alcohol and does not use drugs.    Allergies:   Allergies   Allergen Reactions   • Bactrim [Sulfamethoxazole-Trimethoprim] Hives   • Adhesive Tape Other (See Comments)     BLISTERS   • Amoxicillin-Pot Clavulanate Diarrhea   • Latex  Rash   • Pregabalin Hives   • Sulfa Antibiotics Hives       Medications:   Prior to Admission medications    Medication Sig Start Date End Date Taking? Authorizing Provider   Cholecalciferol (VITAMIN D PO) Take 2,000 Units by mouth Daily.   Yes Lianna Alva MD   DULoxetine (CYMBALTA) 60 MG capsule Take 1 capsule by mouth Daily. 6/1/21  Yes Papi Mckeon MD   Multiple Vitamin (MULTI-VITAMIN DAILY PO) Take 1 tablet by mouth Daily.   Yes Lianna Alva MD   Selenium (SELENIMIN PO) Take 1 tablet by mouth Daily.   Yes Lianna Alva MD   vitamin C (ASCORBIC ACID) 500 MG tablet Take 500 mg by mouth Daily. Time released   Yes Lianna Alva MD   HYDROcodone-acetaminophen (Norco) 7.5-325 MG per tablet Take 1 tablet by mouth 4 (Four) Times a Day As Needed for Moderate Pain  (pain). 1/22/21 8/2/21  Papi Mckeon MD       Review of Systems:  Review of Systems   Constitutional: Negative for appetite change, chills, fatigue, fever and unexpected weight change.   HENT: Negative for congestion, facial swelling, hearing loss, nosebleeds, rhinorrhea, sneezing, trouble swallowing and voice change.    Eyes: Negative for photophobia and visual disturbance.   Respiratory: Positive for cough. Negative for apnea, choking, chest tightness, shortness of breath, wheezing and stridor.    Cardiovascular: Negative for chest pain, palpitations and leg swelling.   Gastrointestinal: Negative for abdominal pain, blood in stool, constipation, diarrhea, nausea and vomiting.   Endocrine: Negative for cold intolerance, heat intolerance, polydipsia, polyphagia and polyuria.   Genitourinary: Negative for dysuria, flank pain and hematuria.   Musculoskeletal: Negative for arthralgias, back pain, myalgias and neck pain.   Skin: Negative for rash and wound.   Allergic/Immunologic: Negative for immunocompromised state.   Neurological: Positive for headaches. Negative for dizziness, seizures, syncope, speech  difficulty, weakness, light-headedness and numbness.   Hematological: Does not bruise/bleed easily.   Psychiatric/Behavioral: Negative for agitation, behavioral problems, confusion, decreased concentration, hallucinations, self-injury and suicidal ideas. The patient is not nervous/anxious.       Otherwise complete ROS is negative except as mentioned above.    Physical Exam:   Temp:  [97.8 °F (36.6 °C)-100 °F (37.8 °C)] 100 °F (37.8 °C)  Heart Rate:  [] 92  Resp:  [18-20] 18  BP: ()/(50-94) 121/64     Physical Exam  Constitutional:       Appearance: She is well-developed.   HENT:      Head: Normocephalic and atraumatic.      Nose: Nose normal.   Eyes:      General: Lids are normal. No scleral icterus.     Conjunctiva/sclera: Conjunctivae normal.      Pupils: Pupils are equal, round, and reactive to light.   Neck:      Vascular: No JVD.      Trachea: No tracheal tenderness or tracheal deviation.   Cardiovascular:      Rate and Rhythm: Normal rate and regular rhythm.      Pulses: Normal pulses.      Heart sounds: Normal heart sounds, S1 normal and S2 normal. No murmur heard.   No friction rub. No gallop.    Pulmonary:      Effort: Pulmonary effort is normal. No accessory muscle usage or respiratory distress.      Breath sounds: Normal breath sounds. No decreased breath sounds, wheezing or rales.   Chest:      Chest wall: No tenderness.   Abdominal:      General: Bowel sounds are normal. There is no distension.      Palpations: Abdomen is soft. There is no mass.      Tenderness: There is no abdominal tenderness. There is no guarding or rebound.   Musculoskeletal:         General: No tenderness.      Right shoulder: No tenderness. Normal range of motion.      Cervical back: Normal range of motion and neck supple. No edema or rigidity. No spinous process tenderness. Normal range of motion.   Skin:     General: Skin is warm.      Coloration: Skin is not pale.      Findings: No rash.   Neurological:      Mental  Status: She is alert and oriented to person, place, and time.      Cranial Nerves: No cranial nerve deficit.      Sensory: No sensory deficit.      Motor: No atrophy, abnormal muscle tone or seizure activity.      Coordination: Coordination normal.      Deep Tendon Reflexes: Reflexes are normal and symmetric. Reflexes normal.   Psychiatric:         Behavior: Behavior normal.         Thought Content: Thought content normal.         Judgment: Judgment normal.           Results Reviewed:  I have personally reviewed current lab, radiology, and data and agree with results.  Lab Results (last 24 hours)     Procedure Component Value Units Date/Time    Blood Culture - Blood, Arm, Left [[id number]] Collected: 08/02/21 1901    Specimen: Blood from Arm, Left Updated: 08/02/21 1911    Procalcitonin [[id number]] Collected: 08/02/21 1046    Specimen: Blood Updated: 08/02/21 1909    C-reactive Protein [[id number]] Collected: 08/02/21 1046    Specimen: Blood Updated: 08/02/21 1909    Ferritin [[id number]] Collected: 08/02/21 1046    Specimen: Blood Updated: 08/02/21 1909    Hepatic Function Panel [[id number]] Collected: 08/02/21 1046    Specimen: Blood Updated: 08/02/21 1909    Creatinine, Serum [[id number]] Collected: 08/02/21 1046    Specimen: Blood Updated: 08/02/21 1909    Blood Culture - Blood, Arm, Right [[id number]] Collected: 08/02/21 1437    Specimen: Blood from Arm, Right Updated: 08/02/21 1447    COVID-19 and FLU A/B PCR - Swab, Nasopharynx [[id number]]  (Abnormal) Collected: 08/02/21 1055    Specimen: Swab from Nasopharynx Updated: 08/02/21 1220     COVID19 Detected     Comment: Enhanced Precautions Requested          Influenza A PCR Not Detected     Influenza B PCR Not Detected    Narrative:      Fact sheet for providers: https://www.fda.gov/media/983681/download    Fact sheet for patients: https://www.fda.gov/media/745383/download    Test performed by PCR.  Influenza A and Influenza B negative results should be considered  presumptive in samples that have a positive SARS-CoV-2 result.    Competitive inhibition studies showed that SARS-CoV-2 virus, when present at concentrations above 3.6E+04 copies/mL, can inhibit the detection and amplification of influenza A and influenza B virus RNA if present at or below 1.8E+02 copies/mL or 4.9E+02 copies/mL, respectively, and may lead to false negative influenza virus results. If co-infection with influenza A or influenza B virus is suspected in samples with a positive SARS-CoV-2 result, the sample should be re-tested with another FDA cleared, approved, or authorized influenza test, if influenza virus detection would change clinical management.    Comprehensive Metabolic Panel [[id number]]  (Abnormal) Collected: 08/02/21 1046    Specimen: Blood Updated: 08/02/21 1112     Glucose 99 mg/dL      BUN 14 mg/dL      Creatinine 0.85 mg/dL      Sodium 133 mmol/L      Potassium 4.1 mmol/L      Chloride 100 mmol/L      CO2 23.0 mmol/L      Calcium 8.4 mg/dL      Total Protein 7.5 g/dL      Albumin 4.00 g/dL      ALT (SGPT) 24 U/L      AST (SGOT) 31 U/L      Alkaline Phosphatase 86 U/L      Total Bilirubin 0.2 mg/dL      eGFR Non African Amer 68 mL/min/1.73      Globulin 3.5 gm/dL      A/G Ratio 1.1 g/dL      BUN/Creatinine Ratio 16.5     Anion Gap 10.0 mmol/L     Narrative:      GFR Normal >60  Chronic Kidney Disease <60  Kidney Failure <15      CBC & Differential [[id number]]  (Abnormal) Collected: 08/02/21 1046    Specimen: Blood Updated: 08/02/21 1052    Narrative:      The following orders were created for panel order CBC & Differential.  Procedure                               Abnormality         Status                     ---------                               -----------         ------                     CBC Auto Differential[[id number]]        Abnormal            Final result                 Please view results for these tests on the individual orders.    CBC Auto Differential [[id number]]   (Abnormal) Collected: 08/02/21 1046    Specimen: Blood Updated: 08/02/21 1052     WBC 8.57 10*3/mm3      RBC 4.74 10*6/mm3      Hemoglobin 14.4 g/dL      Hematocrit 43.5 %      MCV 91.8 fL      MCH 30.4 pg      MCHC 33.1 g/dL      RDW 14.5 %      RDW-SD 49.0 fl      MPV 9.7 fL      Platelets 205 10*3/mm3      Neutrophil % 68.0 %      Lymphocyte % 25.7 %      Monocyte % 5.0 %      Eosinophil % 0.2 %      Basophil % 0.5 %      Immature Grans % 0.6 %      Neutrophils, Absolute 5.83 10*3/mm3      Lymphocytes, Absolute 2.20 10*3/mm3      Monocytes, Absolute 0.43 10*3/mm3      Eosinophils, Absolute 0.02 10*3/mm3      Basophils, Absolute 0.04 10*3/mm3      Immature Grans, Absolute 0.05 10*3/mm3      nRBC 0.0 /100 WBC         Imaging Results (Last 24 Hours)     Procedure Component Value Units Date/Time    CT Head Without Contrast [[id number]] Collected: 08/02/21 1020     Updated: 08/02/21 1056    Narrative:      EXAMINATION:  CT SCAN OF THE HEAD WITHOUT INTRAVENOUS CONTRAST    CLINICAL INFORMATION:  worse headache of life x 1 week    This exam was performed using radiation doses that are as low as  reasonably achievable (ALARA).  This exam was performed according to our departmental dose  optimization program, which includes automated exposure control,  adjustment of the mA and/or KV according to patient size and/or  use of iterative reconstruction technique.    COMPARISON: None available.    TECHNIQUE:  Axial images from skull base to vertex.        FINDINGS:      There is no evidence of intracranial hemorrhage, parenchymal  mass, midline shift, or focal mass effect.  There is no hydrocephalus or effacement of the basilar cisterns.    There is no extra-axial hemorrhage or collection identified.    The mastoid air cells and visualized paranasal sinuses appear  clear.          Impression:      No evidence of intracranial hemorrhage, mass effect or large  acute infarct.      Electronically signed by:  Pete Rivers MD  8/2/2021  10:55 AM CDT  Workstation: [id number]    XR Chest 1 View [[id number]] Collected: 08/02/21 0943     Updated: 08/02/21 0959    Narrative:      EXAM DESCRIPTION:     XR CHEST 1 VW    CLINICAL HISTORY:     61 years  Female -  cough    COMPARISON:     March 8, 2019    TECHNIQUE:     One view-AP portable radiograph the chest    FINDINGS:     There are bilateral peripheral infiltrates compatible with viral  pneumonia 19. Appropriate testing is recommended. The cardiac  silhouette and pulmonary vasculature are within normal limits.  There are no pleural effusions.      Impression:          1. Unfavorable change with interval development of bilateral  peripheral groundglass opacities indicative of viral pneumonia  19. Appropriate testing is recommended.    Commonly reported imaging features of COVID-19 pneumonia are  present. Other processes such as influenza pneumonia and  organizing pneumonia, as can be seen with drug toxicity and  connective tissue disease, can cause similar imaging pattern.  [PneTyp]        Electronically signed by:  Mia Diaz MD  8/2/2021 9:58 AM CDT  Workstation: [id number]            Assessment:    Active Hospital Problems    Diagnosis    • Pneumonia due to COVID-19 virus              Plan:  1.  Acute hypoxic respiratory failure: Secondary to Covid 19 pneumonia.  Patient became hypoxic on room air and is currently requiring 2 L of oxygen per nasal cannula.  Will wean as tolerated.  2.  COVID-19 pneumonia: Chest x-ray shows bilateral groundglass opacities.  Patient has not been vaccinated.  Will start on Decadron and remdesivir.  Follow Covid labs.  3.  Headache: Secondary to COVID-19.  Will place on Fioricet.  4.  DVT prophylaxis Lovenox.    I confirmed that the patient's Advance Care Plan is present, code status is documented, or surrogate decision maker is listed in the patient's medical record.     I have utilized all available immediate resources to obtain, update, or review the patient's  current medications.     I discussed the patient's findings and my recommendations with: Patient        This document has been electronically signed by Osvaldo Zhang MD on August 2, 2021 19:15 CDT

## 2021-08-03 NOTE — PLAN OF CARE
Goal Outcome Evaluation:  Plan of Care Reviewed With: patient        Progress: no change  Outcome Summary: Patient complained of headache and was medicated X2 with good results. Sleeping most of the night. Condition remains stable. in no acute distress. will continue to monitor.

## 2021-08-03 NOTE — PLAN OF CARE
Goal Outcome Evaluation:           Progress: improving  Outcome Summary: VS stable, O2 sats stable on RA, headache improved, will continue to monitor.

## 2021-08-03 NOTE — PROGRESS NOTES
[facility] Medicine Services  INPATIENT PROGRESS NOTE    Length of Stay: 0  Date of Admission: 8/2/2021  Primary Care Physician: Papi Mckeon MD    Subjective   Chief Complaint: Headache  HPI: Patient reports ongoing headache but improvement.  She has some occasional shortness of breath but overall doing better.  Patient still notes feeling poorly with gradual improvement.    Review of Systems   Constitutional: Positive for activity change. Negative for chills and fever.   HENT: Negative for congestion.    Respiratory: Positive for shortness of breath. Negative for cough and wheezing.    Cardiovascular: Negative for chest pain and palpitations.   Gastrointestinal: Negative for abdominal pain, constipation, diarrhea, nausea and vomiting.   Genitourinary: Negative.    Musculoskeletal: Positive for myalgias.   Neurological: Positive for headaches.   Psychiatric/Behavioral: Negative.    All other systems reviewed and are negative.     All pertinent negatives and positives are as above. All other systems have been reviewed and are negative unless otherwise stated.     Objective    Temp:  [96.9 °F (36.1 °C)-100 °F (37.8 °C)] 96.9 °F (36.1 °C)  Heart Rate:  [] 74  Resp:  [18-20] 18  BP: ()/(50-71) 119/56    Physical Exam  Constitutional:       Comments: Appears in mild distress   HENT:      Head: Normocephalic and atraumatic.      Right Ear: External ear normal.      Left Ear: External ear normal.      Nose: Nose normal.      Mouth/Throat:      Mouth: Mucous membranes are moist.      Pharynx: Oropharynx is clear.   Eyes:      Conjunctiva/sclera: Conjunctivae normal.   Cardiovascular:      Rate and Rhythm: Normal rate and regular rhythm.      Pulses: Normal pulses.      Heart sounds: Normal heart sounds.   Pulmonary:      Comments: Diminished but clear bilaterally.  Abdominal:      General: Bowel sounds are normal.      Palpations: Abdomen is soft.       Tenderness: There is no abdominal tenderness.   Musculoskeletal:         General: No swelling.      Cervical back: Neck supple.   Skin:     General: Skin is warm and dry.      Capillary Refill: Capillary refill takes less than 2 seconds.   Neurological:      General: No focal deficit present.      Mental Status: She is alert and oriented to person, place, and time. Mental status is at baseline.      Coordination: Coordination normal.   Psychiatric:         Mood and Affect: Mood normal.         Behavior: Behavior normal.         Results Review:  I have reviewed the labs, radiology results, and diagnostic studies.    Laboratory Data:   Results from last 7 days   Lab Units 08/03/21  0551 08/02/21  1046   SODIUM mmol/L 137 133*   POTASSIUM mmol/L 4.4 4.1   CHLORIDE mmol/L 104 100   CO2 mmol/L 22.0 23.0   BUN mg/dL 17 14   CREATININE mg/dL 0.70 0.84  0.85   GLUCOSE mg/dL 147* 99   CALCIUM mg/dL 8.5* 8.4*   BILIRUBIN mg/dL 0.2 0.2  0.2   ALK PHOS U/L 87 84  86   ALT (SGPT) U/L 62* 25  24   AST (SGOT) U/L 82* 34*  31   ANION GAP mmol/L 11.0 10.0     Estimated Creatinine Clearance: 95.3 mL/min (by C-G formula based on SCr of 0.7 mg/dL).          Results from last 7 days   Lab Units 08/03/21  0551 08/02/21  1046   WBC 10*3/mm3 5.22 8.57   HEMOGLOBIN g/dL 14.3 14.4   HEMATOCRIT % 43.7 43.5   PLATELETS 10*3/mm3 222 205           Culture Data:   No results found for: BLOODCX  No results found for: URINECX  No results found for: RESPCX  No results found for: WOUNDCX  No results found for: STOOLCX  No components found for: BODYFLD    Radiology Data:   Imaging Results (Last 24 Hours)     ** No results found for the last 24 hours. **          I have reviewed the patient's current medications.     Assessment/Plan     Active Problems:    Pneumonia due to COVID-19 virus    Plan:  -Continue supplemental oxygen wean as tolerated  -Continue Decadron  -Continue remdesivir  -Continue Covid monitoring labs  -Continue Fioricet and  Toradol combination for headache relief.  This is likely related to her underlying viral illness.  -DVT prophylaxis with Lovenox  -CODE STATUS: Full    I confirmed that the patient's Advance Care Plan is present, code status is documented, or surrogate decision maker is listed in the patient's medical record.     Discharge Planning: In process    Og Morales MD

## 2021-08-04 LAB
ALBUMIN SERPL-MCNC: 3.6 G/DL (ref 3.5–5.2)
ALBUMIN/GLOB SERPL: 1.1 G/DL
ALP SERPL-CCNC: 76 U/L (ref 39–117)
ALT SERPL W P-5'-P-CCNC: 120 U/L (ref 1–33)
ANION GAP SERPL CALCULATED.3IONS-SCNC: 10 MMOL/L (ref 5–15)
AST SERPL-CCNC: 94 U/L (ref 1–32)
BASOPHILS # BLD AUTO: 0.02 10*3/MM3 (ref 0–0.2)
BASOPHILS NFR BLD AUTO: 0.2 % (ref 0–1.5)
BILIRUB CONJ SERPL-MCNC: <0.2 MG/DL (ref 0–0.3)
BILIRUB SERPL-MCNC: 0.2 MG/DL (ref 0–1.2)
BUN SERPL-MCNC: 20 MG/DL (ref 8–23)
BUN/CREAT SERPL: 29 (ref 7–25)
CALCIUM SPEC-SCNC: 8.1 MG/DL (ref 8.6–10.5)
CHLORIDE SERPL-SCNC: 103 MMOL/L (ref 98–107)
CO2 SERPL-SCNC: 21 MMOL/L (ref 22–29)
CREAT SERPL-MCNC: 0.69 MG/DL (ref 0.57–1)
CRP SERPL-MCNC: 5.99 MG/DL (ref 0–0.5)
DEPRECATED RDW RBC AUTO: 47.2 FL (ref 37–54)
EOSINOPHIL # BLD AUTO: 0 10*3/MM3 (ref 0–0.4)
EOSINOPHIL NFR BLD AUTO: 0 % (ref 0.3–6.2)
ERYTHROCYTE [DISTWIDTH] IN BLOOD BY AUTOMATED COUNT: 14.2 % (ref 12.3–15.4)
FERRITIN SERPL-MCNC: 604 NG/ML (ref 13–150)
GFR SERPL CREATININE-BSD FRML MDRD: 86 ML/MIN/1.73
GLOBULIN UR ELPH-MCNC: 3.2 GM/DL
GLUCOSE SERPL-MCNC: 127 MG/DL (ref 65–99)
HCT VFR BLD AUTO: 40.5 % (ref 34–46.6)
HGB BLD-MCNC: 13.6 G/DL (ref 12–15.9)
IMM GRANULOCYTES # BLD AUTO: 0.06 10*3/MM3 (ref 0–0.05)
IMM GRANULOCYTES NFR BLD AUTO: 0.6 % (ref 0–0.5)
LYMPHOCYTES # BLD AUTO: 2.14 10*3/MM3 (ref 0.7–3.1)
LYMPHOCYTES NFR BLD AUTO: 20 % (ref 19.6–45.3)
MCH RBC QN AUTO: 30.4 PG (ref 26.6–33)
MCHC RBC AUTO-ENTMCNC: 33.6 G/DL (ref 31.5–35.7)
MCV RBC AUTO: 90.4 FL (ref 79–97)
MONOCYTES # BLD AUTO: 0.79 10*3/MM3 (ref 0.1–0.9)
MONOCYTES NFR BLD AUTO: 7.4 % (ref 5–12)
NEUTROPHILS NFR BLD AUTO: 7.71 10*3/MM3 (ref 1.7–7)
NEUTROPHILS NFR BLD AUTO: 71.8 % (ref 42.7–76)
NRBC BLD AUTO-RTO: 0 /100 WBC (ref 0–0.2)
PLATELET # BLD AUTO: 256 10*3/MM3 (ref 140–450)
PMV BLD AUTO: 10 FL (ref 6–12)
POTASSIUM SERPL-SCNC: 3.8 MMOL/L (ref 3.5–5.2)
PROT SERPL-MCNC: 6.8 G/DL (ref 6–8.5)
RBC # BLD AUTO: 4.48 10*6/MM3 (ref 3.77–5.28)
SODIUM SERPL-SCNC: 134 MMOL/L (ref 136–145)
WBC # BLD AUTO: 10.72 10*3/MM3 (ref 3.4–10.8)

## 2021-08-04 PROCEDURE — 80053 COMPREHEN METABOLIC PANEL: CPT | Performed by: HOSPITALIST

## 2021-08-04 PROCEDURE — 86140 C-REACTIVE PROTEIN: CPT | Performed by: HOSPITALIST

## 2021-08-04 PROCEDURE — 82248 BILIRUBIN DIRECT: CPT | Performed by: HOSPITALIST

## 2021-08-04 PROCEDURE — 94799 UNLISTED PULMONARY SVC/PX: CPT

## 2021-08-04 PROCEDURE — 25010000002 METOCLOPRAMIDE PER 10 MG: Performed by: HOSPITALIST

## 2021-08-04 PROCEDURE — 25010000002 KETOROLAC TROMETHAMINE PER 15 MG: Performed by: HOSPITALIST

## 2021-08-04 PROCEDURE — 63710000001 DEXAMETHASONE PER 0.25 MG: Performed by: HOSPITALIST

## 2021-08-04 PROCEDURE — 85025 COMPLETE CBC W/AUTO DIFF WBC: CPT | Performed by: HOSPITALIST

## 2021-08-04 PROCEDURE — 82728 ASSAY OF FERRITIN: CPT | Performed by: HOSPITALIST

## 2021-08-04 PROCEDURE — 25010000002 ENOXAPARIN PER 10 MG: Performed by: HOSPITALIST

## 2021-08-04 RX ORDER — LANOLIN ALCOHOL/MO/W.PET/CERES
6 CREAM (GRAM) TOPICAL NIGHTLY PRN
Status: DISCONTINUED | OUTPATIENT
Start: 2021-08-04 | End: 2021-08-06 | Stop reason: HOSPADM

## 2021-08-04 RX ADMIN — DULOXETINE HYDROCHLORIDE 60 MG: 60 CAPSULE, DELAYED RELEASE ORAL at 08:29

## 2021-08-04 RX ADMIN — ENOXAPARIN SODIUM 40 MG: 40 INJECTION SUBCUTANEOUS at 21:31

## 2021-08-04 RX ADMIN — KETOROLAC TROMETHAMINE 15 MG: 15 INJECTION, SOLUTION INTRAMUSCULAR; INTRAVENOUS at 22:53

## 2021-08-04 RX ADMIN — THERA TABS 1 TABLET: TAB at 08:29

## 2021-08-04 RX ADMIN — KETOROLAC TROMETHAMINE 15 MG: 15 INJECTION, SOLUTION INTRAMUSCULAR; INTRAVENOUS at 01:49

## 2021-08-04 RX ADMIN — Medication 6 MG: at 22:01

## 2021-08-04 RX ADMIN — KETOROLAC TROMETHAMINE 15 MG: 15 INJECTION, SOLUTION INTRAMUSCULAR; INTRAVENOUS at 16:43

## 2021-08-04 RX ADMIN — BUTALBITAL, ACETAMINOPHEN, AND CAFFEINE 1 TABLET: 50; 325; 40 TABLET ORAL at 11:36

## 2021-08-04 RX ADMIN — METOCLOPRAMIDE 5 MG: 5 INJECTION, SOLUTION INTRAMUSCULAR; INTRAVENOUS at 16:43

## 2021-08-04 RX ADMIN — BUTALBITAL, ACETAMINOPHEN, AND CAFFEINE 1 TABLET: 50; 325; 40 TABLET ORAL at 02:47

## 2021-08-04 RX ADMIN — METOCLOPRAMIDE 5 MG: 5 INJECTION, SOLUTION INTRAMUSCULAR; INTRAVENOUS at 08:29

## 2021-08-04 RX ADMIN — DEXAMETHASONE 6 MG: 2 TABLET ORAL at 08:29

## 2021-08-04 RX ADMIN — METOCLOPRAMIDE 5 MG: 5 INJECTION, SOLUTION INTRAMUSCULAR; INTRAVENOUS at 21:31

## 2021-08-04 RX ADMIN — METOCLOPRAMIDE 5 MG: 5 INJECTION, SOLUTION INTRAMUSCULAR; INTRAVENOUS at 11:29

## 2021-08-04 RX ADMIN — KETOROLAC TROMETHAMINE 15 MG: 15 INJECTION, SOLUTION INTRAMUSCULAR; INTRAVENOUS at 08:29

## 2021-08-04 RX ADMIN — REMDESIVIR 100 MG: 100 INJECTION, POWDER, LYOPHILIZED, FOR SOLUTION INTRAVENOUS at 21:52

## 2021-08-04 NOTE — PROGRESS NOTES
[facility] Medicine Services  INPATIENT PROGRESS NOTE    Length of Stay: 1  Date of Admission: 8/2/2021  Primary Care Physician: Papi Mckeon MD    Subjective   Chief Complaint: Headache  HPI: Doing better, headache is better.  Denies any overt shortness of breath currently.  No other current concerns.    Review of Systems   Constitutional: Negative for activity change, chills and fever.   HENT: Negative for congestion.    Respiratory: Positive for shortness of breath. Negative for cough and wheezing.    Cardiovascular: Negative for chest pain and palpitations.   Gastrointestinal: Negative for abdominal pain, constipation, diarrhea, nausea and vomiting.   Genitourinary: Negative.    Musculoskeletal: Positive for myalgias.   Neurological: Positive for headaches.   Psychiatric/Behavioral: Negative.    All other systems reviewed and are negative.     All pertinent negatives and positives are as above. All other systems have been reviewed and are negative unless otherwise stated.     Objective    Temp:  [96.2 °F (35.7 °C)-98.3 °F (36.8 °C)] 96.9 °F (36.1 °C)  Heart Rate:  [60-75] 71  Resp:  [16-20] 18  BP: (102-138)/(50-73) 120/58    Physical Exam  Constitutional:       Comments: Appears in mild distress   HENT:      Head: Normocephalic and atraumatic.      Right Ear: External ear normal.      Left Ear: External ear normal.      Nose: Nose normal.      Mouth/Throat:      Mouth: Mucous membranes are moist.      Pharynx: Oropharynx is clear.   Eyes:      Conjunctiva/sclera: Conjunctivae normal.   Cardiovascular:      Rate and Rhythm: Normal rate and regular rhythm.      Pulses: Normal pulses.      Heart sounds: Normal heart sounds.   Pulmonary:      Comments: Diminished but clear bilaterally.  Abdominal:      General: Bowel sounds are normal.      Palpations: Abdomen is soft.      Tenderness: There is no abdominal tenderness.   Musculoskeletal:         General: No  swelling.      Cervical back: Neck supple.   Skin:     General: Skin is warm and dry.      Capillary Refill: Capillary refill takes less than 2 seconds.   Neurological:      General: No focal deficit present.      Mental Status: She is alert and oriented to person, place, and time. Mental status is at baseline.      Coordination: Coordination normal.   Psychiatric:         Mood and Affect: Mood normal.         Behavior: Behavior normal.         Results Review:  I have reviewed the labs, radiology results, and diagnostic studies.    Laboratory Data:   Results from last 7 days   Lab Units 08/04/21  0623 08/03/21  0551 08/02/21  1046   SODIUM mmol/L 134* 137 133*   POTASSIUM mmol/L 3.8 4.4 4.1   CHLORIDE mmol/L 103 104 100   CO2 mmol/L 21.0* 22.0 23.0   BUN mg/dL 20 17 14   CREATININE mg/dL 0.69 0.70 0.84  0.85   GLUCOSE mg/dL 127* 147* 99   CALCIUM mg/dL 8.1* 8.5* 8.4*   BILIRUBIN mg/dL 0.2 0.2 0.2  0.2   ALK PHOS U/L 76 87 84  86   ALT (SGPT) U/L 120* 62* 25  24   AST (SGOT) U/L 94* 82* 34*  31   ANION GAP mmol/L 10.0 11.0 10.0     Estimated Creatinine Clearance: 97.2 mL/min (by C-G formula based on SCr of 0.69 mg/dL).          Results from last 7 days   Lab Units 08/04/21  0623 08/03/21  0551 08/02/21  1046   WBC 10*3/mm3 10.72 5.22 8.57   HEMOGLOBIN g/dL 13.6 14.3 14.4   HEMATOCRIT % 40.5 43.7 43.5   PLATELETS 10*3/mm3 256 222 205           Culture Data:   Blood Culture   Date Value Ref Range Status   08/02/2021 No growth at 24 hours  Preliminary   08/02/2021 No growth at 2 days  Preliminary     No results found for: URINECX  No results found for: RESPCX  No results found for: WOUNDCX  No results found for: STOOLCX  No components found for: BODYFLD    Radiology Data:   Imaging Results (Last 24 Hours)     ** No results found for the last 24 hours. **          I have reviewed the patient's current medications.     Assessment/Plan     Active Problems:    Pneumonia due to COVID-19 virus    Plan:  -Continue  supplemental oxygen wean as tolerated, ideally would like to get back down to room air prior to discharge.  -Continue Decadron  -Continue remdesivir  -Continue Covid monitoring labs  -Continue Fioricet and Toradol combination for headache relief.  This is likely related to her underlying viral illness.  -DVT prophylaxis with Lovenox  -CODE STATUS: Full    I confirmed that the patient's Advance Care Plan is present, code status is documented, or surrogate decision maker is listed in the patient's medical record.     Discharge Planning: In process    Og Morales MD

## 2021-08-04 NOTE — PLAN OF CARE
Goal Outcome Evaluation:  Plan of Care Reviewed With: patient        Progress: declining       Pt has ongoing headache, is alternating po/IV meds for discomfort.  Was on room air prior to hs.   Has required supplemental O2 first at 2 liters, then 3.5 liters, now at 4 liters per N/C to maintain SaO2 greater than 90%.   Notified Judith in RT at this time.  Remains on telemetry, NSR - see flowsheets.

## 2021-08-04 NOTE — PLAN OF CARE
Goal Outcome Evaluation:           Progress: no change  Outcome Summary: VSS. will cont to monitor

## 2021-08-05 LAB
ALBUMIN SERPL-MCNC: 3.3 G/DL (ref 3.5–5.2)
ALBUMIN/GLOB SERPL: 1.1 G/DL
ALP SERPL-CCNC: 71 U/L (ref 39–117)
ALT SERPL W P-5'-P-CCNC: 199 U/L (ref 1–33)
ANION GAP SERPL CALCULATED.3IONS-SCNC: 9 MMOL/L (ref 5–15)
AST SERPL-CCNC: 126 U/L (ref 1–32)
BASOPHILS # BLD AUTO: 0.04 10*3/MM3 (ref 0–0.2)
BASOPHILS NFR BLD AUTO: 0.3 % (ref 0–1.5)
BILIRUB CONJ SERPL-MCNC: <0.2 MG/DL (ref 0–0.3)
BILIRUB SERPL-MCNC: 0.2 MG/DL (ref 0–1.2)
BUN SERPL-MCNC: 16 MG/DL (ref 8–23)
BUN/CREAT SERPL: 24.2 (ref 7–25)
CALCIUM SPEC-SCNC: 8.4 MG/DL (ref 8.6–10.5)
CHLORIDE SERPL-SCNC: 104 MMOL/L (ref 98–107)
CO2 SERPL-SCNC: 22 MMOL/L (ref 22–29)
CREAT SERPL-MCNC: 0.66 MG/DL (ref 0.57–1)
CRP SERPL-MCNC: 2.47 MG/DL (ref 0–0.5)
D-DIMER, QUANTITATIVE (MAD,POW, STR): 591 NG/ML (FEU) (ref 0–470)
DEPRECATED RDW RBC AUTO: 46.2 FL (ref 37–54)
EOSINOPHIL # BLD AUTO: 0 10*3/MM3 (ref 0–0.4)
EOSINOPHIL NFR BLD AUTO: 0 % (ref 0.3–6.2)
ERYTHROCYTE [DISTWIDTH] IN BLOOD BY AUTOMATED COUNT: 14.1 % (ref 12.3–15.4)
FERRITIN SERPL-MCNC: 498.8 NG/ML (ref 13–150)
FIBRINOGEN PPP-MCNC: 494 MG/DL (ref 228–514)
GFR SERPL CREATININE-BSD FRML MDRD: 90 ML/MIN/1.73
GLOBULIN UR ELPH-MCNC: 3 GM/DL
GLUCOSE SERPL-MCNC: 91 MG/DL (ref 65–99)
HCT VFR BLD AUTO: 38.4 % (ref 34–46.6)
HGB BLD-MCNC: 12.9 G/DL (ref 12–15.9)
IMM GRANULOCYTES # BLD AUTO: 0.06 10*3/MM3 (ref 0–0.05)
IMM GRANULOCYTES NFR BLD AUTO: 0.5 % (ref 0–0.5)
LYMPHOCYTES # BLD AUTO: 2.53 10*3/MM3 (ref 0.7–3.1)
LYMPHOCYTES NFR BLD AUTO: 21.2 % (ref 19.6–45.3)
MCH RBC QN AUTO: 30.2 PG (ref 26.6–33)
MCHC RBC AUTO-ENTMCNC: 33.6 G/DL (ref 31.5–35.7)
MCV RBC AUTO: 89.9 FL (ref 79–97)
MONOCYTES # BLD AUTO: 0.85 10*3/MM3 (ref 0.1–0.9)
MONOCYTES NFR BLD AUTO: 7.1 % (ref 5–12)
NEUTROPHILS NFR BLD AUTO: 70.9 % (ref 42.7–76)
NEUTROPHILS NFR BLD AUTO: 8.47 10*3/MM3 (ref 1.7–7)
NRBC BLD AUTO-RTO: 0 /100 WBC (ref 0–0.2)
PLATELET # BLD AUTO: 275 10*3/MM3 (ref 140–450)
PMV BLD AUTO: 10.3 FL (ref 6–12)
POTASSIUM SERPL-SCNC: 3.7 MMOL/L (ref 3.5–5.2)
PROT SERPL-MCNC: 6.3 G/DL (ref 6–8.5)
RBC # BLD AUTO: 4.27 10*6/MM3 (ref 3.77–5.28)
SODIUM SERPL-SCNC: 135 MMOL/L (ref 136–145)
WBC # BLD AUTO: 11.95 10*3/MM3 (ref 3.4–10.8)

## 2021-08-05 PROCEDURE — 85025 COMPLETE CBC W/AUTO DIFF WBC: CPT | Performed by: HOSPITALIST

## 2021-08-05 PROCEDURE — 85379 FIBRIN DEGRADATION QUANT: CPT | Performed by: HOSPITALIST

## 2021-08-05 PROCEDURE — 25010000002 ENOXAPARIN PER 10 MG: Performed by: HOSPITALIST

## 2021-08-05 PROCEDURE — 85384 FIBRINOGEN ACTIVITY: CPT | Performed by: HOSPITALIST

## 2021-08-05 PROCEDURE — 82248 BILIRUBIN DIRECT: CPT | Performed by: HOSPITALIST

## 2021-08-05 PROCEDURE — 25010000002 METOCLOPRAMIDE PER 10 MG: Performed by: HOSPITALIST

## 2021-08-05 PROCEDURE — 86140 C-REACTIVE PROTEIN: CPT | Performed by: HOSPITALIST

## 2021-08-05 PROCEDURE — 80053 COMPREHEN METABOLIC PANEL: CPT | Performed by: HOSPITALIST

## 2021-08-05 PROCEDURE — 25010000002 KETOROLAC TROMETHAMINE PER 15 MG: Performed by: HOSPITALIST

## 2021-08-05 PROCEDURE — 82728 ASSAY OF FERRITIN: CPT | Performed by: HOSPITALIST

## 2021-08-05 PROCEDURE — 25010000002 DEXAMETHASONE PER 1 MG: Performed by: HOSPITALIST

## 2021-08-05 RX ADMIN — THERA TABS 1 TABLET: TAB at 10:06

## 2021-08-05 RX ADMIN — BUTALBITAL, ACETAMINOPHEN, AND CAFFEINE 1 TABLET: 50; 325; 40 TABLET ORAL at 10:05

## 2021-08-05 RX ADMIN — BUTALBITAL, ACETAMINOPHEN, AND CAFFEINE 1 TABLET: 50; 325; 40 TABLET ORAL at 22:00

## 2021-08-05 RX ADMIN — BUTALBITAL, ACETAMINOPHEN, AND CAFFEINE 1 TABLET: 50; 325; 40 TABLET ORAL at 15:42

## 2021-08-05 RX ADMIN — KETOROLAC TROMETHAMINE 15 MG: 15 INJECTION, SOLUTION INTRAMUSCULAR; INTRAVENOUS at 17:32

## 2021-08-05 RX ADMIN — METOCLOPRAMIDE 5 MG: 5 INJECTION, SOLUTION INTRAMUSCULAR; INTRAVENOUS at 20:28

## 2021-08-05 RX ADMIN — DULOXETINE HYDROCHLORIDE 60 MG: 60 CAPSULE, DELAYED RELEASE ORAL at 10:05

## 2021-08-05 RX ADMIN — DEXAMETHASONE SODIUM PHOSPHATE 6 MG: 4 INJECTION, SOLUTION INTRAMUSCULAR; INTRAVENOUS at 10:06

## 2021-08-05 RX ADMIN — METOCLOPRAMIDE 5 MG: 5 INJECTION, SOLUTION INTRAMUSCULAR; INTRAVENOUS at 17:32

## 2021-08-05 RX ADMIN — ENOXAPARIN SODIUM 40 MG: 40 INJECTION SUBCUTANEOUS at 20:27

## 2021-08-05 RX ADMIN — Medication 6 MG: at 20:27

## 2021-08-05 RX ADMIN — REMDESIVIR 100 MG: 100 INJECTION, POWDER, LYOPHILIZED, FOR SOLUTION INTRAVENOUS at 21:16

## 2021-08-05 RX ADMIN — METOCLOPRAMIDE 5 MG: 5 INJECTION, SOLUTION INTRAMUSCULAR; INTRAVENOUS at 10:06

## 2021-08-05 NOTE — PROGRESS NOTES
[facility] Medicine Services  INPATIENT PROGRESS NOTE    Length of Stay: 2  Date of Admission: 8/2/2021  Primary Care Physician: Papi Mckeon MD    Subjective   Chief Complaint: Headache  HPI: Overall improved but headache some worse today.  Desats on RA.  Currently wearing 3.5 LPM. Denies any difficulties with SOA.     Review of Systems   Constitutional: Negative for activity change, chills and fever.   HENT: Negative for congestion.    Respiratory: Positive for shortness of breath. Negative for cough and wheezing.    Cardiovascular: Negative for chest pain and palpitations.   Gastrointestinal: Negative for abdominal pain, constipation, diarrhea, nausea and vomiting.   Genitourinary: Negative.    Musculoskeletal: Positive for myalgias.   Neurological: Positive for headaches.   Psychiatric/Behavioral: Negative.    All other systems reviewed and are negative.     All pertinent negatives and positives are as above. All other systems have been reviewed and are negative unless otherwise stated.     Objective    Temp:  [96.2 °F (35.7 °C)-98.2 °F (36.8 °C)] 97.7 °F (36.5 °C)  Heart Rate:  [56-76] 68  Resp:  [16-20] 18  BP: (120-146)/(58-76) 120/65    Physical Exam  Constitutional:       Comments: Appears in mild distress   HENT:      Head: Normocephalic and atraumatic.      Right Ear: External ear normal.      Left Ear: External ear normal.      Nose: Nose normal.      Mouth/Throat:      Mouth: Mucous membranes are moist.      Pharynx: Oropharynx is clear.   Eyes:      Conjunctiva/sclera: Conjunctivae normal.   Cardiovascular:      Rate and Rhythm: Normal rate and regular rhythm.      Pulses: Normal pulses.      Heart sounds: Normal heart sounds.   Pulmonary:      Comments: Diminished but clear bilaterally.  Abdominal:      General: Bowel sounds are normal.      Palpations: Abdomen is soft.      Tenderness: There is no abdominal tenderness.   Musculoskeletal:          General: No swelling.      Cervical back: Neck supple.   Skin:     General: Skin is warm and dry.      Capillary Refill: Capillary refill takes less than 2 seconds.   Neurological:      General: No focal deficit present.      Mental Status: She is alert and oriented to person, place, and time. Mental status is at baseline.      Coordination: Coordination normal.   Psychiatric:         Mood and Affect: Mood normal.         Behavior: Behavior normal.         Results Review:  I have reviewed the labs, radiology results, and diagnostic studies.    Laboratory Data:   Results from last 7 days   Lab Units 08/05/21  0715 08/04/21  0623 08/03/21  0551   SODIUM mmol/L 135* 134* 137   POTASSIUM mmol/L 3.7 3.8 4.4   CHLORIDE mmol/L 104 103 104   CO2 mmol/L 22.0 21.0* 22.0   BUN mg/dL 16 20 17   CREATININE mg/dL 0.66 0.69 0.70   GLUCOSE mg/dL 91 127* 147*   CALCIUM mg/dL 8.4* 8.1* 8.5*   BILIRUBIN mg/dL 0.2 0.2 0.2   ALK PHOS U/L 71 76 87   ALT (SGPT) U/L 199* 120* 62*   AST (SGOT) U/L 126* 94* 82*   ANION GAP mmol/L 9.0 10.0 11.0     Estimated Creatinine Clearance: 101.6 mL/min (by C-G formula based on SCr of 0.66 mg/dL).          Results from last 7 days   Lab Units 08/05/21  0715 08/04/21  0623 08/03/21  0551 08/02/21  1046   WBC 10*3/mm3 11.95* 10.72 5.22 8.57   HEMOGLOBIN g/dL 12.9 13.6 14.3 14.4   HEMATOCRIT % 38.4 40.5 43.7 43.5   PLATELETS 10*3/mm3 275 256 222 205           Culture Data:   Blood Culture   Date Value Ref Range Status   08/02/2021 No growth at 2 days  Preliminary   08/02/2021 No growth at 2 days  Preliminary     No results found for: URINECX  No results found for: RESPCX  No results found for: WOUNDCX  No results found for: STOOLCX  No components found for: BODYFLD    Radiology Data:   Imaging Results (Last 24 Hours)     ** No results found for the last 24 hours. **          I have reviewed the patient's current medications.     Assessment/Plan     Active Problems:    Pneumonia due to COVID-19  virus    Plan:  -Continue supplemental oxygen wean as tolerated, ideally would like to get back down to room air prior to discharge. Discussed the possibility of d/c home with O2 if required going forward.   -Continue Decadron  -Continue remdesivir  -Continue Covid monitoring labs  -Continue Fioricet and Toradol combination for headache relief.  Headache related to viral illness.   -DVT prophylaxis with Lovenox  -CODE STATUS: Full    I confirmed that the patient's Advance Care Plan is present, code status is documented, or surrogate decision maker is listed in the patient's medical record.     Discharge Planning: In process, hopefully home in 1-2 days.     Og Morales MD

## 2021-08-05 NOTE — PLAN OF CARE
Goal Outcome Evaluation:  Plan of Care Reviewed With: patient        Progress: improving  Outcome Summary: Patient continues to c/o headache. Patient received prn fioricet, medication effective. No further issues noted.

## 2021-08-05 NOTE — PLAN OF CARE
Goal Outcome Evaluation:  Plan of Care Reviewed With: patient        Progress: improving  Outcome Summary: Patient received sitting up in bed. VS taken stable states that she feels much better. Medicated X1 for pain. Slept well throughout the night. will continue to monitor.

## 2021-08-06 ENCOUNTER — READMISSION MANAGEMENT (OUTPATIENT)
Dept: CALL CENTER | Facility: HOSPITAL | Age: 61
End: 2021-08-06

## 2021-08-06 ENCOUNTER — NURSE TRIAGE (OUTPATIENT)
Dept: CALL CENTER | Facility: HOSPITAL | Age: 61
End: 2021-08-06

## 2021-08-06 VITALS
RESPIRATION RATE: 16 BRPM | DIASTOLIC BLOOD PRESSURE: 66 MMHG | TEMPERATURE: 98.1 F | BODY MASS INDEX: 41.02 KG/M2 | HEIGHT: 63 IN | HEART RATE: 80 BPM | WEIGHT: 231.5 LBS | SYSTOLIC BLOOD PRESSURE: 134 MMHG | OXYGEN SATURATION: 93 %

## 2021-08-06 LAB
ALBUMIN SERPL-MCNC: 3.6 G/DL (ref 3.5–5.2)
ALBUMIN/GLOB SERPL: 1.2 G/DL
ALP SERPL-CCNC: 77 U/L (ref 39–117)
ALT SERPL W P-5'-P-CCNC: 146 U/L (ref 1–33)
ANION GAP SERPL CALCULATED.3IONS-SCNC: 10 MMOL/L (ref 5–15)
AST SERPL-CCNC: 56 U/L (ref 1–32)
BASOPHILS # BLD AUTO: 0.02 10*3/MM3 (ref 0–0.2)
BASOPHILS NFR BLD AUTO: 0.2 % (ref 0–1.5)
BILIRUB SERPL-MCNC: 0.3 MG/DL (ref 0–1.2)
BUN SERPL-MCNC: 14 MG/DL (ref 8–23)
BUN/CREAT SERPL: 21.5 (ref 7–25)
CALCIUM SPEC-SCNC: 8.3 MG/DL (ref 8.6–10.5)
CHLORIDE SERPL-SCNC: 104 MMOL/L (ref 98–107)
CO2 SERPL-SCNC: 25 MMOL/L (ref 22–29)
CREAT SERPL-MCNC: 0.65 MG/DL (ref 0.57–1)
CRP SERPL-MCNC: 9.21 MG/DL (ref 0–0.5)
DEPRECATED RDW RBC AUTO: 48.1 FL (ref 37–54)
EOSINOPHIL # BLD AUTO: 0.02 10*3/MM3 (ref 0–0.4)
EOSINOPHIL NFR BLD AUTO: 0.2 % (ref 0.3–6.2)
ERYTHROCYTE [DISTWIDTH] IN BLOOD BY AUTOMATED COUNT: 14.5 % (ref 12.3–15.4)
FERRITIN SERPL-MCNC: 461.2 NG/ML (ref 13–150)
GFR SERPL CREATININE-BSD FRML MDRD: 92 ML/MIN/1.73
GLOBULIN UR ELPH-MCNC: 2.9 GM/DL
GLUCOSE SERPL-MCNC: 80 MG/DL (ref 65–99)
HCT VFR BLD AUTO: 39.3 % (ref 34–46.6)
HGB BLD-MCNC: 13.3 G/DL (ref 12–15.9)
IMM GRANULOCYTES # BLD AUTO: 0.11 10*3/MM3 (ref 0–0.05)
IMM GRANULOCYTES NFR BLD AUTO: 0.9 % (ref 0–0.5)
LYMPHOCYTES # BLD AUTO: 3.19 10*3/MM3 (ref 0.7–3.1)
LYMPHOCYTES NFR BLD AUTO: 25.4 % (ref 19.6–45.3)
MCH RBC QN AUTO: 31 PG (ref 26.6–33)
MCHC RBC AUTO-ENTMCNC: 33.8 G/DL (ref 31.5–35.7)
MCV RBC AUTO: 91.6 FL (ref 79–97)
MONOCYTES # BLD AUTO: 1.11 10*3/MM3 (ref 0.1–0.9)
MONOCYTES NFR BLD AUTO: 8.9 % (ref 5–12)
NEUTROPHILS NFR BLD AUTO: 64.4 % (ref 42.7–76)
NEUTROPHILS NFR BLD AUTO: 8.09 10*3/MM3 (ref 1.7–7)
NRBC BLD AUTO-RTO: 0 /100 WBC (ref 0–0.2)
PLATELET # BLD AUTO: 301 10*3/MM3 (ref 140–450)
PMV BLD AUTO: 10.5 FL (ref 6–12)
POTASSIUM SERPL-SCNC: 3.8 MMOL/L (ref 3.5–5.2)
PROT SERPL-MCNC: 6.5 G/DL (ref 6–8.5)
RBC # BLD AUTO: 4.29 10*6/MM3 (ref 3.77–5.28)
SODIUM SERPL-SCNC: 139 MMOL/L (ref 136–145)
WBC # BLD AUTO: 12.54 10*3/MM3 (ref 3.4–10.8)

## 2021-08-06 PROCEDURE — 25010000002 DEXAMETHASONE PER 1 MG: Performed by: HOSPITALIST

## 2021-08-06 PROCEDURE — 94760 N-INVAS EAR/PLS OXIMETRY 1: CPT

## 2021-08-06 PROCEDURE — 25010000002 METOCLOPRAMIDE PER 10 MG: Performed by: HOSPITALIST

## 2021-08-06 PROCEDURE — 80053 COMPREHEN METABOLIC PANEL: CPT | Performed by: HOSPITALIST

## 2021-08-06 PROCEDURE — 82728 ASSAY OF FERRITIN: CPT | Performed by: HOSPITALIST

## 2021-08-06 PROCEDURE — 86140 C-REACTIVE PROTEIN: CPT | Performed by: HOSPITALIST

## 2021-08-06 PROCEDURE — 85025 COMPLETE CBC W/AUTO DIFF WBC: CPT | Performed by: HOSPITALIST

## 2021-08-06 PROCEDURE — 25010000002 KETOROLAC TROMETHAMINE PER 15 MG: Performed by: HOSPITALIST

## 2021-08-06 RX ORDER — DEXAMETHASONE 6 MG/1
6 TABLET ORAL DAILY
Qty: 5 TABLET | Refills: 0 | Status: SHIPPED | OUTPATIENT
Start: 2021-08-06 | End: 2021-08-16 | Stop reason: HOSPADM

## 2021-08-06 RX ORDER — BUTALBITAL, ACETAMINOPHEN AND CAFFEINE 50; 325; 40 MG/1; MG/1; MG/1
1 TABLET ORAL EVERY 6 HOURS PRN
Qty: 12 TABLET | Refills: 0 | Status: SHIPPED | OUTPATIENT
Start: 2021-08-06 | End: 2021-08-06

## 2021-08-06 RX ORDER — BUTALBITAL, ACETAMINOPHEN AND CAFFEINE 50; 325; 40 MG/1; MG/1; MG/1
1 TABLET ORAL EVERY 6 HOURS PRN
Qty: 12 TABLET | Refills: 0 | Status: SHIPPED | OUTPATIENT
Start: 2021-08-06 | End: 2021-08-16 | Stop reason: HOSPADM

## 2021-08-06 RX ORDER — DEXAMETHASONE 6 MG/1
6 TABLET ORAL DAILY
Qty: 5 TABLET | Refills: 0 | Status: SHIPPED | OUTPATIENT
Start: 2021-08-06 | End: 2021-08-06

## 2021-08-06 RX ADMIN — DEXAMETHASONE SODIUM PHOSPHATE 6 MG: 4 INJECTION, SOLUTION INTRAMUSCULAR; INTRAVENOUS at 12:38

## 2021-08-06 RX ADMIN — KETOROLAC TROMETHAMINE 15 MG: 15 INJECTION, SOLUTION INTRAMUSCULAR; INTRAVENOUS at 12:39

## 2021-08-06 RX ADMIN — THERA TABS 1 TABLET: TAB at 12:38

## 2021-08-06 RX ADMIN — METOCLOPRAMIDE 5 MG: 5 INJECTION, SOLUTION INTRAMUSCULAR; INTRAVENOUS at 12:39

## 2021-08-06 RX ADMIN — KETOROLAC TROMETHAMINE 15 MG: 15 INJECTION, SOLUTION INTRAMUSCULAR; INTRAVENOUS at 05:42

## 2021-08-06 RX ADMIN — DULOXETINE HYDROCHLORIDE 60 MG: 60 CAPSULE, DELAYED RELEASE ORAL at 12:38

## 2021-08-06 NOTE — PLAN OF CARE
Goal Outcome Evaluation:  Plan of Care Reviewed With: patient        Progress: improving  Outcome Summary: Patient received lying in bed awake alert and oriented. Medicated X2 for headache stating that it is better than yesterday. rested quietly throughout the night. condition remains stable. will continue to monitor.

## 2021-08-06 NOTE — OUTREACH NOTE
Prep Survey      Responses   [facility] patient discharged from?  [place]   Is LACE score < 7 ?  No   Emergency Room discharge w/ pulse ox?  No   Eligibility  [facility]   Date of Admission  08/02/21   Date of Discharge  08/06/21   Discharge Disposition  Home or Self Care   Discharge diagnosis  PN r/t covid 19   Does the patient have one of the following disease processes/diagnoses(primary or secondary)?  COVID-19   Does the patient have Home health ordered?  No   Is there a DME ordered?  No   Prep survey completed?  Yes          Wilda Victor RN

## 2021-08-06 NOTE — DISCHARGE SUMMARY
[facility] Medicine Services  DISCHARGE SUMMARY       Date of Admission: 8/2/2021  Date of Discharge:  8/6/2021  Primary Care Physician: Papi Mckeon MD    Presenting Problem/History of Present Illness:  Pneumonia due to COVID-19 virus [U07.1, J12.82]     Final Discharge Diagnoses:  Active Hospital Problems    Diagnosis    • Pneumonia due to COVID-19 virus        Consults:   Consults     No orders found from 7/4/2021 to 8/3/2021.          Procedures Performed:                 Pertinent Test Results:   Lab Results (most recent)     Procedure Component Value Units Date/Time    Ferritin [[id number]]  (Abnormal) Collected: 08/06/21 0548    Specimen: Blood Updated: 08/06/21 0654     Ferritin 461.20 ng/mL     Narrative:      Results may be falsely decreased if patient taking Biotin.      Comprehensive Metabolic Panel [[id number]]  (Abnormal) Collected: 08/06/21 0548    Specimen: Blood Updated: 08/06/21 0651     Glucose 80 mg/dL      BUN 14 mg/dL      Creatinine 0.65 mg/dL      Sodium 139 mmol/L      Potassium 3.8 mmol/L      Chloride 104 mmol/L      CO2 25.0 mmol/L      Calcium 8.3 mg/dL      Total Protein 6.5 g/dL      Albumin 3.60 g/dL      ALT (SGPT) 146 U/L      AST (SGOT) 56 U/L      Alkaline Phosphatase 77 U/L      Total Bilirubin 0.3 mg/dL      eGFR Non African Amer 92 mL/min/1.73      Globulin 2.9 gm/dL      A/G Ratio 1.2 g/dL      BUN/Creatinine Ratio 21.5     Anion Gap 10.0 mmol/L     Narrative:      GFR Normal >60  Chronic Kidney Disease <60  Kidney Failure <15      C-reactive Protein [[id number]]  (Abnormal) Collected: 08/06/21 0548    Specimen: Blood Updated: 08/06/21 0651     C-Reactive Protein 9.21 mg/dL     CBC & Differential [[id number]]  (Abnormal) Collected: 08/06/21 0548    Specimen: Blood Updated: 08/06/21 0626    Narrative:      The following orders were created for panel order CBC & Differential.  Procedure                               Abnormality          Status                     ---------                               -----------         ------                     CBC Auto Differential[[id number]]        Abnormal            Final result                 Please view results for these tests on the individual orders.    CBC Auto Differential [[id number]]  (Abnormal) Collected: 08/06/21 0548    Specimen: Blood Updated: 08/06/21 0626     WBC 12.54 10*3/mm3      RBC 4.29 10*6/mm3      Hemoglobin 13.3 g/dL      Hematocrit 39.3 %      MCV 91.6 fL      MCH 31.0 pg      MCHC 33.8 g/dL      RDW 14.5 %      RDW-SD 48.1 fl      MPV 10.5 fL      Platelets 301 10*3/mm3      Neutrophil % 64.4 %      Lymphocyte % 25.4 %      Monocyte % 8.9 %      Eosinophil % 0.2 %      Basophil % 0.2 %      Immature Grans % 0.9 %      Neutrophils, Absolute 8.09 10*3/mm3      Lymphocytes, Absolute 3.19 10*3/mm3      Monocytes, Absolute 1.11 10*3/mm3      Eosinophils, Absolute 0.02 10*3/mm3      Basophils, Absolute 0.02 10*3/mm3      Immature Grans, Absolute 0.11 10*3/mm3      nRBC 0.0 /100 WBC     Blood Culture - Blood, Arm, Left [[id number]] Collected: 08/02/21 1901    Specimen: Blood from Arm, Left Updated: 08/05/21 1915     Blood Culture No growth at 3 days    Blood Culture - Blood, Arm, Right [[id number]] Collected: 08/02/21 1437    Specimen: Blood from Arm, Right Updated: 08/05/21 1500     Blood Culture No growth at 3 days    Ferritin [[id number]]  (Abnormal) Collected: 08/05/21 0715    Specimen: Blood Updated: 08/05/21 0757     Ferritin 498.80 ng/mL     Narrative:      Results may be falsely decreased if patient taking Biotin.      Comprehensive Metabolic Panel [[id number]]  (Abnormal) Collected: 08/05/21 0715    Specimen: Blood Updated: 08/05/21 0745     Glucose 91 mg/dL      BUN 16 mg/dL      Creatinine 0.66 mg/dL      Sodium 135 mmol/L      Potassium 3.7 mmol/L      Chloride 104 mmol/L      CO2 22.0 mmol/L      Calcium 8.4 mg/dL      Total Protein 6.3 g/dL      Albumin 3.30 g/dL      ALT  (SGPT) 199 U/L      AST (SGOT) 126 U/L      Alkaline Phosphatase 71 U/L      Total Bilirubin 0.2 mg/dL      eGFR Non African Amer 90 mL/min/1.73      Globulin 3.0 gm/dL      A/G Ratio 1.1 g/dL      BUN/Creatinine Ratio 24.2     Anion Gap 9.0 mmol/L     Narrative:      GFR Normal >60  Chronic Kidney Disease <60  Kidney Failure <15      Bilirubin, Direct [[id number]]  (Normal) Collected: 08/05/21 0715    Specimen: Blood Updated: 08/05/21 0745     Bilirubin, Direct <0.2 mg/dL     C-reactive Protein [[id number]]  (Abnormal) Collected: 08/05/21 0715    Specimen: Blood Updated: 08/05/21 0745     C-Reactive Protein 2.47 mg/dL     Fibrinogen [[id number]]  (Normal) Collected: 08/05/21 0715    Specimen: Blood Updated: 08/05/21 0738     Fibrinogen 494 mg/dL     D-dimer, Quantitative [[id number]]  (Abnormal) Collected: 08/05/21 0715    Specimen: Blood Updated: 08/05/21 0737     D-Dimer, Quantitative 591 ng/mL (FEU)     Narrative:      Dimer values <500 ng/ml FEU are FDA approved as aid in diagnosis of deep venous thrombosis and pulmonary embolism.  This test should not be used in an exclusion strategy with pretest probability alone.    A recent guideline regarding diagnosis for pulmonary thromboembolism recommends an adjusted exclusion criterion of age x 10 ng/ml FEU for patients >50 years of age ([employer] 2015; 163: 701-711).      CBC & Differential [[id number]]  (Abnormal) Collected: 08/05/21 0715    Specimen: Blood Updated: 08/05/21 0719    Narrative:      The following orders were created for panel order CBC & Differential.  Procedure                               Abnormality         Status                     ---------                               -----------         ------                     CBC Auto Differential[[id number]]        Abnormal            Final result                 Please view results for these tests on the individual orders.    CBC Auto Differential [[id number]]  (Abnormal) Collected: 08/05/21 0715     Specimen: Blood Updated: 08/05/21 0719     WBC 11.95 10*3/mm3      RBC 4.27 10*6/mm3      Hemoglobin 12.9 g/dL      Hematocrit 38.4 %      MCV 89.9 fL      MCH 30.2 pg      MCHC 33.6 g/dL      RDW 14.1 %      RDW-SD 46.2 fl      MPV 10.3 fL      Platelets 275 10*3/mm3      Neutrophil % 70.9 %      Lymphocyte % 21.2 %      Monocyte % 7.1 %      Eosinophil % 0.0 %      Basophil % 0.3 %      Immature Grans % 0.5 %      Neutrophils, Absolute 8.47 10*3/mm3      Lymphocytes, Absolute 2.53 10*3/mm3      Monocytes, Absolute 0.85 10*3/mm3      Eosinophils, Absolute 0.00 10*3/mm3      Basophils, Absolute 0.04 10*3/mm3      Immature Grans, Absolute 0.06 10*3/mm3      nRBC 0.0 /100 WBC     Bilirubin, Direct [[id number]]  (Normal) Collected: 08/04/21 0623    Specimen: Blood Updated: 08/04/21 0710     Bilirubin, Direct <0.2 mg/dL     D-dimer, Quantitative [[id number]]  (Abnormal) Collected: 08/03/21 0551    Specimen: Blood Updated: 08/03/21 0745     D-Dimer, Quantitative 1,008 ng/mL (FEU)     Narrative:      Dimer values <500 ng/ml FEU are FDA approved as aid in diagnosis of deep venous thrombosis and pulmonary embolism.  This test should not be used in an exclusion strategy with pretest probability alone.    A recent guideline regarding diagnosis for pulmonary thromboembolism recommends an adjusted exclusion criterion of age x 10 ng/ml FEU for patients >50 years of age ([employer] 2015; 163: 701-711).      Fibrinogen [[id number]]  (Abnormal) Collected: 08/03/21 0551    Specimen: Blood Updated: 08/03/21 0743     Fibrinogen 679 mg/dL     Manual Differential [[id number]]  (Abnormal) Collected: 08/03/21 0551    Specimen: Blood Updated: 08/03/21 0739     Neutrophil % 50.0 %      Lymphocyte % 32.0 %      Monocyte % 6.0 %      Basophil % 1.0 %      Bands %  10.0 %      Atypical Lymphocyte % 1.0 %      Neutrophils Absolute 3.13 10*3/mm3      Lymphocytes Absolute 1.72 10*3/mm3      Monocytes Absolute 0.31 10*3/mm3      Basophils Absolute  "0.05 10*3/mm3      RBC Morphology Normal     WBC Morphology Normal     Platelet Estimate Adequate    Procalcitonin [[id number]]  (Normal) Collected: 08/02/21 1046    Specimen: Blood Updated: 08/02/21 2048     Procalcitonin 0.10 ng/mL     Narrative:      As a Marker for Sepsis (Non-Neonates):     1. <0.5 ng/mL represents a low risk of severe sepsis and/or septic shock.  2. >2 ng/mL represents a high risk of severe sepsis and/or septic shock.    As a Marker for Lower Respiratory Tract Infections that require antibiotic therapy:  PCT on Admission     Antibiotic Therapy             6-12 Hrs later  >0.5                          Strongly Recommended            >0.25 - <0.5             Recommended  0.1 - 0.25                  Discouraged                       Remeasure/reassess PCT  <0.1                         Strongly Discouraged         Remeasure/reassess PCT      As 28 day mortality risk marker: \"Change in Procalcitonin Result\" (>80% or <=80%) if Day 0 (or Day 1) and Day 4 values are available. Refer to http://www.[employer]-pct-calculator.com/    Change in PCT <=80 %   A decrease of PCT levels below or equal to 80% defines a positive change in PCT test result representing a higher risk for 28-day all-cause mortality of patients diagnosed with severe sepsis or septic shock.    Change in PCT >80 %   A decrease of PCT levels of more than 80% defines a negative change in PCT result representing a lower risk for 28-day all-cause mortality of patients diagnosed with severe sepsis or septic shock.              Results may be falsely decreased if patient taking Biotin.     Creatinine, Serum [[id number]]  (Normal) Collected: 08/02/21 1046    Specimen: Blood Updated: 08/02/21 1946     Creatinine 0.84 mg/dL      eGFR Non African Amer 68 mL/min/1.73     Narrative:      GFR Normal >60  Chronic Kidney Disease <60  Kidney Failure <15      Hepatic Function Panel [[id number]]  (Abnormal) Collected: 08/02/21 1046    Specimen: Blood Updated: " 08/02/21 1946     Total Protein 7.1 g/dL      Albumin 3.90 g/dL      ALT (SGPT) 25 U/L      AST (SGOT) 34 U/L      Alkaline Phosphatase 84 U/L      Total Bilirubin 0.2 mg/dL      Bilirubin, Direct <0.2 mg/dL      Bilirubin, Indirect --     Comment: Unable to calculate       COVID-19 and FLU A/B PCR - Swab, Nasopharynx [[id number]]  (Abnormal) Collected: 08/02/21 1055    Specimen: Swab from Nasopharynx Updated: 08/02/21 1220     COVID19 Detected     Comment: Enhanced Precautions Requested          Influenza A PCR Not Detected     Influenza B PCR Not Detected    Narrative:      Fact sheet for providers: https://www.fda.gov/media/363819/download    Fact sheet for patients: https://www.fda.gov/media/748340/download    Test performed by PCR.  Influenza A and Influenza B negative results should be considered presumptive in samples that have a positive SARS-CoV-2 result.    Competitive inhibition studies showed that SARS-CoV-2 virus, when present at concentrations above 3.6E+04 copies/mL, can inhibit the detection and amplification of influenza A and influenza B virus RNA if present at or below 1.8E+02 copies/mL or 4.9E+02 copies/mL, respectively, and may lead to false negative influenza virus results. If co-infection with influenza A or influenza B virus is suspected in samples with a positive SARS-CoV-2 result, the sample should be re-tested with another FDA cleared, approved, or authorized influenza test, if influenza virus detection would change clinical management.        Imaging Results (Most Recent)     Procedure Component Value Units Date/Time    CT Head Without Contrast [[id number]] Collected: 08/02/21 1020     Updated: 08/02/21 1056    Narrative:      EXAMINATION:  CT SCAN OF THE HEAD WITHOUT INTRAVENOUS CONTRAST    CLINICAL INFORMATION:  worse headache of life x 1 week    This exam was performed using radiation doses that are as low as  reasonably achievable (ALARA).  This exam was performed according to our  departmental dose  optimization program, which includes automated exposure control,  adjustment of the mA and/or KV according to patient size and/or  use of iterative reconstruction technique.    COMPARISON: None available.    TECHNIQUE:  Axial images from skull base to vertex.        FINDINGS:      There is no evidence of intracranial hemorrhage, parenchymal  mass, midline shift, or focal mass effect.  There is no hydrocephalus or effacement of the basilar cisterns.    There is no extra-axial hemorrhage or collection identified.    The mastoid air cells and visualized paranasal sinuses appear  clear.          Impression:      No evidence of intracranial hemorrhage, mass effect or large  acute infarct.      Electronically signed by:  Pete Rivers MD  8/2/2021 10:55 AM CDT  Workstation: [id number]    XR Chest 1 View [[id number]] Collected: 08/02/21 0943     Updated: 08/02/21 0959    Narrative:      EXAM DESCRIPTION:     XR CHEST 1 VW    CLINICAL HISTORY:     61 years  Female -  cough    COMPARISON:     March 8, 2019    TECHNIQUE:     One view-AP portable radiograph the chest    FINDINGS:     There are bilateral peripheral infiltrates compatible with viral  pneumonia 19. Appropriate testing is recommended. The cardiac  silhouette and pulmonary vasculature are within normal limits.  There are no pleural effusions.      Impression:          1. Unfavorable change with interval development of bilateral  peripheral groundglass opacities indicative of viral pneumonia  19. Appropriate testing is recommended.    Commonly reported imaging features of COVID-19 pneumonia are  present. Other processes such as influenza pneumonia and  organizing pneumonia, as can be seen with drug toxicity and  connective tissue disease, can cause similar imaging pattern.  [PneTyp]        Electronically signed by:  Mia Diaz MD  8/2/2021 9:58 AM CDT  Workstation: [id number]          Chief Complaint on Day of Discharge: No  "complaints    Hospital Course:  The patient is a 61 y.o. female with medical history notable for anxiety and migraines who presented to [facility] with worsening cough and headache.  Patient had viral symptoms consistent with COVID-19 and tested positive for COVID-19.  She was admitted and noted to have hypoxia so she was given supplemental oxygen.  Patient was started on remdesivir and Decadron.  Patient was able to be weaned off of supplemental oxygen and had improvement in her symptoms.  Patient was instructed she would need to finish her quarantine at home and she will be discharged home to follow-up with PCP as an outpatient.  She will be provided with oral Decadron and a short supply of Fioricet for any ongoing headaches.  Patient voiced understanding agreement to the plan.    Condition on Discharge: Stable    Physical Exam on Discharge:  /80 (BP Location: Right arm, Patient Position: Sitting)   Pulse 79   Temp 98.1 °F (36.7 °C) (Oral)   Resp 18   Ht 160 cm (63\")   Wt 105 kg (231 lb 8 oz)   LMP 07/02/2002 (Within Months)   SpO2 90% Comment: walking  BMI 41.01 kg/m²   Physical Exam  Constitutional:       General: She is not in acute distress.     Appearance: She is not toxic-appearing.   HENT:      Head: Normocephalic.      Right Ear: External ear normal.      Left Ear: External ear normal.      Nose: Nose normal.      Mouth/Throat:      Mouth: Mucous membranes are moist.      Pharynx: Oropharynx is clear.   Eyes:      Conjunctiva/sclera: Conjunctivae normal.   Cardiovascular:      Rate and Rhythm: Normal rate and regular rhythm.      Pulses: Normal pulses.      Heart sounds: Normal heart sounds.   Pulmonary:      Effort: Pulmonary effort is normal. No respiratory distress.      Breath sounds: Normal breath sounds.   Abdominal:      General: Bowel sounds are normal.      Palpations: Abdomen is soft.      Tenderness: There is no abdominal tenderness.   Musculoskeletal:         " General: No swelling.      Cervical back: Neck supple.   Skin:     General: Skin is warm and dry.      Capillary Refill: Capillary refill takes less than 2 seconds.   Neurological:      General: No focal deficit present.      Mental Status: She is alert and oriented to person, place, and time. Mental status is at baseline.      Coordination: Coordination normal.   Psychiatric:         Mood and Affect: Mood normal.         Behavior: Behavior normal.         Discharge Disposition:  Home or Self Care    Discharge Medications:     Discharge Medications      New Medications      Instructions Start Date   butalbital-acetaminophen-caffeine -40 MG per tablet  Commonly known as: FIORICET, ESGIC   1 tablet, Oral, Every 6 Hours PRN      dexamethasone 6 MG tablet  Commonly known as: DECADRON   6 mg, Oral, Daily         Continue These Medications      Instructions Start Date   DULoxetine 60 MG capsule  Commonly known as: CYMBALTA   60 mg, Oral, Daily      multivitamin tablet tablet  Commonly known as: THERAGRAN   1 tablet, Oral, Daily      SELENIMIN PO   1 tablet, Oral, Daily      vitamin C 500 MG tablet  Commonly known as: ASCORBIC ACID   500 mg, Oral, Daily, Time released       VITAMIN D PO   2,000 Units, Oral, Daily             Discharge Diet:   Diet Instructions     Diet: Regular      Discharge Diet: Regular          Activity at Discharge:   Activity Instructions     Activity as Tolerated            Discharge Care Plan/Instructions: Take medications as prescribed, follow-up with PCP, finish quarantine at home, return for worsening symptoms.      Follow-up Appointments:   Future Appointments   Date Time Provider Department Center   8/17/2021 10:00 AM Papi Mckeon MD [facility]       Test Results Pending at Discharge:   Pending Labs     Order Current Status    Blood Culture - Blood, Arm, Left Preliminary result    Blood Culture - Blood, Arm, Right Preliminary result          Og Morales MD    Time: 32  minutes

## 2021-08-06 NOTE — TELEPHONE ENCOUNTER
Medication which ws sent to the pharmacy is closed, because of covid, Needing med sent to the other pharmacy. Spoke with CARMENZA Medina who will send med to [facility]. Med is Forcet. Informed the caller    Reason for Disposition  • [1] Caller has URGENT medicine question about med that PCP or specialist prescribed AND [2] triager unable to answer question    Additional Information  • Negative: [1] Intentional drug overdose AND [2] suicidal thoughts or ideas  • Negative: Drug overdose and triager unable to answer question  • Negative: Caller requesting information unrelated to medicine  • Negative: Caller requesting information about COVID-19 Vaccine  • Negative: Caller requesting information about Emergency Contraception  • Negative: Caller requesting information about Combined Birth Control Pills  • Negative: Caller requesting information about Progestin Birth Control Pills  • Negative: Caller requesting information about Post-Op pain or medicines  • Negative: Caller requesting a prescription antibiotic (such as Penicillin) for Strep throat and has a positive culture result  • Negative: Caller requesting a prescription anti-viral med (such as Tamiflu) and has influenza (flu)  symptoms  • Negative: Immunization reaction suspected  • Negative: Rash while taking a medicine or within 3 days of stopping it  • Negative: [1] Asthma and [2] having symptoms of asthma (cough, wheezing, etc.)  • Negative: [1] Symptom of illness (e.g., headache, abdominal pain, earache, vomiting) AND [2] more than mild  • Negative: Breastfeeding questions about mother's medicines and diet  • Negative: MORE THAN A DOUBLE DOSE of a prescription or over-the-counter (OTC) drug  • Negative: [1] DOUBLE DOSE (an extra dose or lesser amount) of prescription drug AND [2] any symptoms (e.g., dizziness, nausea, pain, sleepiness)  • Negative: [1] DOUBLE DOSE (an extra dose or lesser amount) of over-the-counter (OTC) drug AND [2] any symptoms (e.g.,  "dizziness, nausea, pain, sleepiness)  • Negative: Took another person's prescription drug  • Negative: [1] DOUBLE DOSE (an extra dose or lesser amount) of prescription drug AND [2] NO symptoms (Exception: a double dose of antibiotics)  • Negative: Diabetes drug error or overdose (e.g., took wrong type of insulin or took extra dose)  • Negative: [1] Prescription refill request for ESSENTIAL medicine (i.e., likelihood of harm to patient if not taken) AND [2] triager unable to refill per department policy  • Negative: [1] Prescription not at pharmacy AND [2] was prescribed by PCP recently (Exception: triager has access to EMR and prescription is recorded there. Go to Home Care and confirm for pharmacy.)  • Negative: [1] Pharmacy calling with prescription question AND [2] triager unable to answer question    Answer Assessment - Initial Assessment Questions  1. NAME of MEDICATION: \"What medicine are you calling about?\"      Forcet  2. QUESTION: \"What is your question?\" (e.g., medication refill, side effect)   pharmacy unable to transfer  3. PRESCRIBING HCP: \"Who prescribed it?\" Reason: if prescribed by specialist, call should be referred to that group.     4. SYMPTOMS: \"Do you have any symptoms?\"     COVID  5. SEVERITY: If symptoms are present, ask \"Are they mild, moderate or severe?\"   moderate  6. PREGNANCY:  \"Is there any chance that you are pregnant?\" \"When was your last menstrual period?\"    na    Protocols used: MEDICATION QUESTION CALL-ADULT-AH      "

## 2021-08-07 ENCOUNTER — TRANSITIONAL CARE MANAGEMENT TELEPHONE ENCOUNTER (OUTPATIENT)
Dept: CALL CENTER | Facility: HOSPITAL | Age: 61
End: 2021-08-07

## 2021-08-07 LAB
BACTERIA SPEC AEROBE CULT: NORMAL
BACTERIA SPEC AEROBE CULT: NORMAL

## 2021-08-07 NOTE — OUTREACH NOTE
Call Center TCM Note      Responses   [facility] patient discharged from?  [place]   Does the patient have one of the following disease processes/diagnoses(primary or secondary)?  COVID-19   COVID-19 underlying condition?  None   TCM attempt successful?  Yes   Call start time  1000   Call end time  1016   Discharge diagnosis  PN r/t covid 19   Meds reviewed with patient/caregiver?  Yes   Is the patient having any side effects they believe may be caused by any medication additions or changes?  No   Does the patient have all medications ordered at discharge?  Yes   Is the patient taking all medications as directed (includes completed medication regime)?  Yes   Does the patient have a primary care provider?   Yes   Comments regarding PCP  Hospital D/C follow-up scheduled for 8/17/21   Does the patient have an appointment with their PCP or specialist within 7 days of discharge?  Greater than 7 days   Nursing Interventions  Verified appointment date/time/provider   Has the patient kept scheduled appointments due by today?  N/A   Has home health visited the patient within 72 hours of discharge?  N/A   Psychosocial issues?  No   Did the patient receive a copy of their discharge instructions?  Yes   Did the patient receive a copy of COVID-19 specific instructions?  Yes   Nursing interventions  Reviewed instructions with patient   What is the patient's perception of their health status since discharge?  Improving [Still tired, headache relieved but overall improvement. ]   Does the patient have any of the following symptoms?  Loss of taste/smell, Cough [NP cough]   Nursing Interventions  Nurse provided patient education [Dis'd s/s of worsening condition which require MD notification such as increased SOA, change in amount/color of mucus, chest pain, fever, increased cough-v/u.]   Pulse Ox monitoring  Intermittent   Pulse Ox device source  Patient   O2 Sat comments  91%-93%  on room air    O2 Sat: education  provided  Sat levels, When to seek care   Is the patient/caregiver able to teach back steps to recovery at home?  Set small, achievable goals for return to baseline health, Rest and rebuild strength, gradually increase activity, Eat a well-balance diet   If the patient is a current smoker, are they able to teach back resources for cessation?  Not a smoker   Is the patient/caregiver able to teach back the hierarchy of who to call/visit for symptoms/problems? PCP, Specialist, Home health nurse, Urgent Care, ED, 911  Yes   TCM call completed?  Yes          Cristiana Oneill RN    8/7/2021, 10:17 EDT

## 2021-08-08 ENCOUNTER — READMISSION MANAGEMENT (OUTPATIENT)
Dept: CALL CENTER | Facility: HOSPITAL | Age: 61
End: 2021-08-08

## 2021-08-08 ENCOUNTER — HOSPITAL ENCOUNTER (EMERGENCY)
Facility: HOSPITAL | Age: 61
Discharge: HOME | End: 2021-08-08
Admitting: FAMILY MEDICINE

## 2021-08-08 VITALS
WEIGHT: 225 LBS | SYSTOLIC BLOOD PRESSURE: 129 MMHG | BODY MASS INDEX: 39.87 KG/M2 | OXYGEN SATURATION: 91 % | RESPIRATION RATE: 18 BRPM | HEIGHT: 63 IN | HEART RATE: 71 BPM | DIASTOLIC BLOOD PRESSURE: 62 MMHG | TEMPERATURE: 99 F

## 2021-08-08 DIAGNOSIS — U07.1 COVID-19: Primary | ICD-10-CM

## 2021-08-08 DIAGNOSIS — R06.02 SHORTNESS OF BREATH: ICD-10-CM

## 2021-08-08 PROCEDURE — 93005 ELECTROCARDIOGRAM TRACING: CPT

## 2021-08-08 PROCEDURE — 99283 EMERGENCY DEPT VISIT LOW MDM: CPT

## 2021-08-08 PROCEDURE — 93010 ELECTROCARDIOGRAM REPORT: CPT | Performed by: INTERNAL MEDICINE

## 2021-08-08 NOTE — ED PROVIDER NOTES
Subjective   Patient was admitted for COVID-19 infection and discharged on 2021.  Patient was given Decadron which she has been taking after discharge.  Patient has been measuring her pulse oximetry using her portable device and has been getting numbers down to the 70s.          Review of Systems   Constitutional: Positive for fatigue. Negative for activity change and appetite change.   HENT: Negative for congestion and ear pain.    Eyes: Negative for pain and discharge.   Respiratory: Positive for shortness of breath. Negative for chest tightness.    Cardiovascular: Negative for chest pain and palpitations.   Gastrointestinal: Negative for abdominal distention and abdominal pain.   Endocrine: Negative for cold intolerance and heat intolerance.   Genitourinary: Negative for difficulty urinating and dysuria.   Musculoskeletal: Negative for arthralgias and back pain.   Skin: Negative for color change and rash.   Allergic/Immunologic: Negative for environmental allergies and food allergies.   Neurological: Negative for dizziness and headaches.   Hematological: Negative for adenopathy. Does not bruise/bleed easily.   Psychiatric/Behavioral: Negative for agitation and confusion.       Past Medical History:   Diagnosis Date   • Anxiety    • Arthritis    • History of     • Ingrown toenail    • Knee pain, bilateral    • Migraine    • Plantar fasciitis    • Wears glasses        Allergies   Allergen Reactions   • Bactrim [Sulfamethoxazole-Trimethoprim] Hives   • Adhesive Tape Other (See Comments)     BLISTERS   • Amoxicillin-Pot Clavulanate Diarrhea   • Latex Rash   • Pregabalin Hives   • Sulfa Antibiotics Hives       Past Surgical History:   Procedure Laterality Date   • BACK SURGERY  2016   • BREAST BIOPSY     •  SECTION     • HYSTERECTOMY     • KNEE ARTHROSCOPY Right 2018    Procedure: KNEE ARTHROSCOPY with debridement medial meniscus     (LATEX ALLERGY) ;  Surgeon: Carlos Manuel Sanchez  MD Rowena;  Location: [facility] OR;  Service: Orthopedics   • KNEE ARTHROSCOPY Right 5/29/2019    Procedure: Right KNEE ARTHROSCOPY with debridement medial meniscus;  Surgeon: Carlos Manuel Sanchez MD;  Location: [facility] OR;  Service: Orthopedics   • KNEE ARTHROSCOPY Right 12/2/2019    Procedure: KNEE ARTHROSCOPY with debridement medial meniscus;  Surgeon: Carlos Manuel Sanchez MD;  Location: [facility] OR;  Service: Orthopedics   • MTP JOINT FUSION Right 1/24/2018    Procedure: FIRST METATARSOPHALANGEAL JOINT ARTHRODESIS       (C-ARM)                  LATEX ALLERGY;  Surgeon: Kwadwo Jensen DPM;  Location: [facility];  Service:    • SINUS SURGERY  1978       Family History   Problem Relation Age of Onset   • Heart disease Mother    • Hypertension Mother    • Thyroid disease Mother    • Heart disease Father    • Hypertension Father    • Thyroid disease Father    • Thyroid disease Sister    • Breast cancer Paternal Grandmother        Social History     Socioeconomic History   • Marital status:      Spouse name: Not on file   • Number of children: Not on file   • Years of education: Not on file   • Highest education level: Not on file   Tobacco Use   • Smoking status: Former Smoker   • Smokeless tobacco: Never Used   Substance and Sexual Activity   • Alcohol use: No   • Drug use: No   • Sexual activity: Defer           Objective   Physical Exam  Vitals and nursing note reviewed.   Constitutional:       Appearance: She is well-developed.   HENT:      Head: Normocephalic and atraumatic.   Eyes:      Pupils: Pupils are equal, round, and reactive to light.   Neck:      Thyroid: No thyromegaly.      Vascular: No JVD.      Trachea: No tracheal deviation.   Cardiovascular:      Rate and Rhythm: Normal rate.      Pulses:           Radial pulses are 2+ on the right side and 2+ on the left side.        Dorsalis pedis pulses are 2+ on the right side and 2+ on the left side.      Heart sounds: Normal heart sounds, S1  "normal and S2 normal.   Pulmonary:      Effort: Pulmonary effort is normal.      Breath sounds: Rhonchi (few scattered, otherwise clear) present.   Abdominal:      General: Bowel sounds are normal.   Musculoskeletal:         General: Normal range of motion.   Skin:     General: Skin is warm and dry.      Capillary Refill: Capillary refill takes 2 to 3 seconds.   Neurological:      Mental Status: She is alert and oriented to person, place, and time.      GCS: GCS eye subscore is 4. GCS verbal subscore is 5. GCS motor subscore is 6.   Psychiatric:         Speech: Speech normal.         Behavior: Behavior normal.         Thought Content: Thought content normal.       Vitals:    08/08/21 1033 08/08/21 1106   BP:  129/62   BP Location:  Right arm   Patient Position:  Lying   Pulse: 80 71   Resp: 18 18   Temp: 98.5 °F (36.9 °C) 99 °F (37.2 °C)   TempSrc: Infrared Oral   SpO2: 93% 91%   Weight:  102 kg (225 lb)   Height:  160 cm (63\")       ECG 12 Lead      Date/Time: 8/8/2021 12:07 PM  Performed by: Melody Mohan MD  Authorized by: Emergency, Triage Protocol, MD   Interpreted by physician  Comparison: not compared with previous ECG   Previous ECG: no previous ECG available  Rhythm: sinus rhythm  Clinical impression: abnormal ECG                 ED Course                                           MDM  Number of Diagnoses or Management Options  COVID-19: established and improving  Shortness of breath: new and requires workup  Diagnosis management comments: Patient with known COVID-19 infection discharged from hospital 2 days ago.  Resting sats between 90 and 93%.  Ambulation sats between 90 and 93%.  Patient with no new symptoms, persistent fatigue and subjective shortness of breath.  Patient to be discharged home with follow-up as needed.  Written and verbal instructions given to patient on when to return to ED.    Risk of Complications, Morbidity, and/or Mortality  Presenting problems: minimal  Diagnostic " procedures: minimal  Management options: minimal        Final diagnoses:   COVID-19   Shortness of breath       ED Disposition  ED Disposition     ED Disposition Condition Comment    Discharge Stable           Papi Mckeon MD  [facility]   [address] [facility] [id number] [phone]    Call in 1 day  for follow up         Medication List      No changes were made to your prescriptions during this visit.       This document has been electronically signed by Melody Mohan MD on August 8, 2021 12:10 CDT    Melody Mohan MD PGY-3  Part of this note may be an electronic transcription/translation of spoken language to printed text using the Dragon Dictation System.        Melody Mohan MD  Resident  08/08/21 7724

## 2021-08-08 NOTE — ED NOTES
This tech ambulated the patient in the room patients oxygen saturation started at 93% on room air and stayed around 91% during ambulation. Patient returned to stretcher and placed back on the cardiac monitor. Call light within reach. Patient has no other needs at this time.      Madeline Rojas  08/08/21 120

## 2021-08-08 NOTE — OUTREACH NOTE
COVID-19 Week 1 Survey      Responses   [facility] patient discharged from?  [place]   Does the patient have one of the following disease processes/diagnoses(primary or secondary)?  COVID-19   COVID-19 underlying condition?  None   Call Number  Call 2   Week 1 Call successful?  Yes   Call start time  1041   Call end time  1046   Discharge diagnosis  PN r/t covid 19   Meds reviewed with patient/caregiver?  Yes   Is the patient having any side effects they believe may be caused by any medication additions or changes?  No   Does the patient have all medications ordered at discharge?  Yes   Is the patient taking all medications as directed (includes completed medication regime)?  Yes   Does the patient have a primary care provider?   Yes   Comments regarding PCP  Hospital D/C follow-up scheduled for 8/17/21   Does the patient have an appointment with their PCP or specialist within 7 days of discharge?  Greater than 7 days   Nursing Interventions  Verified appointment date/time/provider   Has the patient kept scheduled appointments due by today?  N/A   Psychosocial issues?  No   Did the patient receive a copy of their discharge instructions?  Yes   Did the patient receive a copy of COVID-19 specific instructions?  Yes   Nursing interventions  Reviewed instructions with patient   What is the patient's perception of their health status since discharge?  Worsening   Does the patient have any of the following symptoms?  Cough, Shortness of breath, Loss of taste/smell   Nursing Interventions  Nurse provided patient education   Pulse Ox monitoring  Intermittent   Pulse Ox device source  Patient   O2 Sat comments  85% consistently this morning   O2 Sat: education provided  Sat levels, Monitoring frequency, When to seek care   O2 Sat education comments  go to ER now   Is the patient/caregiver able to teach back steps to recovery at home?  Set small, achievable goals for return to baseline health   If the patient is a  current smoker, are they able to teach back resources for cessation?  Not a smoker   Is the patient/caregiver able to teach back the hierarchy of who to call/visit for symptoms/problems? PCP, Specialist, Home health nurse, Urgent Care, ED, 911  Yes   COVID-19 call completed?  Yes          Debora Elliott RN

## 2021-08-08 NOTE — ED NOTES
Pt c/o shortness of air upon activity. Pt was admitted to hospital last Monday and was discharged Friday. Pt tested positive for covid on Monday and s/s have worsened and just not improved since. Pt states she has been checking her oxygen saturation at home and has been averaging 85-90%. Pt has severe fatigue.     Nandini Garibay RN  08/08/21 7918

## 2021-08-08 NOTE — DISCHARGE INSTRUCTIONS
Follow-up with your PCP via telephone visit.  Use your pulse ox at home and make sure you have a good waveform on the pulse ox when you are evaluating your oxygen level.  Return to ED should you develop fever/chills, worsening shortness of breath, or any other concerning symptoms.

## 2021-08-09 ENCOUNTER — READMISSION MANAGEMENT (OUTPATIENT)
Dept: CALL CENTER | Facility: HOSPITAL | Age: 61
End: 2021-08-09

## 2021-08-09 ENCOUNTER — APPOINTMENT (OUTPATIENT)
Dept: CT IMAGING | Facility: HOSPITAL | Age: 61
End: 2021-08-09

## 2021-08-09 ENCOUNTER — TELEPHONE (OUTPATIENT)
Dept: FAMILY MEDICINE CLINIC | Facility: CLINIC | Age: 61
End: 2021-08-09

## 2021-08-09 ENCOUNTER — APPOINTMENT (OUTPATIENT)
Dept: CARDIOLOGY | Facility: HOSPITAL | Age: 61
End: 2021-08-09

## 2021-08-09 ENCOUNTER — APPOINTMENT (OUTPATIENT)
Dept: GENERAL RADIOLOGY | Facility: HOSPITAL | Age: 61
End: 2021-08-09

## 2021-08-09 ENCOUNTER — HOSPITAL ENCOUNTER (INPATIENT)
Facility: HOSPITAL | Age: 61
LOS: 4 days | Discharge: HOME HEALTH | End: 2021-08-16
Attending: EMERGENCY MEDICINE | Admitting: INTERNAL MEDICINE

## 2021-08-09 DIAGNOSIS — I26.99 BILATERAL PULMONARY EMBOLISM (HCC): Primary | ICD-10-CM

## 2021-08-09 DIAGNOSIS — U07.1 COVID-19: ICD-10-CM

## 2021-08-09 PROBLEM — J96.01 ACUTE RESPIRATORY FAILURE WITH HYPOXIA: Status: ACTIVE | Noted: 2021-08-09

## 2021-08-09 LAB
ALBUMIN SERPL-MCNC: 3.4 G/DL (ref 3.5–5.2)
ALBUMIN/GLOB SERPL: 1.1 G/DL
ALP SERPL-CCNC: 73 U/L (ref 39–117)
ALT SERPL W P-5'-P-CCNC: 79 U/L (ref 1–33)
ANION GAP SERPL CALCULATED.3IONS-SCNC: 10 MMOL/L (ref 5–15)
AST SERPL-CCNC: 42 U/L (ref 1–32)
BASOPHILS # BLD AUTO: 0.06 10*3/MM3 (ref 0–0.2)
BASOPHILS NFR BLD AUTO: 0.4 % (ref 0–1.5)
BH CV ECHO MEAS - ACS: 1.5 CM
BH CV ECHO MEAS - AO MAX PG (FULL): 4.2 MMHG
BH CV ECHO MEAS - AO MAX PG: 7.8 MMHG
BH CV ECHO MEAS - AO MEAN PG (FULL): 2 MMHG
BH CV ECHO MEAS - AO MEAN PG: 4 MMHG
BH CV ECHO MEAS - AO ROOT AREA (BSA CORRECTED): 1.6
BH CV ECHO MEAS - AO ROOT AREA: 8 CM^2
BH CV ECHO MEAS - AO ROOT DIAM: 3.2 CM
BH CV ECHO MEAS - AO V2 MAX: 140 CM/SEC
BH CV ECHO MEAS - AO V2 MEAN: 99.5 CM/SEC
BH CV ECHO MEAS - AO V2 VTI: 24.7 CM
BH CV ECHO MEAS - ASC AORTA: 3.2 CM
BH CV ECHO MEAS - AVA(I,A): 1.5 CM^2
BH CV ECHO MEAS - AVA(I,D): 1.5 CM^2
BH CV ECHO MEAS - AVA(V,A): 1.7 CM^2
BH CV ECHO MEAS - AVA(V,D): 1.7 CM^2
BH CV ECHO MEAS - BSA(HAYCOCK): 2.2 M^2
BH CV ECHO MEAS - BSA: 2 M^2
BH CV ECHO MEAS - BZI_BMI: 39.9 KILOGRAMS/M^2
BH CV ECHO MEAS - BZI_METRIC_HEIGHT: 160 CM
BH CV ECHO MEAS - BZI_METRIC_WEIGHT: 102.1 KG
BH CV ECHO MEAS - EDV(CUBED): 50.7 ML
BH CV ECHO MEAS - EDV(MOD-SP2): 49.1 ML
BH CV ECHO MEAS - EDV(MOD-SP4): 53.2 ML
BH CV ECHO MEAS - EDV(TEICH): 58.1 ML
BH CV ECHO MEAS - EF(CUBED): 86.7 %
BH CV ECHO MEAS - EF(MOD-SP2): 68.8 %
BH CV ECHO MEAS - EF(MOD-SP4): 64.1 %
BH CV ECHO MEAS - EF(TEICH): 81 %
BH CV ECHO MEAS - ESV(CUBED): 6.8 ML
BH CV ECHO MEAS - ESV(MOD-SP2): 15.3 ML
BH CV ECHO MEAS - ESV(MOD-SP4): 19.1 ML
BH CV ECHO MEAS - ESV(TEICH): 11 ML
BH CV ECHO MEAS - FS: 48.9 %
BH CV ECHO MEAS - IVS/LVPW: 1.1
BH CV ECHO MEAS - IVSD: 1.4 CM
BH CV ECHO MEAS - LA DIMENSION: 2.9 CM
BH CV ECHO MEAS - LA/AO: 0.91
BH CV ECHO MEAS - LV DIASTOLIC VOL/BSA (35-75): 26.2 ML/M^2
BH CV ECHO MEAS - LV MASS(C)D: 175.5 GRAMS
BH CV ECHO MEAS - LV MASS(C)DI: 86.3 GRAMS/M^2
BH CV ECHO MEAS - LV MAX PG: 3.6 MMHG
BH CV ECHO MEAS - LV MEAN PG: 2 MMHG
BH CV ECHO MEAS - LV SYSTOLIC VOL/BSA (12-30): 9.4 ML/M^2
BH CV ECHO MEAS - LV V1 MAX: 95.3 CM/SEC
BH CV ECHO MEAS - LV V1 MEAN: 69.9 CM/SEC
BH CV ECHO MEAS - LV V1 VTI: 14.4 CM
BH CV ECHO MEAS - LVIDD: 3.7 CM
BH CV ECHO MEAS - LVIDS: 1.9 CM
BH CV ECHO MEAS - LVLD AP2: 6.6 CM
BH CV ECHO MEAS - LVLD AP4: 6.9 CM
BH CV ECHO MEAS - LVLS AP2: 6.4 CM
BH CV ECHO MEAS - LVLS AP4: 6.2 CM
BH CV ECHO MEAS - LVOT AREA (M): 2.5 CM^2
BH CV ECHO MEAS - LVOT AREA: 2.5 CM^2
BH CV ECHO MEAS - LVOT DIAM: 1.8 CM
BH CV ECHO MEAS - LVPWD: 1.3 CM
BH CV ECHO MEAS - MV A MAX VEL: 81 CM/SEC
BH CV ECHO MEAS - MV DEC SLOPE: 347 CM/SEC^2
BH CV ECHO MEAS - MV E MAX VEL: 53.6 CM/SEC
BH CV ECHO MEAS - MV E/A: 0.66
BH CV ECHO MEAS - MV MAX PG: 4.6 MMHG
BH CV ECHO MEAS - MV MEAN PG: 2 MMHG
BH CV ECHO MEAS - MV P1/2T MAX VEL: 67.9 CM/SEC
BH CV ECHO MEAS - MV P1/2T: 57.3 MSEC
BH CV ECHO MEAS - MV V2 MAX: 107 CM/SEC
BH CV ECHO MEAS - MV V2 MEAN: 61.1 CM/SEC
BH CV ECHO MEAS - MV V2 VTI: 15.5 CM
BH CV ECHO MEAS - MVA P1/2T LCG: 3.2 CM^2
BH CV ECHO MEAS - MVA(P1/2T): 3.8 CM^2
BH CV ECHO MEAS - MVA(VTI): 2.4 CM^2
BH CV ECHO MEAS - PA MAX PG: 5.9 MMHG
BH CV ECHO MEAS - PA V2 MAX: 121 CM/SEC
BH CV ECHO MEAS - RAP SYSTOLE: 10 MMHG
BH CV ECHO MEAS - RVDD: 3.2 CM
BH CV ECHO MEAS - RVSP: 50.4 MMHG
BH CV ECHO MEAS - SI(AO): 97.7 ML/M^2
BH CV ECHO MEAS - SI(CUBED): 21.6 ML/M^2
BH CV ECHO MEAS - SI(LVOT): 18 ML/M^2
BH CV ECHO MEAS - SI(MOD-SP2): 16.6 ML/M^2
BH CV ECHO MEAS - SI(MOD-SP4): 16.8 ML/M^2
BH CV ECHO MEAS - SI(TEICH): 23.2 ML/M^2
BH CV ECHO MEAS - SV(AO): 198.6 ML
BH CV ECHO MEAS - SV(CUBED): 43.9 ML
BH CV ECHO MEAS - SV(LVOT): 36.6 ML
BH CV ECHO MEAS - SV(MOD-SP2): 33.8 ML
BH CV ECHO MEAS - SV(MOD-SP4): 34.1 ML
BH CV ECHO MEAS - SV(TEICH): 47.1 ML
BH CV ECHO MEAS - TR MAX VEL: 318 CM/SEC
BILIRUB SERPL-MCNC: 0.2 MG/DL (ref 0–1.2)
BUN SERPL-MCNC: 14 MG/DL (ref 8–23)
BUN/CREAT SERPL: 19.4 (ref 7–25)
CALCIUM SPEC-SCNC: 9.2 MG/DL (ref 8.6–10.5)
CHLORIDE SERPL-SCNC: 100 MMOL/L (ref 98–107)
CO2 SERPL-SCNC: 24 MMOL/L (ref 22–29)
CREAT SERPL-MCNC: 0.72 MG/DL (ref 0.57–1)
CRP SERPL-MCNC: 5.24 MG/DL (ref 0–0.5)
D-DIMER, QUANTITATIVE (MAD,POW, STR): >4000 NG/ML (FEU) (ref 0–470)
DEPRECATED RDW RBC AUTO: 46.5 FL (ref 37–54)
EOSINOPHIL # BLD AUTO: 0.02 10*3/MM3 (ref 0–0.4)
EOSINOPHIL NFR BLD AUTO: 0.1 % (ref 0.3–6.2)
ERYTHROCYTE [DISTWIDTH] IN BLOOD BY AUTOMATED COUNT: 14.1 % (ref 12.3–15.4)
GFR SERPL CREATININE-BSD FRML MDRD: 82 ML/MIN/1.73
GLOBULIN UR ELPH-MCNC: 3 GM/DL
GLUCOSE SERPL-MCNC: 137 MG/DL (ref 65–99)
HCT VFR BLD AUTO: 39 % (ref 34–46.6)
HGB BLD-MCNC: 12.9 G/DL (ref 12–15.9)
HOLD SPECIMEN: NORMAL
HOLD SPECIMEN: NORMAL
IMM GRANULOCYTES # BLD AUTO: 0.46 10*3/MM3 (ref 0–0.05)
IMM GRANULOCYTES NFR BLD AUTO: 2.8 % (ref 0–0.5)
LV EF 2D ECHO EST: 55 %
LYMPHOCYTES # BLD AUTO: 1.93 10*3/MM3 (ref 0.7–3.1)
LYMPHOCYTES NFR BLD AUTO: 11.9 % (ref 19.6–45.3)
MAGNESIUM SERPL-MCNC: 1.8 MG/DL (ref 1.6–2.4)
MCH RBC QN AUTO: 29.8 PG (ref 26.6–33)
MCHC RBC AUTO-ENTMCNC: 33.1 G/DL (ref 31.5–35.7)
MCV RBC AUTO: 90.1 FL (ref 79–97)
MONOCYTES # BLD AUTO: 1.15 10*3/MM3 (ref 0.1–0.9)
MONOCYTES NFR BLD AUTO: 7.1 % (ref 5–12)
NEUTROPHILS NFR BLD AUTO: 12.64 10*3/MM3 (ref 1.7–7)
NEUTROPHILS NFR BLD AUTO: 77.7 % (ref 42.7–76)
NRBC BLD AUTO-RTO: 0 /100 WBC (ref 0–0.2)
PLATELET # BLD AUTO: 333 10*3/MM3 (ref 140–450)
PMV BLD AUTO: 9.7 FL (ref 6–12)
POTASSIUM SERPL-SCNC: 4 MMOL/L (ref 3.5–5.2)
PROT SERPL-MCNC: 6.4 G/DL (ref 6–8.5)
QT INTERVAL: 326 MS
QT INTERVAL: 354 MS
QTC INTERVAL: 389 MS
QTC INTERVAL: 418 MS
RBC # BLD AUTO: 4.33 10*6/MM3 (ref 3.77–5.28)
SODIUM SERPL-SCNC: 134 MMOL/L (ref 136–145)
WBC # BLD AUTO: 16.26 10*3/MM3 (ref 3.4–10.8)

## 2021-08-09 PROCEDURE — 93306 TTE W/DOPPLER COMPLETE: CPT | Performed by: INTERNAL MEDICINE

## 2021-08-09 PROCEDURE — 86140 C-REACTIVE PROTEIN: CPT | Performed by: INTERNAL MEDICINE

## 2021-08-09 PROCEDURE — G0378 HOSPITAL OBSERVATION PER HR: HCPCS

## 2021-08-09 PROCEDURE — 71045 X-RAY EXAM CHEST 1 VIEW: CPT

## 2021-08-09 PROCEDURE — 85379 FIBRIN DEGRADATION QUANT: CPT | Performed by: NURSE PRACTITIONER

## 2021-08-09 PROCEDURE — 0 IOPAMIDOL PER 1 ML: Performed by: EMERGENCY MEDICINE

## 2021-08-09 PROCEDURE — 85025 COMPLETE CBC W/AUTO DIFF WBC: CPT | Performed by: NURSE PRACTITIONER

## 2021-08-09 PROCEDURE — 93005 ELECTROCARDIOGRAM TRACING: CPT | Performed by: NURSE PRACTITIONER

## 2021-08-09 PROCEDURE — 25010000002 ENOXAPARIN PER 10 MG: Performed by: NURSE PRACTITIONER

## 2021-08-09 PROCEDURE — 71275 CT ANGIOGRAPHY CHEST: CPT

## 2021-08-09 PROCEDURE — 93306 TTE W/DOPPLER COMPLETE: CPT

## 2021-08-09 PROCEDURE — B24BZZZ ULTRASONOGRAPHY OF HEART WITH AORTA: ICD-10-PCS | Performed by: INTERNAL MEDICINE

## 2021-08-09 PROCEDURE — 80053 COMPREHEN METABOLIC PANEL: CPT | Performed by: NURSE PRACTITIONER

## 2021-08-09 PROCEDURE — 93010 ELECTROCARDIOGRAM REPORT: CPT | Performed by: INTERNAL MEDICINE

## 2021-08-09 PROCEDURE — 99284 EMERGENCY DEPT VISIT MOD MDM: CPT

## 2021-08-09 PROCEDURE — 83735 ASSAY OF MAGNESIUM: CPT | Performed by: INTERNAL MEDICINE

## 2021-08-09 RX ORDER — SODIUM CHLORIDE 0.9 % (FLUSH) 0.9 %
10 SYRINGE (ML) INJECTION AS NEEDED
Status: DISCONTINUED | OUTPATIENT
Start: 2021-08-09 | End: 2021-08-13

## 2021-08-09 RX ORDER — ASCORBIC ACID 500 MG
500 TABLET ORAL DAILY
Status: DISCONTINUED | OUTPATIENT
Start: 2021-08-10 | End: 2021-08-16 | Stop reason: HOSPADM

## 2021-08-09 RX ORDER — SODIUM CHLORIDE 0.9 % (FLUSH) 0.9 %
10 SYRINGE (ML) INJECTION EVERY 12 HOURS SCHEDULED
Status: DISCONTINUED | OUTPATIENT
Start: 2021-08-09 | End: 2021-08-13

## 2021-08-09 RX ORDER — DEXAMETHASONE SODIUM PHOSPHATE 4 MG/ML
6 INJECTION, SOLUTION INTRA-ARTICULAR; INTRALESIONAL; INTRAMUSCULAR; INTRAVENOUS; SOFT TISSUE DAILY
Status: DISCONTINUED | OUTPATIENT
Start: 2021-08-10 | End: 2021-08-13

## 2021-08-09 RX ORDER — ACETAMINOPHEN 325 MG/1
650 TABLET ORAL EVERY 4 HOURS PRN
Status: DISCONTINUED | OUTPATIENT
Start: 2021-08-09 | End: 2021-08-13

## 2021-08-09 RX ORDER — ONDANSETRON 2 MG/ML
4 INJECTION INTRAMUSCULAR; INTRAVENOUS EVERY 6 HOURS PRN
Status: DISCONTINUED | OUTPATIENT
Start: 2021-08-09 | End: 2021-08-16 | Stop reason: HOSPADM

## 2021-08-09 RX ORDER — BUTALBITAL, ACETAMINOPHEN AND CAFFEINE 50; 325; 40 MG/1; MG/1; MG/1
1 TABLET ORAL EVERY 6 HOURS PRN
Status: DISCONTINUED | OUTPATIENT
Start: 2021-08-09 | End: 2021-08-13

## 2021-08-09 RX ORDER — DULOXETIN HYDROCHLORIDE 60 MG/1
60 CAPSULE, DELAYED RELEASE ORAL DAILY
Status: DISCONTINUED | OUTPATIENT
Start: 2021-08-10 | End: 2021-08-16 | Stop reason: HOSPADM

## 2021-08-09 RX ORDER — ZINC SULFATE 50(220)MG
220 CAPSULE ORAL DAILY
Status: DISCONTINUED | OUTPATIENT
Start: 2021-08-10 | End: 2021-08-16 | Stop reason: HOSPADM

## 2021-08-09 RX ORDER — DEXAMETHASONE SODIUM PHOSPHATE 4 MG/ML
6 INJECTION, SOLUTION INTRA-ARTICULAR; INTRALESIONAL; INTRAMUSCULAR; INTRAVENOUS; SOFT TISSUE DAILY
Status: DISCONTINUED | OUTPATIENT
Start: 2021-08-09 | End: 2021-08-09

## 2021-08-09 RX ORDER — MELATONIN
2000 DAILY
Status: DISCONTINUED | OUTPATIENT
Start: 2021-08-10 | End: 2021-08-16 | Stop reason: HOSPADM

## 2021-08-09 RX ORDER — LANOLIN ALCOHOL/MO/W.PET/CERES
3 CREAM (GRAM) TOPICAL NIGHTLY PRN
Status: DISCONTINUED | OUTPATIENT
Start: 2021-08-09 | End: 2021-08-16 | Stop reason: HOSPADM

## 2021-08-09 RX ORDER — DIPHENOXYLATE HYDROCHLORIDE AND ATROPINE SULFATE 2.5; .025 MG/1; MG/1
1 TABLET ORAL DAILY
Status: DISCONTINUED | OUTPATIENT
Start: 2021-08-10 | End: 2021-08-16 | Stop reason: HOSPADM

## 2021-08-09 RX ADMIN — IOPAMIDOL 76 ML: 755 INJECTION, SOLUTION INTRAVENOUS at 14:37

## 2021-08-09 RX ADMIN — SODIUM CHLORIDE, PRESERVATIVE FREE 10 ML: 5 INJECTION INTRAVENOUS at 22:05

## 2021-08-09 RX ADMIN — Medication 3 MG: at 22:04

## 2021-08-09 RX ADMIN — ENOXAPARIN SODIUM 100 MG: 100 INJECTION SUBCUTANEOUS at 16:50

## 2021-08-09 NOTE — OUTREACH NOTE
COVID-19 Week 1 Survey      Responses   [facility] patient discharged from?  [place]   Does the patient have one of the following disease processes/diagnoses(primary or secondary)?  COVID-19   COVID-19 underlying condition?  None   Call Number  Call 3   Week 1 Call successful?  No   Discharge diagnosis  Pneumonia due to COVID-19 virus          Ute Enriquez RN

## 2021-08-09 NOTE — OUTREACH NOTE
COVID-19 Week 1 Survey      Responses   [facility] patient discharged from?  [place]   Does the patient have one of the following disease processes/diagnoses(primary or secondary)?  COVID-19   COVID-19 underlying condition?  None   Call Number  Call 4   Week 1 Call successful?  No   Revoke  Readmitted   Discharge diagnosis  Pneumonia due to COVID-19 virus          Hattie Vargas RN

## 2021-08-09 NOTE — TELEPHONE ENCOUNTER
Ms. David has covid. She is having a hard time keeping her oxygen level up. She would like to have you call her so she can speak with you about possibly getting some home oxygen ordered. Please call back @ [phone]

## 2021-08-10 LAB
ANION GAP SERPL CALCULATED.3IONS-SCNC: 13 MMOL/L (ref 5–15)
BASOPHILS # BLD AUTO: 0.08 10*3/MM3 (ref 0–0.2)
BASOPHILS NFR BLD AUTO: 0.5 % (ref 0–1.5)
BUN SERPL-MCNC: 14 MG/DL (ref 8–23)
BUN/CREAT SERPL: 20.3 (ref 7–25)
CALCIUM SPEC-SCNC: 8.6 MG/DL (ref 8.6–10.5)
CHLORIDE SERPL-SCNC: 102 MMOL/L (ref 98–107)
CO2 SERPL-SCNC: 22 MMOL/L (ref 22–29)
CREAT SERPL-MCNC: 0.69 MG/DL (ref 0.57–1)
DEPRECATED RDW RBC AUTO: 46.5 FL (ref 37–54)
EOSINOPHIL # BLD AUTO: 0.21 10*3/MM3 (ref 0–0.4)
EOSINOPHIL NFR BLD AUTO: 1.3 % (ref 0.3–6.2)
ERYTHROCYTE [DISTWIDTH] IN BLOOD BY AUTOMATED COUNT: 14.4 % (ref 12.3–15.4)
GFR SERPL CREATININE-BSD FRML MDRD: 86 ML/MIN/1.73
GLUCOSE SERPL-MCNC: 100 MG/DL (ref 65–99)
HCT VFR BLD AUTO: 38.4 % (ref 34–46.6)
HGB BLD-MCNC: 12.9 G/DL (ref 12–15.9)
IMM GRANULOCYTES # BLD AUTO: 0.58 10*3/MM3 (ref 0–0.05)
IMM GRANULOCYTES NFR BLD AUTO: 3.7 % (ref 0–0.5)
LYMPHOCYTES # BLD AUTO: 4.4 10*3/MM3 (ref 0.7–3.1)
LYMPHOCYTES NFR BLD AUTO: 28 % (ref 19.6–45.3)
MCH RBC QN AUTO: 30.2 PG (ref 26.6–33)
MCHC RBC AUTO-ENTMCNC: 33.6 G/DL (ref 31.5–35.7)
MCV RBC AUTO: 89.9 FL (ref 79–97)
MONOCYTES # BLD AUTO: 1.39 10*3/MM3 (ref 0.1–0.9)
MONOCYTES NFR BLD AUTO: 8.8 % (ref 5–12)
NEUTROPHILS NFR BLD AUTO: 57.7 % (ref 42.7–76)
NEUTROPHILS NFR BLD AUTO: 9.06 10*3/MM3 (ref 1.7–7)
NRBC BLD AUTO-RTO: 0 /100 WBC (ref 0–0.2)
PLATELET # BLD AUTO: 323 10*3/MM3 (ref 140–450)
PMV BLD AUTO: 9.7 FL (ref 6–12)
POTASSIUM SERPL-SCNC: 3.8 MMOL/L (ref 3.5–5.2)
RBC # BLD AUTO: 4.27 10*6/MM3 (ref 3.77–5.28)
SODIUM SERPL-SCNC: 137 MMOL/L (ref 136–145)
WBC # BLD AUTO: 15.72 10*3/MM3 (ref 3.4–10.8)

## 2021-08-10 PROCEDURE — 25010000002 ENOXAPARIN PER 10 MG: Performed by: INTERNAL MEDICINE

## 2021-08-10 PROCEDURE — G0378 HOSPITAL OBSERVATION PER HR: HCPCS

## 2021-08-10 PROCEDURE — 25010000002 DEXAMETHASONE PER 1 MG: Performed by: INTERNAL MEDICINE

## 2021-08-10 PROCEDURE — 80048 BASIC METABOLIC PNL TOTAL CA: CPT | Performed by: INTERNAL MEDICINE

## 2021-08-10 PROCEDURE — 85025 COMPLETE CBC W/AUTO DIFF WBC: CPT | Performed by: INTERNAL MEDICINE

## 2021-08-10 RX ADMIN — SODIUM CHLORIDE 500 ML: 9 INJECTION, SOLUTION INTRAVENOUS at 14:55

## 2021-08-10 RX ADMIN — ZINC SULFATE 220 MG (50 MG) CAPSULE 220 MG: CAPSULE at 10:19

## 2021-08-10 RX ADMIN — ENOXAPARIN SODIUM 100 MG: 100 INJECTION SUBCUTANEOUS at 17:45

## 2021-08-10 RX ADMIN — ENOXAPARIN SODIUM 100 MG: 100 INJECTION SUBCUTANEOUS at 05:35

## 2021-08-10 RX ADMIN — THERA TABS 1 TABLET: TAB at 10:19

## 2021-08-10 RX ADMIN — SODIUM CHLORIDE, PRESERVATIVE FREE 10 ML: 5 INJECTION INTRAVENOUS at 20:35

## 2021-08-10 RX ADMIN — DEXAMETHASONE SODIUM PHOSPHATE 6 MG: 4 INJECTION, SOLUTION INTRAMUSCULAR; INTRAVENOUS at 10:19

## 2021-08-10 RX ADMIN — Medication 2000 UNITS: at 10:19

## 2021-08-10 RX ADMIN — OXYCODONE HYDROCHLORIDE AND ACETAMINOPHEN 500 MG: 500 TABLET ORAL at 10:19

## 2021-08-10 RX ADMIN — DULOXETINE HYDROCHLORIDE 60 MG: 60 CAPSULE, DELAYED RELEASE ORAL at 10:20

## 2021-08-10 RX ADMIN — SODIUM CHLORIDE, PRESERVATIVE FREE 10 ML: 5 INJECTION INTRAVENOUS at 10:20

## 2021-08-11 LAB
ANION GAP SERPL CALCULATED.3IONS-SCNC: 11 MMOL/L (ref 5–15)
BASOPHILS # BLD AUTO: 0.05 10*3/MM3 (ref 0–0.2)
BASOPHILS NFR BLD AUTO: 0.3 % (ref 0–1.5)
BUN SERPL-MCNC: 14 MG/DL (ref 8–23)
BUN/CREAT SERPL: 21.2 (ref 7–25)
CALCIUM SPEC-SCNC: 8.8 MG/DL (ref 8.6–10.5)
CHLORIDE SERPL-SCNC: 104 MMOL/L (ref 98–107)
CO2 SERPL-SCNC: 23 MMOL/L (ref 22–29)
CREAT SERPL-MCNC: 0.66 MG/DL (ref 0.57–1)
DEPRECATED RDW RBC AUTO: 48.2 FL (ref 37–54)
EOSINOPHIL # BLD AUTO: 0.18 10*3/MM3 (ref 0–0.4)
EOSINOPHIL NFR BLD AUTO: 1.1 % (ref 0.3–6.2)
ERYTHROCYTE [DISTWIDTH] IN BLOOD BY AUTOMATED COUNT: 14.2 % (ref 12.3–15.4)
GFR SERPL CREATININE-BSD FRML MDRD: 90 ML/MIN/1.73
GLUCOSE SERPL-MCNC: 110 MG/DL (ref 65–99)
HCT VFR BLD AUTO: 38 % (ref 34–46.6)
HGB BLD-MCNC: 12.4 G/DL (ref 12–15.9)
IMM GRANULOCYTES # BLD AUTO: 0.44 10*3/MM3 (ref 0–0.05)
IMM GRANULOCYTES NFR BLD AUTO: 2.6 % (ref 0–0.5)
LYMPHOCYTES # BLD AUTO: 3.74 10*3/MM3 (ref 0.7–3.1)
LYMPHOCYTES NFR BLD AUTO: 21.9 % (ref 19.6–45.3)
MCH RBC QN AUTO: 30.3 PG (ref 26.6–33)
MCHC RBC AUTO-ENTMCNC: 32.6 G/DL (ref 31.5–35.7)
MCV RBC AUTO: 92.9 FL (ref 79–97)
MONOCYTES # BLD AUTO: 1.42 10*3/MM3 (ref 0.1–0.9)
MONOCYTES NFR BLD AUTO: 8.3 % (ref 5–12)
NEUTROPHILS NFR BLD AUTO: 11.24 10*3/MM3 (ref 1.7–7)
NEUTROPHILS NFR BLD AUTO: 65.8 % (ref 42.7–76)
NRBC BLD AUTO-RTO: 0.1 /100 WBC (ref 0–0.2)
PLATELET # BLD AUTO: 263 10*3/MM3 (ref 140–450)
PMV BLD AUTO: 9.6 FL (ref 6–12)
POTASSIUM SERPL-SCNC: 3.8 MMOL/L (ref 3.5–5.2)
RBC # BLD AUTO: 4.09 10*6/MM3 (ref 3.77–5.28)
SODIUM SERPL-SCNC: 138 MMOL/L (ref 136–145)
WBC # BLD AUTO: 17.07 10*3/MM3 (ref 3.4–10.8)

## 2021-08-11 PROCEDURE — G0378 HOSPITAL OBSERVATION PER HR: HCPCS

## 2021-08-11 PROCEDURE — 25010000002 DEXAMETHASONE PER 1 MG: Performed by: INTERNAL MEDICINE

## 2021-08-11 PROCEDURE — 25010000002 AZITHROMYCIN PER 500 MG: Performed by: INTERNAL MEDICINE

## 2021-08-11 PROCEDURE — 80048 BASIC METABOLIC PNL TOTAL CA: CPT | Performed by: INTERNAL MEDICINE

## 2021-08-11 PROCEDURE — 25010000002 ENOXAPARIN PER 10 MG: Performed by: INTERNAL MEDICINE

## 2021-08-11 PROCEDURE — 85025 COMPLETE CBC W/AUTO DIFF WBC: CPT | Performed by: INTERNAL MEDICINE

## 2021-08-11 RX ADMIN — AZITHROMYCIN 500 MG: 500 INJECTION, POWDER, LYOPHILIZED, FOR SOLUTION INTRAVENOUS at 15:34

## 2021-08-11 RX ADMIN — ZINC SULFATE 220 MG (50 MG) CAPSULE 220 MG: CAPSULE at 09:56

## 2021-08-11 RX ADMIN — DEXAMETHASONE SODIUM PHOSPHATE 6 MG: 4 INJECTION, SOLUTION INTRAMUSCULAR; INTRAVENOUS at 09:57

## 2021-08-11 RX ADMIN — ENOXAPARIN SODIUM 100 MG: 100 INJECTION SUBCUTANEOUS at 04:56

## 2021-08-11 RX ADMIN — ENOXAPARIN SODIUM 100 MG: 100 INJECTION SUBCUTANEOUS at 17:09

## 2021-08-11 RX ADMIN — Medication 2000 UNITS: at 09:56

## 2021-08-11 RX ADMIN — SODIUM CHLORIDE, PRESERVATIVE FREE 10 ML: 5 INJECTION INTRAVENOUS at 10:21

## 2021-08-11 RX ADMIN — OXYCODONE HYDROCHLORIDE AND ACETAMINOPHEN 500 MG: 500 TABLET ORAL at 09:56

## 2021-08-11 RX ADMIN — DULOXETINE HYDROCHLORIDE 60 MG: 60 CAPSULE, DELAYED RELEASE ORAL at 09:56

## 2021-08-11 RX ADMIN — THERA TABS 1 TABLET: TAB at 09:57

## 2021-08-11 RX ADMIN — SODIUM CHLORIDE, PRESERVATIVE FREE 10 ML: 5 INJECTION INTRAVENOUS at 21:40

## 2021-08-12 LAB
ANION GAP SERPL CALCULATED.3IONS-SCNC: 12 MMOL/L (ref 5–15)
BASOPHILS # BLD AUTO: 0.08 10*3/MM3 (ref 0–0.2)
BASOPHILS NFR BLD AUTO: 0.5 % (ref 0–1.5)
BUN SERPL-MCNC: 15 MG/DL (ref 8–23)
BUN/CREAT SERPL: 23.8 (ref 7–25)
CALCIUM SPEC-SCNC: 9.2 MG/DL (ref 8.6–10.5)
CHLORIDE SERPL-SCNC: 99 MMOL/L (ref 98–107)
CO2 SERPL-SCNC: 27 MMOL/L (ref 22–29)
CREAT SERPL-MCNC: 0.63 MG/DL (ref 0.57–1)
DEPRECATED RDW RBC AUTO: 46.4 FL (ref 37–54)
EOSINOPHIL # BLD AUTO: 0.11 10*3/MM3 (ref 0–0.4)
EOSINOPHIL NFR BLD AUTO: 0.6 % (ref 0.3–6.2)
ERYTHROCYTE [DISTWIDTH] IN BLOOD BY AUTOMATED COUNT: 14 % (ref 12.3–15.4)
GFR SERPL CREATININE-BSD FRML MDRD: 95 ML/MIN/1.73
GLUCOSE SERPL-MCNC: 124 MG/DL (ref 65–99)
HCT VFR BLD AUTO: 39.8 % (ref 34–46.6)
HGB BLD-MCNC: 13.3 G/DL (ref 12–15.9)
IMM GRANULOCYTES # BLD AUTO: 0.65 10*3/MM3 (ref 0–0.05)
IMM GRANULOCYTES NFR BLD AUTO: 3.7 % (ref 0–0.5)
LYMPHOCYTES # BLD AUTO: 3.79 10*3/MM3 (ref 0.7–3.1)
LYMPHOCYTES NFR BLD AUTO: 21.7 % (ref 19.6–45.3)
MCH RBC QN AUTO: 30.5 PG (ref 26.6–33)
MCHC RBC AUTO-ENTMCNC: 33.4 G/DL (ref 31.5–35.7)
MCV RBC AUTO: 91.3 FL (ref 79–97)
MONOCYTES # BLD AUTO: 1.33 10*3/MM3 (ref 0.1–0.9)
MONOCYTES NFR BLD AUTO: 7.6 % (ref 5–12)
NEUTROPHILS NFR BLD AUTO: 11.54 10*3/MM3 (ref 1.7–7)
NEUTROPHILS NFR BLD AUTO: 65.9 % (ref 42.7–76)
NRBC BLD AUTO-RTO: 0.1 /100 WBC (ref 0–0.2)
PLATELET # BLD AUTO: 292 10*3/MM3 (ref 140–450)
PMV BLD AUTO: 9.9 FL (ref 6–12)
POTASSIUM SERPL-SCNC: 4 MMOL/L (ref 3.5–5.2)
RBC # BLD AUTO: 4.36 10*6/MM3 (ref 3.77–5.28)
SODIUM SERPL-SCNC: 138 MMOL/L (ref 136–145)
WBC # BLD AUTO: 17.5 10*3/MM3 (ref 3.4–10.8)

## 2021-08-12 PROCEDURE — 80048 BASIC METABOLIC PNL TOTAL CA: CPT | Performed by: INTERNAL MEDICINE

## 2021-08-12 PROCEDURE — 25010000002 ENOXAPARIN PER 10 MG: Performed by: INTERNAL MEDICINE

## 2021-08-12 PROCEDURE — 25010000002 AZITHROMYCIN PER 500 MG: Performed by: INTERNAL MEDICINE

## 2021-08-12 PROCEDURE — 85025 COMPLETE CBC W/AUTO DIFF WBC: CPT | Performed by: INTERNAL MEDICINE

## 2021-08-12 PROCEDURE — 25010000002 DEXAMETHASONE PER 1 MG: Performed by: INTERNAL MEDICINE

## 2021-08-12 RX ADMIN — DEXAMETHASONE SODIUM PHOSPHATE 6 MG: 4 INJECTION, SOLUTION INTRAMUSCULAR; INTRAVENOUS at 08:57

## 2021-08-12 RX ADMIN — DULOXETINE HYDROCHLORIDE 60 MG: 60 CAPSULE, DELAYED RELEASE ORAL at 08:57

## 2021-08-12 RX ADMIN — ZINC SULFATE 220 MG (50 MG) CAPSULE 220 MG: CAPSULE at 08:57

## 2021-08-12 RX ADMIN — BUTALBITAL, ACETAMINOPHEN, AND CAFFEINE 1 TABLET: 50; 325; 40 TABLET ORAL at 23:54

## 2021-08-12 RX ADMIN — OXYCODONE HYDROCHLORIDE AND ACETAMINOPHEN 500 MG: 500 TABLET ORAL at 08:57

## 2021-08-12 RX ADMIN — BUTALBITAL, ACETAMINOPHEN, AND CAFFEINE 1 TABLET: 50; 325; 40 TABLET ORAL at 17:46

## 2021-08-12 RX ADMIN — THERA TABS 1 TABLET: TAB at 08:57

## 2021-08-12 RX ADMIN — SODIUM CHLORIDE, PRESERVATIVE FREE 10 ML: 5 INJECTION INTRAVENOUS at 08:57

## 2021-08-12 RX ADMIN — Medication 2000 UNITS: at 08:57

## 2021-08-12 RX ADMIN — SODIUM CHLORIDE, PRESERVATIVE FREE 10 ML: 5 INJECTION INTRAVENOUS at 21:01

## 2021-08-12 RX ADMIN — ENOXAPARIN SODIUM 100 MG: 100 INJECTION SUBCUTANEOUS at 17:46

## 2021-08-12 RX ADMIN — ENOXAPARIN SODIUM 100 MG: 100 INJECTION SUBCUTANEOUS at 05:11

## 2021-08-12 RX ADMIN — AZITHROMYCIN 500 MG: 500 INJECTION, POWDER, LYOPHILIZED, FOR SOLUTION INTRAVENOUS at 12:30

## 2021-08-13 PROCEDURE — 25010000002 ENOXAPARIN PER 10 MG: Performed by: INTERNAL MEDICINE

## 2021-08-13 PROCEDURE — 63710000001 DIPHENHYDRAMINE PER 50 MG: Performed by: INTERNAL MEDICINE

## 2021-08-13 PROCEDURE — 25010000002 DEXAMETHASONE PER 1 MG: Performed by: INTERNAL MEDICINE

## 2021-08-13 PROCEDURE — 25010000002 METHYLPREDNISOLONE PER 125 MG: Performed by: INTERNAL MEDICINE

## 2021-08-13 PROCEDURE — 25010000002 MORPHINE PER 10 MG: Performed by: INTERNAL MEDICINE

## 2021-08-13 PROCEDURE — 25010000002 AZITHROMYCIN PER 500 MG: Performed by: INTERNAL MEDICINE

## 2021-08-13 RX ORDER — METHYLPREDNISOLONE SODIUM SUCCINATE 125 MG/2ML
60 INJECTION, POWDER, LYOPHILIZED, FOR SOLUTION INTRAMUSCULAR; INTRAVENOUS ONCE
Status: COMPLETED | OUTPATIENT
Start: 2021-08-13 | End: 2021-08-13

## 2021-08-13 RX ORDER — AZITHROMYCIN 250 MG/1
500 TABLET, FILM COATED ORAL
Status: DISCONTINUED | OUTPATIENT
Start: 2021-08-14 | End: 2021-08-15

## 2021-08-13 RX ORDER — METHYLPREDNISOLONE SODIUM SUCCINATE 125 MG/2ML
60 INJECTION, POWDER, LYOPHILIZED, FOR SOLUTION INTRAMUSCULAR; INTRAVENOUS ONCE
Status: DISCONTINUED | OUTPATIENT
Start: 2021-08-13 | End: 2021-08-13

## 2021-08-13 RX ORDER — FAMOTIDINE 10 MG/ML
20 INJECTION, SOLUTION INTRAVENOUS ONCE
Status: COMPLETED | OUTPATIENT
Start: 2021-08-13 | End: 2021-08-13

## 2021-08-13 RX ORDER — DIPHENHYDRAMINE HCL 25 MG
25 CAPSULE ORAL ONCE
Status: COMPLETED | OUTPATIENT
Start: 2021-08-13 | End: 2021-08-13

## 2021-08-13 RX ORDER — MORPHINE SULFATE 2 MG/ML
1 INJECTION, SOLUTION INTRAMUSCULAR; INTRAVENOUS ONCE
Status: COMPLETED | OUTPATIENT
Start: 2021-08-13 | End: 2021-08-13

## 2021-08-13 RX ORDER — ACETAMINOPHEN 325 MG/1
650 TABLET ORAL EVERY 4 HOURS PRN
Status: DISCONTINUED | OUTPATIENT
Start: 2021-08-13 | End: 2021-08-16 | Stop reason: HOSPADM

## 2021-08-13 RX ORDER — IBUPROFEN 400 MG/1
400 TABLET ORAL EVERY 6 HOURS PRN
Status: DISCONTINUED | OUTPATIENT
Start: 2021-08-13 | End: 2021-08-16 | Stop reason: HOSPADM

## 2021-08-13 RX ADMIN — IBUPROFEN 400 MG: 400 TABLET ORAL at 15:04

## 2021-08-13 RX ADMIN — ZINC SULFATE 220 MG (50 MG) CAPSULE 220 MG: CAPSULE at 10:53

## 2021-08-13 RX ADMIN — Medication 2000 UNITS: at 10:53

## 2021-08-13 RX ADMIN — ACETAMINOPHEN 650 MG: 325 TABLET, FILM COATED ORAL at 10:53

## 2021-08-13 RX ADMIN — SODIUM CHLORIDE, PRESERVATIVE FREE 10 ML: 5 INJECTION INTRAVENOUS at 10:54

## 2021-08-13 RX ADMIN — MORPHINE SULFATE 1 MG: 2 INJECTION, SOLUTION INTRAMUSCULAR; INTRAVENOUS at 15:04

## 2021-08-13 RX ADMIN — ACETAMINOPHEN 650 MG: 325 TABLET, FILM COATED ORAL at 19:46

## 2021-08-13 RX ADMIN — FAMOTIDINE 20 MG: 10 INJECTION INTRAVENOUS at 03:32

## 2021-08-13 RX ADMIN — DEXAMETHASONE SODIUM PHOSPHATE 6 MG: 4 INJECTION, SOLUTION INTRAMUSCULAR; INTRAVENOUS at 10:53

## 2021-08-13 RX ADMIN — METHYLPREDNISOLONE SODIUM SUCCINATE 60 MG: 125 INJECTION, POWDER, FOR SOLUTION INTRAMUSCULAR; INTRAVENOUS at 03:33

## 2021-08-13 RX ADMIN — DIPHENHYDRAMINE HYDROCHLORIDE 25 MG: 25 CAPSULE ORAL at 03:33

## 2021-08-13 RX ADMIN — AZITHROMYCIN 500 MG: 500 INJECTION, POWDER, LYOPHILIZED, FOR SOLUTION INTRAVENOUS at 15:56

## 2021-08-13 RX ADMIN — DULOXETINE HYDROCHLORIDE 60 MG: 60 CAPSULE, DELAYED RELEASE ORAL at 10:53

## 2021-08-13 RX ADMIN — Medication 3 MG: at 19:46

## 2021-08-13 RX ADMIN — IBUPROFEN 400 MG: 400 TABLET ORAL at 22:00

## 2021-08-13 RX ADMIN — ENOXAPARIN SODIUM 100 MG: 100 INJECTION SUBCUTANEOUS at 05:29

## 2021-08-13 RX ADMIN — APIXABAN 10 MG: 5 TABLET, FILM COATED ORAL at 18:02

## 2021-08-13 RX ADMIN — OXYCODONE HYDROCHLORIDE AND ACETAMINOPHEN 500 MG: 500 TABLET ORAL at 10:53

## 2021-08-13 RX ADMIN — THERA TABS 1 TABLET: TAB at 10:53

## 2021-08-14 PROCEDURE — 63710000001 DEXAMETHASONE PER 0.25 MG: Performed by: INTERNAL MEDICINE

## 2021-08-14 PROCEDURE — 63710000001 DIPHENHYDRAMINE PER 50 MG: Performed by: INTERNAL MEDICINE

## 2021-08-14 RX ORDER — DIPHENHYDRAMINE HCL 25 MG
25 CAPSULE ORAL EVERY 6 HOURS PRN
Status: DISCONTINUED | OUTPATIENT
Start: 2021-08-14 | End: 2021-08-16 | Stop reason: HOSPADM

## 2021-08-14 RX ORDER — DIPHENHYDRAMINE HCL 25 MG
25 CAPSULE ORAL ONCE
Status: COMPLETED | OUTPATIENT
Start: 2021-08-14 | End: 2021-08-14

## 2021-08-14 RX ADMIN — APIXABAN 10 MG: 5 TABLET, FILM COATED ORAL at 20:35

## 2021-08-14 RX ADMIN — APIXABAN 10 MG: 5 TABLET, FILM COATED ORAL at 09:19

## 2021-08-14 RX ADMIN — ZINC SULFATE 220 MG (50 MG) CAPSULE 220 MG: CAPSULE at 09:19

## 2021-08-14 RX ADMIN — Medication 2000 UNITS: at 09:19

## 2021-08-14 RX ADMIN — OXYCODONE HYDROCHLORIDE AND ACETAMINOPHEN 500 MG: 500 TABLET ORAL at 09:19

## 2021-08-14 RX ADMIN — DIPHENHYDRAMINE HYDROCHLORIDE 25 MG: 25 CAPSULE ORAL at 09:19

## 2021-08-14 RX ADMIN — DIPHENHYDRAMINE HYDROCHLORIDE 25 MG: 25 CAPSULE ORAL at 21:10

## 2021-08-14 RX ADMIN — AZITHROMYCIN MONOHYDRATE 500 MG: 250 TABLET ORAL at 17:52

## 2021-08-14 RX ADMIN — THERA TABS 1 TABLET: TAB at 09:19

## 2021-08-14 RX ADMIN — DEXAMETHASONE 6 MG: 2 TABLET ORAL at 09:19

## 2021-08-14 RX ADMIN — ACETAMINOPHEN 650 MG: 325 TABLET, FILM COATED ORAL at 01:49

## 2021-08-14 RX ADMIN — DULOXETINE HYDROCHLORIDE 60 MG: 60 CAPSULE, DELAYED RELEASE ORAL at 09:19

## 2021-08-15 PROCEDURE — 63710000001 DEXAMETHASONE PER 0.25 MG: Performed by: INTERNAL MEDICINE

## 2021-08-15 PROCEDURE — 63710000001 DIPHENHYDRAMINE PER 50 MG: Performed by: INTERNAL MEDICINE

## 2021-08-15 RX ADMIN — ZINC SULFATE 220 MG (50 MG) CAPSULE 220 MG: CAPSULE at 08:53

## 2021-08-15 RX ADMIN — DIPHENHYDRAMINE HYDROCHLORIDE 25 MG: 25 CAPSULE ORAL at 20:28

## 2021-08-15 RX ADMIN — DEXAMETHASONE 6 MG: 2 TABLET ORAL at 08:52

## 2021-08-15 RX ADMIN — THERA TABS 1 TABLET: TAB at 08:52

## 2021-08-15 RX ADMIN — APIXABAN 10 MG: 5 TABLET, FILM COATED ORAL at 20:29

## 2021-08-15 RX ADMIN — Medication 2000 UNITS: at 08:53

## 2021-08-15 RX ADMIN — DIPHENHYDRAMINE HYDROCHLORIDE 25 MG: 25 CAPSULE ORAL at 13:21

## 2021-08-15 RX ADMIN — DULOXETINE HYDROCHLORIDE 60 MG: 60 CAPSULE, DELAYED RELEASE ORAL at 08:53

## 2021-08-15 RX ADMIN — APIXABAN 10 MG: 5 TABLET, FILM COATED ORAL at 08:53

## 2021-08-15 RX ADMIN — OXYCODONE HYDROCHLORIDE AND ACETAMINOPHEN 500 MG: 500 TABLET ORAL at 08:54

## 2021-08-15 RX ADMIN — DIPHENHYDRAMINE HYDROCHLORIDE 25 MG: 25 CAPSULE ORAL at 02:46

## 2021-08-15 RX ADMIN — ACETAMINOPHEN 650 MG: 325 TABLET, FILM COATED ORAL at 06:45

## 2021-08-16 ENCOUNTER — HOME HEALTH ADMISSION (OUTPATIENT)
Dept: HOME HEALTH SERVICES | Facility: HOME HEALTHCARE | Age: 61
End: 2021-08-16

## 2021-08-16 ENCOUNTER — READMISSION MANAGEMENT (OUTPATIENT)
Dept: CALL CENTER | Facility: HOSPITAL | Age: 61
End: 2021-08-16

## 2021-08-16 ENCOUNTER — ANTICOAGULATION VISIT (OUTPATIENT)
Dept: CARDIAC SURGERY | Facility: CLINIC | Age: 61
End: 2021-08-16

## 2021-08-16 VITALS
TEMPERATURE: 98.4 F | SYSTOLIC BLOOD PRESSURE: 134 MMHG | HEIGHT: 63 IN | WEIGHT: 224 LBS | HEART RATE: 91 BPM | DIASTOLIC BLOOD PRESSURE: 75 MMHG | RESPIRATION RATE: 18 BRPM | OXYGEN SATURATION: 93 % | BODY MASS INDEX: 39.69 KG/M2

## 2021-08-16 DIAGNOSIS — I26.99 BILATERAL PULMONARY EMBOLISM (HCC): Primary | ICD-10-CM

## 2021-08-16 PROBLEM — D89.831 CYTOKINE RELEASE SYNDROME, GRADE 1: Status: ACTIVE | Noted: 2021-08-16

## 2021-08-16 PROCEDURE — 63710000001 DIPHENHYDRAMINE PER 50 MG: Performed by: INTERNAL MEDICINE

## 2021-08-16 RX ORDER — ACETAMINOPHEN 325 MG/1
650 TABLET ORAL EVERY 4 HOURS PRN
Start: 2021-08-16 | End: 2021-08-31

## 2021-08-16 RX ORDER — IBUPROFEN 400 MG/1
400 TABLET ORAL EVERY 6 HOURS PRN
Start: 2021-08-16 | End: 2021-08-31

## 2021-08-16 RX ADMIN — DULOXETINE HYDROCHLORIDE 60 MG: 60 CAPSULE, DELAYED RELEASE ORAL at 09:53

## 2021-08-16 RX ADMIN — THERA TABS 1 TABLET: TAB at 09:52

## 2021-08-16 RX ADMIN — DIPHENHYDRAMINE HYDROCHLORIDE 25 MG: 25 CAPSULE ORAL at 03:42

## 2021-08-16 RX ADMIN — APIXABAN 10 MG: 5 TABLET, FILM COATED ORAL at 09:52

## 2021-08-16 RX ADMIN — Medication 2000 UNITS: at 09:52

## 2021-08-16 RX ADMIN — OXYCODONE HYDROCHLORIDE AND ACETAMINOPHEN 500 MG: 500 TABLET ORAL at 09:53

## 2021-08-16 RX ADMIN — ZINC SULFATE 220 MG (50 MG) CAPSULE 220 MG: CAPSULE at 09:52

## 2021-08-16 RX ADMIN — DIPHENHYDRAMINE HYDROCHLORIDE 25 MG: 25 CAPSULE ORAL at 09:53

## 2021-08-16 RX ADMIN — DEXAMETHASONE 6 MG: 2 TABLET ORAL at 09:53

## 2021-08-16 NOTE — OUTREACH NOTE
Prep Survey      Responses   [facility] patient discharged from?  [place]   Is LACE score < 7 ?  No   Emergency Room discharge w/ pulse ox?  No   Eligibility  [facility]   Date of Admission  08/09/21   Date of Discharge  08/16/21   Discharge Disposition  Home-Health Care Svc   Discharge diagnosis  Acute hypoxic resp failure d/t COVID-19 PNA & bilateral pulonary embolism   Does the patient have one of the following disease processes/diagnoses(primary or secondary)?  COVID-19   Does the patient have Home health ordered?  Yes   What is the Home health agency?   [facility]   Is there a DME ordered?  Yes   What DME was ordered?  O2 from [facility]   Prep survey completed?  Yes          Gabriella Lu RN

## 2021-08-17 ENCOUNTER — TRANSITIONAL CARE MANAGEMENT TELEPHONE ENCOUNTER (OUTPATIENT)
Dept: CALL CENTER | Facility: HOSPITAL | Age: 61
End: 2021-08-17

## 2021-08-17 ENCOUNTER — HOME CARE VISIT (OUTPATIENT)
Dept: HOME HEALTH SERVICES | Facility: CLINIC | Age: 61
End: 2021-08-17

## 2021-08-17 NOTE — OUTREACH NOTE
Call Center TCM Note      Responses   [facility] patient discharged from?  [place]   Does the patient have one of the following disease processes/diagnoses(primary or secondary)?  COVID-19   COVID-19 underlying condition?  None   TCM attempt successful?  Yes   Call start time  1530   Call end time  1532   Discharge diagnosis  Acute hypoxic resp failure d/t COVID-19 PNA & bilateral pulonary embolism   Meds reviewed with patient/caregiver?  Yes   Is the patient having any side effects they believe may be caused by any medication additions or changes?  No   Does the patient have all medications ordered at discharge?  Yes   Is the patient taking all medications as directed (includes completed medication regime)?  Yes   Does the patient have a primary care provider?   Yes   Comments regarding PCP  appt with Dr. Mckeon on 8/25   Does the patient have an appointment with their PCP or specialist within 7 days of discharge?  Yes   Has the patient kept scheduled appointments due by today?  N/A   What is the Home health agency?   [facility]   Has home health visited the patient within 72 hours of discharge?  Call prior to 72 hours   What DME was ordered?  O2 from [facility]   Has all DME been delivered?  Yes   Psychosocial issues?  No   Did the patient receive a copy of their discharge instructions?  Yes   Did the patient receive a copy of COVID-19 specific instructions?  Yes   Nursing interventions  Reviewed instructions with patient   What is the patient's perception of their health status since discharge?  Improving   Does the patient have any of the following symptoms?  None   Nursing Interventions  Nurse provided patient education   Is the patient/caregiver able to teach back steps to recovery at home?  Rest and rebuild strength, gradually increase activity, Set small, achievable goals for return to baseline health   Is the patient/caregiver able to teach back the hierarchy of who to call/visit for symptoms/problems?  PCP, Specialist, Home health nurse, Urgent Care, ED, 911  Yes   TCM call completed?  Yes   Wrap up additional comments  Says she is doing well, no questions or concerns at this time, confirmed appt with PCP for 8/25.          Cony Augustine RN    8/17/2021, 15:32 EDT

## 2021-08-17 NOTE — CASE COMMUNICATION
Patient missed an admission visit from [facility] on 8/17/21.     Reason: Patient declines home health and states she does not need it.       For your records only.   As per home health protocol, MD must be notified of missed/cancelled visits; therefore the prescribed frequency was not met.

## 2021-08-18 ENCOUNTER — READMISSION MANAGEMENT (OUTPATIENT)
Dept: CALL CENTER | Facility: HOSPITAL | Age: 61
End: 2021-08-18

## 2021-08-18 ENCOUNTER — TELEPHONE (OUTPATIENT)
Dept: FAMILY MEDICINE CLINIC | Facility: CLINIC | Age: 61
End: 2021-08-18

## 2021-08-18 NOTE — TELEPHONE ENCOUNTER
Ms. David is wanting to speak with you about getting something to help her sleep. She has been taking Melatonin but that does not seem to help. She uses [facility] [address] if you could call something in.

## 2021-08-18 NOTE — OUTREACH NOTE
COVID-19 Week 1 Survey      Responses   [facility] patient discharged from?  [place]   Does the patient have one of the following disease processes/diagnoses(primary or secondary)?  COVID-19   COVID-19 underlying condition?  None   Call Number  Call 2   Week 1 Call successful?  Yes   Call start time  0950   Call end time  1010   Discharge diagnosis  Acute hypoxic resp failure d/t COVID-19 PNA & bilateral pulonary embolism   Is patient permission given to speak with other caregiver?  Yes   List who call center can speak with  sons   Meds reviewed with patient/caregiver?  Yes   Is the patient having any side effects they believe may be caused by any medication additions or changes?  No   Does the patient have all medications ordered at discharge?  Yes   Is the patient taking all medications as directed (includes completed medication regime)?  Yes   Has the patient kept scheduled appointments due by today?  N/A   What is the Home health agency?   [facility]   Has home health visited the patient within 72 hours of discharge?  Yes   Home health comments  they decided not to have HH after speaking with them via phone as they told her that they were only checking O2, which the pt is doing.   What DME was ordered?  O2 from [facility]   Has all DME been delivered?  Yes   Psychosocial issues?  No   Comments  Doing deep breathing and being up/about daily.   What is the patient's perception of their health status since discharge?  Same   Does the patient have any of the following symptoms?  Shortness of breath [body aches,  SOA during call/conversation.]   Pulse Ox monitoring  Intermittent   Pulse Ox device source  Patient   O2 Sat comments  91-93% sitting,  up walking 87% with O2   O2 Sat: education provided  Sat levels   O2 Sat education comments  Keep sats above 92% or return to ER.   Is the patient/caregiver able to teach back steps to recovery at home?  Rest and rebuild strength, gradually increase activity, Set small,  achievable goals for return to baseline health   If the patient is a current smoker, are they able to teach back resources for cessation?  Not a smoker   Is the patient/caregiver able to teach back the hierarchy of who to call/visit for symptoms/problems? PCP, Specialist, Home health nurse, Urgent Care, ED, 911  Yes   COVID-19 call completed?  Yes   Wrap up additional comments  MD told her to quarantine for 21 days          Juhi Olivier RN

## 2021-08-18 NOTE — TELEPHONE ENCOUNTER
Per Dr. Mckeon he will need to see patient first last appointment over 3 months ago has a Hospital follow up on 08/25

## 2021-08-19 ENCOUNTER — READMISSION MANAGEMENT (OUTPATIENT)
Dept: CALL CENTER | Facility: HOSPITAL | Age: 61
End: 2021-08-19

## 2021-08-19 NOTE — OUTREACH NOTE
COVID-19 Week 1 Survey      Responses   [facility], [place] patient discharged from?  [place]   Does the patient have one of the following disease processes/diagnoses(primary or secondary)?  COVID-19   COVID-19 underlying condition?  None   Call Number  Call 3   Week 1 Call successful?  Yes   Call start time  0905   Call end time  [phone]   Discharge diagnosis  Acute hypoxic resp failure d/t COVID-19 PNA & bilateral pulonary embolism          Patricia Padilla RN

## 2021-08-22 ENCOUNTER — READMISSION MANAGEMENT (OUTPATIENT)
Dept: CALL CENTER | Facility: HOSPITAL | Age: 61
End: 2021-08-22

## 2021-08-22 NOTE — OUTREACH NOTE
COVID-19 Week 1 Survey      Responses   [facility] patient discharged from?  [place]   Does the patient have one of the following disease processes/diagnoses(primary or secondary)?  COVID-19   COVID-19 underlying condition?  None   Call Number  Call 4   Week 1 Call successful?  No   Discharge diagnosis  Acute hypoxic resp failure d/t COVID-19 PNA & bilateral pulonary embolism          Mya Wu RN

## 2021-08-25 ENCOUNTER — TELEMEDICINE (OUTPATIENT)
Dept: FAMILY MEDICINE CLINIC | Facility: CLINIC | Age: 61
End: 2021-08-25

## 2021-08-25 ENCOUNTER — READMISSION MANAGEMENT (OUTPATIENT)
Dept: CALL CENTER | Facility: HOSPITAL | Age: 61
End: 2021-08-25

## 2021-08-25 VITALS — OXYGEN SATURATION: 97 % | HEART RATE: 82 BPM

## 2021-08-25 DIAGNOSIS — Z87.01 HISTORY OF PNEUMONIA: ICD-10-CM

## 2021-08-25 DIAGNOSIS — Z09 HOSPITAL DISCHARGE FOLLOW-UP: Primary | ICD-10-CM

## 2021-08-25 DIAGNOSIS — Z86.16 PERSONAL HISTORY OF COVID-19: ICD-10-CM

## 2021-08-25 PROCEDURE — 99214 OFFICE O/P EST MOD 30 MIN: CPT | Performed by: FAMILY MEDICINE

## 2021-08-25 NOTE — OUTREACH NOTE
COVID-19 Week 2 Survey      Responses   [facility] patient discharged from?  [place]   Does the patient have one of the following disease processes/diagnoses(primary or secondary)?  COVID-19   COVID-19 underlying condition?  None   Call Number  Call 1   COVID-19 Week 2: Call 1 attempt successful?  Yes   Call start time  0949   Call end time  1000   Discharge diagnosis  Acute hypoxic resp failure d/t COVID-19 PNA & bilateral pulonary embolism   Meds reviewed with patient/caregiver?  Yes   Is the patient having any side effects they believe may be caused by any medication additions or changes?  No   Does the patient have all medications ordered at discharge?  Yes   Is the patient taking all medications as directed (includes completed medication regime)?  Yes   Does the patient have a primary care provider?   Yes   Comments regarding PCP  Has a telehealth appt today    Does the patient have an appointment with their PCP or specialist within 7 days of discharge?  Greater than 7 days   Has the patient kept scheduled appointments due by today?  N/A   What is the Home health agency?   [facility]   Has home health visited the patient within 72 hours of discharge?  Yes   What DME was ordered?  O2 from [facility]   Has all DME been delivered?  Yes   Psychosocial issues?  No   Did the patient receive a copy of their discharge instructions?  Yes   Did the patient receive a copy of COVID-19 specific instructions?  Yes   Nursing interventions  Reviewed instructions with patient   What is the patient's perception of their health status since discharge?  Improving   Does the patient have any of the following symptoms?  Shortness of breath   Nursing Interventions  Nurse provided patient education   Pulse Ox monitoring  Intermittent   Pulse Ox device source  Patient   O2 Sat comments  Patient has sats running 93-94 with 02 at rest, still drops to 88-91 with activity.    O2 Sat: education provided  Sat levels, Monitoring  frequency, When to seek care   Is the patient/caregiver able to teach back steps to recovery at home?  Rest and rebuild strength, gradually increase activity, Set small, achievable goals for return to baseline health, Make a list of questions for provider's appointment   If the patient is a current smoker, are they able to teach back resources for cessation?  Not a smoker   Is the patient/caregiver able to teach back the hierarchy of who to call/visit for symptoms/problems? PCP, Specialist, Home health nurse, Urgent Care, ED, 911  Yes   COVID-19 call completed?  Yes          Hossein Kohler RN

## 2021-08-25 NOTE — PROGRESS NOTES
You have chosen to receive care through a telehealth visit.  Do you consent to use a video/audio connection for your medical care today? Yes    Subjective   Brenda David is a 61 y.o. female.   Cc: follow up of chronic medical issues    HPI  The patient is seen for hospital follow up.. She had COVID Pneumonia.She is still quite weak. She gets dyspneic with exertion.    The following portions of the patient's history were reviewed and updated as appropriate: allergies, current medications, past family history, past medical history, past social history, past surgical history and problem list.    Review of Systems   Constitutional: Positive for fatigue. Negative for fever.   HENT: Negative for congestion, postnasal drip, rhinorrhea, sinus pressure and sinus pain.    Respiratory: Positive for cough and shortness of breath. Negative for chest tightness.    Cardiovascular: Negative for palpitations and leg swelling.   Gastrointestinal: Negative for abdominal pain, constipation, diarrhea, nausea and vomiting.   Musculoskeletal: Positive for back pain. Negative for arthralgias and myalgias.   Neurological: Positive for headaches.       Objective   Physical Exam  Vitals reviewed.   Constitutional:       Appearance: Normal appearance.   HENT:      Head: Normocephalic and atraumatic.      Right Ear: External ear normal.      Left Ear: External ear normal.      Nose: Nose normal.      Mouth/Throat:      Mouth: Mucous membranes are moist.      Pharynx: Oropharynx is clear.   Eyes:      Extraocular Movements: Extraocular movements intact.      Pupils: Pupils are equal, round, and reactive to light.   Cardiovascular:      Rate and Rhythm: Normal rate.   Pulmonary:      Effort: Pulmonary effort is normal. No respiratory distress.   Abdominal:      Palpations: Abdomen is soft.      Tenderness: There is no abdominal tenderness.   Musculoskeletal:      Cervical back: Normal range of motion and neck supple.   Skin:     General:  Skin is warm and dry.   Neurological:      Mental Status: She is alert.           Visit Vitals  Pulse 82   LMP 07/02/2002 (Within Months)   SpO2 97%     There is no height or weight on file to calculate BMI.      Assessment/Plan   Diagnoses and all orders for this visit:    1. Hospital discharge follow-up (Primary)    2. Personal history of covid-19    3. History of pneumonia    Other orders  -     apixaban (ELIQUIS) 5 MG tablet tablet; Take 1 tablet by mouth Every 12 (Twelve) Hours. Indications: DVT/PE (active thrombosis)  Dispense: 180 tablet; Refill: 1    Return to the clinic in 3 month/s.  Will contact with results as needed.  This was an audio-video enabled telemedicine encounter-time spent was 20 minutes.

## 2021-08-31 ENCOUNTER — OFFICE VISIT (OUTPATIENT)
Dept: CARDIAC SURGERY | Facility: CLINIC | Age: 61
End: 2021-08-31

## 2021-08-31 ENCOUNTER — APPOINTMENT (OUTPATIENT)
Dept: LAB | Facility: HOSPITAL | Age: 61
End: 2021-08-31

## 2021-08-31 VITALS
WEIGHT: 224 LBS | HEIGHT: 63 IN | OXYGEN SATURATION: 98 % | HEART RATE: 97 BPM | SYSTOLIC BLOOD PRESSURE: 140 MMHG | BODY MASS INDEX: 39.69 KG/M2 | DIASTOLIC BLOOD PRESSURE: 78 MMHG

## 2021-08-31 DIAGNOSIS — Z71.89 ENCOUNTER FOR ANTICOAGULATION DISCUSSION AND COUNSELING: ICD-10-CM

## 2021-08-31 DIAGNOSIS — Z86.711 HISTORY OF PULMONARY EMBOLISM: ICD-10-CM

## 2021-08-31 DIAGNOSIS — I26.99 BILATERAL PULMONARY EMBOLISM (HCC): Primary | ICD-10-CM

## 2021-08-31 LAB — HCYS SERPL-MCNC: 7 UMOL/L (ref 0–15)

## 2021-08-31 PROCEDURE — 83090 ASSAY OF HOMOCYSTEINE: CPT | Performed by: NURSE PRACTITIONER

## 2021-08-31 PROCEDURE — 81241 F5 GENE: CPT | Performed by: NURSE PRACTITIONER

## 2021-08-31 PROCEDURE — 81240 F2 GENE: CPT | Performed by: NURSE PRACTITIONER

## 2021-08-31 PROCEDURE — 36415 COLL VENOUS BLD VENIPUNCTURE: CPT | Performed by: NURSE PRACTITIONER

## 2021-08-31 PROCEDURE — 99214 OFFICE O/P EST MOD 30 MIN: CPT | Performed by: NURSE PRACTITIONER

## 2021-08-31 NOTE — PROGRESS NOTES
CVTS Office Progress Note     2021    Brenda David  1960    Chief Complaint:    Chief Complaint   Patient presents with   • Anticoagulation       HPI:      PCP:  Papi Mckeon MD     61 y.o. female with Obesity(uncontrolled, increased risk cardiovascular events) neuropathy, prior meniscus tear.  former smoker.  Admit 2021 with worsening dyspnea and COVID pneumonia/hypoxia, CTA chest was obtained demonstrating bilateral pulmonary embolism, echo revealing right atrial dilation and elevated RVSP 45-55 range.  Referred to CTVS to follow pulmonary embolism.  Denies prior event or immediate family history of VTE.  Dyspnea is improving, she remains on anticoagulation with eliquis, O2 at 2L currently.  No other associated signs, symptoms or modifying factors.    2021 CTA chest: Bilateral pulmonary emboli  2021 TTE: EF 55%, moderate RA dialtion, EF 55%, RVSP 55, moderate dilation RV    The following portions of the patient's history were reviewed and updated as appropriate: allergies, current medications, past family history, past medical history, past social history, past surgical history and problem list.  Recent images independently reviewed.  Available laboratory values reviewed.    PMH:  Past Medical History:   Diagnosis Date   • Anxiety    • Arthritis    • History of     • Ingrown toenail    • Knee pain, bilateral    • Migraine    • Plantar fasciitis    • Wears glasses      Past Surgical History:   Procedure Laterality Date   • BACK SURGERY     • BREAST BIOPSY     •  SECTION     • HYSTERECTOMY     • KNEE ARTHROSCOPY Right 2018    Procedure: KNEE ARTHROSCOPY with debridement medial meniscus     (LATEX ALLERGY) ;  Surgeon: Carlos Manuel Sanchez MD;  Location: [facility] OR;  Service: Orthopedics   • KNEE ARTHROSCOPY Right 2019    Procedure: Right KNEE ARTHROSCOPY with debridement medial meniscus;  Surgeon: Carlos Manuel Sanchez MD;  Location:   [facility] OR;  Service: Orthopedics   • KNEE ARTHROSCOPY Right 12/2/2019    Procedure: KNEE ARTHROSCOPY with debridement medial meniscus;  Surgeon: Carlos Manuel Sanchez MD;  Location: [facility] OR;  Service: Orthopedics   • MTP JOINT FUSION Right 1/24/2018    Procedure: FIRST METATARSOPHALANGEAL JOINT ARTHRODESIS       (C-ARM)                  LATEX ALLERGY;  Surgeon: Kwadwo Jensen DPM;  Location: [facility] OR;  Service:    • SINUS SURGERY  1978     Family History   Problem Relation Age of Onset   • Heart disease Mother    • Hypertension Mother    • Thyroid disease Mother    • Heart disease Father    • Hypertension Father    • Thyroid disease Father    • Thyroid disease Sister    • Breast cancer Paternal Grandmother      Social History     Tobacco Use   • Smoking status: Former Smoker   • Smokeless tobacco: Never Used   Substance Use Topics   • Alcohol use: No   • Drug use: No       ALLERGIES:  Allergies   Allergen Reactions   • Fioricet [Butalbital-Apap-Caffeine] Hives   • Bactrim [Sulfamethoxazole-Trimethoprim] Hives   • Adhesive Tape Other (See Comments)     BLISTERS   • Amoxicillin-Pot Clavulanate Diarrhea   • Latex Rash   • Pregabalin Hives   • Sulfa Antibiotics Hives         MEDICATIONS:    Current Outpatient Medications:   •  apixaban (ELIQUIS) 5 MG tablet tablet, Take 1 tablet by mouth Every 12 (Twelve) Hours. Indications: DVT/PE (active thrombosis), Disp: 180 tablet, Rfl: 1  •  Cholecalciferol (VITAMIN D PO), Take 2,000 Units by mouth Daily., Disp: , Rfl:   •  DULoxetine (CYMBALTA) 60 MG capsule, Take 1 capsule by mouth Daily., Disp: 90 capsule, Rfl: 2  •  Multiple Vitamin (MULTI-VITAMIN DAILY PO), Take 1 tablet by mouth Daily., Disp: , Rfl:   •  Selenium (SELENIMIN PO), Take 1 tablet by mouth Daily., Disp: , Rfl:   •  vitamin C (ASCORBIC ACID) 500 MG tablet, Take 500 mg by mouth Daily. Time released, Disp: , Rfl:       Review of Systems   Constitutional: Negative for chills, decreased appetite, fever and  "weight loss.   HENT: Negative for congestion, nosebleeds and sore throat.    Eyes: Negative for blurred vision, visual disturbance and visual halos.   Cardiovascular: Positive for dyspnea on exertion. Negative for chest pain and leg swelling.   Respiratory: Positive for shortness of breath. Negative for cough, sputum production and wheezing.    Endocrine: Negative for cold intolerance and polyuria.   Hematologic/Lymphatic: Negative for bleeding problem. Does not bruise/bleed easily.   Skin: Negative for flushing, nail changes and unusual hair distribution.   Musculoskeletal: Positive for back pain and joint pain. Negative for arthritis.   Gastrointestinal: Negative for bloating, abdominal pain, hematemesis, melena, nausea and vomiting.   Genitourinary: Negative for flank pain and hematuria.   Neurological: Negative for brief paralysis, difficulty with concentration, focal weakness, light-headedness, loss of balance, numbness, paresthesias and weakness.   Psychiatric/Behavioral: Negative for altered mental status, depression, substance abuse and suicidal ideas.   Allergic/Immunologic: Negative for hives and persistent infections.         Vitals:    08/31/21 1302   BP: 140/78   BP Location: Left arm   Patient Position: Sitting   Cuff Size: Adult   Pulse: 97   SpO2: 98%   Weight: 102 kg (224 lb)   Height: 160 cm (63\")     Physical Exam  Vitals and nursing note reviewed.   HENT:      Head: Normocephalic.      Nose: Nose normal.      Mouth/Throat:      Mouth: Mucous membranes are moist.   Eyes:      Pupils: Pupils are equal, round, and reactive to light.   Cardiovascular:      Rate and Rhythm: Normal rate.      Pulses: Normal pulses.   Pulmonary:      Effort: Pulmonary effort is normal.      Comments: o2 at 2L  Abdominal:      General: Abdomen is flat.      Palpations: Abdomen is soft.   Musculoskeletal:         General: Normal range of motion.      Cervical back: Normal range of motion.   Skin:     General: Skin is warm " and dry.      Capillary Refill: Capillary refill takes 2 to 3 seconds.   Neurological:      General: No focal deficit present.      Mental Status: She is alert and oriented to person, place, and time.   Psychiatric:         Mood and Affect: Mood normal.         Assessment & Plan     Independent Review of Studies  8/2021 CTA chest: Bilateral pulmonary emboli  8/2021 TTE: EF 55%, moderate RA dialtion, EF 55%, RVSP 55, moderate dilation RV    1. Bilateral pulmonary embolism (CMS/HCC)  1st event, provoked secondary to COVID pneumonia    Anticoagulation x6 months. Repeat echocardiogram in 3 months to ensure RVSP comes down.      Preliminary hypercoagulable evaluation with factor V, factor II, homocysteine.  Completion of lupus anticoagulant, antiphospholipid syndrome, antithrombin III, and protein C/S studies to be performed after discontinuation of anticoagulation.     Extensive risks and benefits discussion regarding selection of anticoagulant.  Pro's/con's of DOAC vs. Vitamin K antagonist discussed. Patient understands and wishes to proceed with current plan.  Patient instructed to monitor for signs of adverse bleeding event not limited to but including hematemesis, hematuria, hematochezia.  In the event of trauma or fall patient is advised to undergo evaluation at nearest emergency department.        - Adult Transthoracic Echo Complete W/ Cont if Necessary Per Protocol; Future  - Factor 5 [place]; Future  - Factor II, DNA Analysis; Future  - Homocysteine, serum; Future  - Factor 5 [place]  - Factor II, DNA Analysis  - Homocysteine, serum    2. History of pulmonary embolism    - Homocysteine, serum; Future  - Homocysteine, serum    3. Encounter for anticoagulation discussion and counseling  Adverse bleeding events that require evaluation includes blood in the urine, stool, gums, and nose.  If you are to experience these symptoms you should present to the heart and vascular center for evaluation.  Avoid NSAID's such as  ibuprofen and naproxen for pain relief as these medications increase your risk of gastrointestinal bleeding.  Over the counter supplements such as garlic, gingko biloba, and other herbs may increase bleeding risks.       Detailed discussion regarding risks, benefits, and treatment plan. Images independently reviewed. Patient understands, agrees, and wishes to proceed with plan.       This document has been electronically signed by RICK [email or web address]-BC @  On August 31, 2021 14:37 CDT      EMR Dragon/Transcription disclaimer:   Part of this note may be an electronic transcription/translation of spoken language to printed text using the Dragon Dictation System.

## 2021-08-31 NOTE — PATIENT INSTRUCTIONS
Adverse bleeding events that require evaluation includes blood in the urine, stool, gums, and nose.  If you are to experience these symptoms you should present to the heart and vascular center for evaluation.  Avoid NSAID's such as ibuprofen and naproxen for pain relief as these medications increase your risk of gastrointestinal bleeding.  Over the counter supplements such as garlic, gingko biloba, and other herbs may increase bleeding risks. Do not stop your medication unless directed by a provider, take care in making sure you have adequate supply so that you are not without medication. If this medicine becomes unaffordable please contact heart and vascular center for evaluation before discontinuing.    Return 2 months Echocardiogram

## 2021-09-01 ENCOUNTER — READMISSION MANAGEMENT (OUTPATIENT)
Dept: CALL CENTER | Facility: HOSPITAL | Age: 61
End: 2021-09-01

## 2021-09-01 LAB
F5 GENE MUT ANL BLD/T: NORMAL
FACTOR II, DNA ANALYSIS: NORMAL

## 2021-09-01 NOTE — OUTREACH NOTE
COVID-19 Week 3 Survey      Responses   [facility] patient discharged from?  [place]   Does the patient have one of the following disease processes/diagnoses(primary or secondary)?  COVID-19   COVID-19 underlying condition?  None   Call Number  Call 1   COVID-19 Week 3: Call 1 attempt successful?  No          Trini Wells RN

## 2021-09-08 ENCOUNTER — READMISSION MANAGEMENT (OUTPATIENT)
Dept: CALL CENTER | Facility: HOSPITAL | Age: 61
End: 2021-09-08

## 2021-09-08 NOTE — OUTREACH NOTE
COVID-19 Week 4 Survey      Responses   [facility] patient discharged from?  [place]   Does the patient have one of the following disease processes/diagnoses(primary or secondary)?  COVID-19   COVID-19 underlying condition?  None   Call Number  Call 1   COVID-19 Week 4: Call 1 attempt successful?  Yes   Call start time  0757   Call end time  0804   Discharge diagnosis  Acute hypoxic resp failure d/t COVID-19 PNA & bilateral pulonary embolism   Meds reviewed with patient/caregiver?  Yes   Is the patient having any side effects they believe may be caused by any medication additions or changes?  No   Does the patient have all medications ordered at discharge?  Yes   Is the patient taking all medications as directed (includes completed medication regime)?  Yes   Has the patient kept scheduled appointments due by today?  Yes   Comments  Telehealth visit with PCP       Has seen Cardiology   What is the patient's perception of their health status since discharge?  Improving [Feels she is improving every day.]   Does the patient have any of the following symptoms?  Shortness of breath   Nursing Interventions  Nurse provided patient education   Pulse Ox monitoring  Intermittent   Pulse Ox device source  Patient   O2 Sat: education provided  Sat levels, When to seek care   Is the patient/caregiver able to teach back the hierarchy of who to call/visit for symptoms/problems? PCP, Specialist, Home health nurse, Urgent Care, ED, 911  Yes   Is the patient interested in additional calls from an ambulatory ?  NOTE:  applies to high risk patients requiring additional follow-up.  No   Did the patient feel the follow up calls were helpful during their recovery period?  Yes   Interested in COVID-19 Plasma Donation?  Yes          Mary Jane Dodge RN

## 2021-11-09 ENCOUNTER — OFFICE VISIT (OUTPATIENT)
Dept: CARDIAC SURGERY | Facility: CLINIC | Age: 61
End: 2021-11-09

## 2021-11-09 VITALS
WEIGHT: 247 LBS | SYSTOLIC BLOOD PRESSURE: 140 MMHG | DIASTOLIC BLOOD PRESSURE: 80 MMHG | HEIGHT: 63 IN | OXYGEN SATURATION: 97 % | BODY MASS INDEX: 43.77 KG/M2 | HEART RATE: 110 BPM

## 2021-11-09 DIAGNOSIS — I26.99 BILATERAL PULMONARY EMBOLISM (HCC): Primary | ICD-10-CM

## 2021-11-09 DIAGNOSIS — Z71.89 ENCOUNTER FOR ANTICOAGULATION DISCUSSION AND COUNSELING: ICD-10-CM

## 2021-11-09 PROCEDURE — 99214 OFFICE O/P EST MOD 30 MIN: CPT | Performed by: NURSE PRACTITIONER

## 2021-11-09 NOTE — PATIENT INSTRUCTIONS
Adverse bleeding events that require evaluation includes blood in the urine, stool, gums, and nose.  If you are to experience these symptoms you should present to the heart and vascular center for evaluation.  Avoid NSAID's such as ibuprofen and naproxen for pain relief as these medications increase your risk of gastrointestinal bleeding.  Over the counter supplements such as garlic, gingko biloba, and other herbs may increase bleeding risks.     Return 3 months CTA chest

## 2021-11-10 NOTE — PROGRESS NOTES
CVTS Office Progress Note     11/10/2021    Brenda David  1960    Chief Complaint:    Chief Complaint   Patient presents with   • Pulmonary Embolism       HPI:      PCP:  Papi Mckeon MD     61 y.o. female with Obesity(uncontrolled, increased risk cardiovascular events) neuropathy, prior meniscus tear.  former smoker.  Admit 2021 with worsening dyspnea and COVID pneumonia/hypoxia, CTA chest was obtained demonstrating bilateral pulmonary embolism, echo revealing right atrial dilation and elevated RVSP 45-55 range.  Referred to CTVS to follow pulmonary embolism.  Denies prior event or immediate family history of VTE.  Dyspnea is improving, she remains on anticoagulation with eliquis, O2 at 2L currently.  No other associated signs, symptoms or modifying factors.    2021 CTA chest: Bilateral pulmonary emboli  2021 TTE: EF 55%, moderate RA dialtion, EF 55%, RVSP 55, moderate dilation RV  2021 TTE: RV size reduced, function improved, RVSP <35, improved    The following portions of the patient's history were reviewed and updated as appropriate: allergies, current medications, past family history, past medical history, past social history, past surgical history and problem list.  Recent images independently reviewed.  Available laboratory values reviewed.    PMH:  Past Medical History:   Diagnosis Date   • Anxiety    • Arthritis    • History of     • Ingrown toenail    • Knee pain, bilateral    • Migraine    • Plantar fasciitis    • Wears glasses      Past Surgical History:   Procedure Laterality Date   • BACK SURGERY     • BREAST BIOPSY     •  SECTION     • HYSTERECTOMY     • KNEE ARTHROSCOPY Right 2018    Procedure: KNEE ARTHROSCOPY with debridement medial meniscus     (LATEX ALLERGY) ;  Surgeon: Carlos Manuel Sanchez MD;  Location: [facility];  Service: Orthopedics   • KNEE ARTHROSCOPY Right 2019    Procedure: Right KNEE ARTHROSCOPY with  debridement medial meniscus;  Surgeon: Carlos Manuel Sanchez MD;  Location: [facility];  Service: Orthopedics   • KNEE ARTHROSCOPY Right 12/2/2019    Procedure: KNEE ARTHROSCOPY with debridement medial meniscus;  Surgeon: Carlos Manuel Sanchez MD;  Location: [facility];  Service: Orthopedics   • MTP JOINT FUSION Right 1/24/2018    Procedure: FIRST METATARSOPHALANGEAL JOINT ARTHRODESIS       (C-ARM)                  LATEX ALLERGY;  Surgeon: Kwadwo Jensen DPM;  Location: [facility];  Service:    • SINUS SURGERY  1978     Family History   Problem Relation Age of Onset   • Heart disease Mother    • Hypertension Mother    • Thyroid disease Mother    • Heart disease Father    • Hypertension Father    • Thyroid disease Father    • Thyroid disease Sister    • Breast cancer Paternal Grandmother      Social History     Tobacco Use   • Smoking status: Former Smoker   • Smokeless tobacco: Never Used   Substance Use Topics   • Alcohol use: No   • Drug use: No       ALLERGIES:  Allergies   Allergen Reactions   • Fioricet [Butalbital-Apap-Caffeine] Hives   • Bactrim [Sulfamethoxazole-Trimethoprim] Hives   • Adhesive Tape Other (See Comments)     BLISTERS   • Amoxicillin-Pot Clavulanate Diarrhea   • Latex Rash   • Pregabalin Hives   • Sulfa Antibiotics Hives         MEDICATIONS:    Current Outpatient Medications:   •  apixaban (ELIQUIS) 5 MG tablet tablet, Take 1 tablet by mouth Every 12 (Twelve) Hours. Indications: DVT/PE (active thrombosis), Disp: 180 tablet, Rfl: 1  •  Cholecalciferol (VITAMIN D PO), Take 2,000 Units by mouth Daily., Disp: , Rfl:   •  DULoxetine (CYMBALTA) 60 MG capsule, Take 1 capsule by mouth Daily., Disp: 90 capsule, Rfl: 2  •  Multiple Vitamin (MULTI-VITAMIN DAILY PO), Take 1 tablet by mouth Daily., Disp: , Rfl:   •  Selenium (SELENIMIN PO), Take 1 tablet by mouth Daily., Disp: , Rfl:   •  vitamin C (ASCORBIC ACID) 500 MG tablet, Take 500 mg by mouth Daily. Time released, Disp: , Rfl:       Review of  "Systems   Constitutional: Negative for chills, decreased appetite, fever and weight loss.   HENT: Negative for congestion, nosebleeds and sore throat.    Eyes: Negative for blurred vision, visual disturbance and visual halos.   Cardiovascular: Negative for chest pain, dyspnea on exertion and leg swelling.   Respiratory: Negative for cough, shortness of breath, sputum production and wheezing.    Endocrine: Negative for cold intolerance and polyuria.   Hematologic/Lymphatic: Negative for bleeding problem. Does not bruise/bleed easily.   Skin: Negative for flushing, nail changes and unusual hair distribution.   Musculoskeletal: Positive for back pain and joint pain. Negative for arthritis.   Gastrointestinal: Negative for bloating, abdominal pain, hematemesis, melena, nausea and vomiting.   Genitourinary: Negative for flank pain and hematuria.   Neurological: Negative for brief paralysis, difficulty with concentration, focal weakness, light-headedness, loss of balance, numbness, paresthesias and weakness.   Psychiatric/Behavioral: Negative for altered mental status, depression, substance abuse and suicidal ideas.   Allergic/Immunologic: Negative for hives and persistent infections.         Vitals:    11/09/21 1257   BP: 140/80   BP Location: Left arm   Patient Position: Sitting   Cuff Size: Adult   Pulse: 110   SpO2: 97%   Weight: 112 kg (247 lb)   Height: 160 cm (63\")     Physical Exam  Vitals and nursing note reviewed.   HENT:      Head: Normocephalic.      Nose: Nose normal.      Mouth/Throat:      Mouth: Mucous membranes are moist.   Eyes:      Pupils: Pupils are equal, round, and reactive to light.   Cardiovascular:      Rate and Rhythm: Normal rate.      Pulses: Normal pulses.   Pulmonary:      Effort: Pulmonary effort is normal.      Comments: RA  Abdominal:      General: Abdomen is flat.      Palpations: Abdomen is soft.   Musculoskeletal:         General: Normal range of motion.      Cervical back: Normal " range of motion.   Skin:     General: Skin is warm and dry.      Capillary Refill: Capillary refill takes 2 to 3 seconds.   Neurological:      General: No focal deficit present.      Mental Status: She is alert and oriented to person, place, and time.   Psychiatric:         Mood and Affect: Mood normal.         Assessment & Plan     Independent Review of Studies  11/2021 TTE: RV size reduced, function improved, RVSP <35, improved    1. Bilateral pulmonary embolism (CMS/HCC)  1st event, provoked secondary to COVID pneumonia    Anticoagulation x6 months from event    Preliminary hypercoagulable evaluation with factor V, factor II, homocysteine negative.  Completion of lupus anticoagulant, antiphospholipid syndrome, antithrombin III, and protein C/S studies to be performed after discontinuation of anticoagulation.     Extensive risks and benefits discussion regarding selection of anticoagulant.  Pro's/con's of DOAC vs. Vitamin K antagonist discussed. Patient understands and wishes to proceed with current plan.  Patient instructed to monitor for signs of adverse bleeding event not limited to but including hematemesis, hematuria, hematochezia.  In the event of trauma or fall patient is advised to undergo evaluation at nearest emergency department.      CTA chest in 3 months    2. Encounter for anticoagulation discussion and counseling  Adverse bleeding events that require evaluation includes blood in the urine, stool, gums, and nose.  If you are to experience these symptoms you should present to the heart and vascular center for evaluation.  Avoid NSAID's such as ibuprofen and naproxen for pain relief as these medications increase your risk of gastrointestinal bleeding.  Over the counter supplements such as garlic, gingko biloba, and other herbs may increase bleeding risks.       Detailed discussion regarding risks, benefits, and treatment plan. Images independently reviewed. Patient understands, agrees, and wishes to  proceed with plan.       This document has been electronically signed by ALICE Nguyen @  On November 10, 2021 15:27 CST

## 2021-11-23 ENCOUNTER — OFFICE VISIT (OUTPATIENT)
Dept: FAMILY MEDICINE CLINIC | Facility: CLINIC | Age: 61
End: 2021-11-23

## 2021-11-23 VITALS
OXYGEN SATURATION: 98 % | HEIGHT: 63 IN | WEIGHT: 245.5 LBS | DIASTOLIC BLOOD PRESSURE: 80 MMHG | BODY MASS INDEX: 43.5 KG/M2 | HEART RATE: 98 BPM | SYSTOLIC BLOOD PRESSURE: 138 MMHG

## 2021-11-23 DIAGNOSIS — Z86.711 HISTORY OF PULMONARY EMBOLISM: Primary | ICD-10-CM

## 2021-11-23 DIAGNOSIS — R39.15 URINARY URGENCY: ICD-10-CM

## 2021-11-23 DIAGNOSIS — M79.2 NEUROPATHIC PAIN: ICD-10-CM

## 2021-11-23 PROCEDURE — 99214 OFFICE O/P EST MOD 30 MIN: CPT | Performed by: FAMILY MEDICINE

## 2021-11-23 RX ORDER — TOLTERODINE 2 MG/1
2 CAPSULE, EXTENDED RELEASE ORAL DAILY
Qty: 90 CAPSULE | Refills: 1 | Status: SHIPPED | OUTPATIENT
Start: 2021-11-23 | End: 2021-12-03

## 2021-11-23 RX ORDER — DULOXETIN HYDROCHLORIDE 60 MG/1
60 CAPSULE, DELAYED RELEASE ORAL DAILY
Qty: 90 CAPSULE | Refills: 2 | Status: SHIPPED | OUTPATIENT
Start: 2021-11-23 | End: 2022-08-05 | Stop reason: SDUPTHER

## 2021-11-23 NOTE — PROGRESS NOTES
Subjective   Brenda David is a 61 y.o. female.   Cc: follow up of chronic medical issues    HPI  The patient comes in for  Follow up of Pulmonary Embolism. She is on Apixaban and it is helping.  She has a history of Neuropathy. She is on Duloxetine and it helps with the pain.  She has been having a history of urinary urgency. She has to urinate immediately when she gets home. This has been going on for some time.  The following portions of the patient's history were reviewed and updated as appropriate: allergies, current medications, past family history, past medical history, past social history, past surgical history and problem list.    Review of Systems   Constitutional: Negative for fatigue and fever.   Respiratory: Negative for cough, chest tightness and shortness of breath.    Cardiovascular: Negative for chest pain, palpitations and leg swelling.   Genitourinary: Positive for dysuria, frequency and urgency. Negative for decreased urine volume, difficulty urinating, flank pain, genital sores, hematuria, pelvic pain, vaginal bleeding, vaginal discharge and vaginal pain.       Objective   Physical Exam  Vitals reviewed.   Constitutional:       Appearance: Normal appearance.   HENT:      Head: Normocephalic and atraumatic.      Right Ear: Tympanic membrane, ear canal and external ear normal.      Left Ear: Tympanic membrane, ear canal and external ear normal.      Nose: Nose normal.      Mouth/Throat:      Mouth: Mucous membranes are moist.      Pharynx: Oropharynx is clear.   Cardiovascular:      Rate and Rhythm: Normal rate and regular rhythm.      Heart sounds: Normal heart sounds. No murmur heard.  No friction rub. No gallop.    Pulmonary:      Effort: Pulmonary effort is normal. No respiratory distress.      Breath sounds: Normal breath sounds. No stridor. No wheezing, rhonchi or rales.   Chest:      Chest wall: No tenderness.   Abdominal:      General: Abdomen is flat. Bowel sounds are normal. There  "is no distension.      Palpations: Abdomen is soft. There is no mass.      Tenderness: There is no abdominal tenderness. There is no guarding or rebound.      Hernia: No hernia is present.   Musculoskeletal:      Cervical back: Normal range of motion.   Skin:     General: Skin is warm and dry.   Neurological:      Mental Status: She is alert.           Visit Vitals  /80   Pulse 98   Ht 160 cm (63\")   Wt 111 kg (245 lb 8 oz)   LMP 07/02/2002 (Within Months)   SpO2 98%   Breastfeeding No   BMI 43.49 kg/m²     Body mass index is 43.49 kg/m².      Assessment/Plan   Diagnoses and all orders for this visit:    1. History of pulmonary embolism (Primary)    2. Neuropathic pain    3. Urinary urgency    Other orders  -     DULoxetine (CYMBALTA) 60 MG capsule; Take 1 capsule by mouth Daily.  Dispense: 90 capsule; Refill: 2  -     apixaban (ELIQUIS) 5 MG tablet tablet; Take 1 tablet by mouth Every 12 (Twelve) Hours. Indications: DVT/PE (active thrombosis)  Dispense: 180 tablet; Refill: 1  -     tolterodine LA (Detrol LA) 2 MG 24 hr capsule; Take 1 capsule by mouth Daily.  Dispense: 90 capsule; Refill: 1    Return to the clinic in 6 month/s.  Will contact with results as needed.  I reviewed the CBC and BMP  with the patient..    "

## 2021-12-02 DIAGNOSIS — I26.99 BILATERAL PULMONARY EMBOLISM (HCC): Primary | ICD-10-CM

## 2021-12-03 ENCOUNTER — TELEPHONE (OUTPATIENT)
Dept: FAMILY MEDICINE CLINIC | Facility: CLINIC | Age: 61
End: 2021-12-03

## 2021-12-03 RX ORDER — SOLIFENACIN SUCCINATE 5 MG/1
5 TABLET, FILM COATED ORAL DAILY
Qty: 90 TABLET | Refills: 1 | Status: SHIPPED | OUTPATIENT
Start: 2021-12-03 | End: 2021-12-22

## 2021-12-03 NOTE — TELEPHONE ENCOUNTER
Ms. Wilson called to see if Dr Mckeon could call in Solifenacin to [facility]. She states that the detrol he called in is too expensive and the pharmacy recommended the Solifenacin. Please call pt back @ [phone]

## 2021-12-22 ENCOUNTER — OFFICE VISIT (OUTPATIENT)
Dept: PODIATRY | Facility: CLINIC | Age: 61
End: 2021-12-22

## 2021-12-22 VITALS — HEART RATE: 83 BPM | WEIGHT: 245 LBS | OXYGEN SATURATION: 98 % | BODY MASS INDEX: 43.41 KG/M2 | HEIGHT: 63 IN

## 2021-12-22 DIAGNOSIS — M72.2 PLANTAR FASCIITIS: ICD-10-CM

## 2021-12-22 DIAGNOSIS — M79.671 FOOT PAIN, BILATERAL: Primary | ICD-10-CM

## 2021-12-22 DIAGNOSIS — G57.93 NEUROPATHY OF BOTH FEET: ICD-10-CM

## 2021-12-22 DIAGNOSIS — M79.672 FOOT PAIN, BILATERAL: Primary | ICD-10-CM

## 2021-12-22 PROCEDURE — 99203 OFFICE O/P NEW LOW 30 MIN: CPT | Performed by: PODIATRIST

## 2021-12-22 RX ORDER — MELOXICAM 15 MG/1
15 TABLET ORAL DAILY
Qty: 30 TABLET | Refills: 0 | Status: SHIPPED | OUTPATIENT
Start: 2021-12-22 | End: 2022-08-05

## 2021-12-22 NOTE — PROGRESS NOTES
Brenda David  1960  61 y.o. female    Bilateral foot pain    2021    Chief Complaint   Patient presents with   • Left Foot - Pain   • Right Foot - Pain       History of Present Illness    Brenda David is a 61 y.o.female who presents to clinic with chief complaint of foot pain.  Patient describes pain to the bottom of both feet.  She describes it as burning and tingling pain.  She also relates to sharp pain to the bottom of her right heel.  States that she has struggled with plantar fasciitis in the past.  Burning and tingling pain has been present for greater than 1 year.  She does take Cymbalta which has helped.    Past Medical History:   Diagnosis Date   • Anxiety    • Arthritis    • Clotting disorder (HCC)    • History of     • Ingrown toenail    • Knee pain, bilateral    • Migraine    • Plantar fasciitis    • Wears glasses          Past Surgical History:   Procedure Laterality Date   • BACK SURGERY     • BREAST BIOPSY     •  SECTION     • HYSTERECTOMY     • KNEE ARTHROSCOPY Right 2018    Procedure: KNEE ARTHROSCOPY with debridement medial meniscus     (LATEX ALLERGY) ;  Surgeon: Carlos Manuel Sanchez MD;  Location: [facility];  Service: Orthopedics   • KNEE ARTHROSCOPY Right 2019    Procedure: Right KNEE ARTHROSCOPY with debridement medial meniscus;  Surgeon: Carlos Manuel Sanchez MD;  Location: [facility];  Service: Orthopedics   • KNEE ARTHROSCOPY Right 2019    Procedure: KNEE ARTHROSCOPY with debridement medial meniscus;  Surgeon: Carlos Manuel Sanchez MD;  Location: [facility];  Service: Orthopedics   • MTP JOINT FUSION Right 2018    Procedure: FIRST METATARSOPHALANGEAL JOINT ARTHRODESIS       (C-ARM)                  LATEX ALLERGY;  Surgeon: Kwadwo Jensen DPM;  Location: [facility] OR;  Service:    • SINUS SURGERY           Family History   Problem Relation Age of Onset   • Heart disease Mother    • Hypertension Mother    •  "Thyroid disease Mother    • Heart disease Father    • Hypertension Father    • Thyroid disease Father    • Thyroid disease Sister    • Breast cancer Paternal Grandmother        Allergies   Allergen Reactions   • Fioricet [Butalbital-Apap-Caffeine] Hives   • Bactrim [Sulfamethoxazole-Trimethoprim] Hives   • Adhesive Tape Other (See Comments)     BLISTERS   • Amoxicillin-Pot Clavulanate Diarrhea   • Latex Rash   • Pregabalin Hives   • Sulfa Antibiotics Hives       Social History     Socioeconomic History   • Marital status:    Tobacco Use   • Smoking status: Former Smoker   • Smokeless tobacco: Never Used   Substance and Sexual Activity   • Alcohol use: No   • Drug use: No   • Sexual activity: Defer         Current Outpatient Medications   Medication Sig Dispense Refill   • apixaban (ELIQUIS) 5 MG tablet tablet Take 1 tablet by mouth Every 12 (Twelve) Hours. Indications: DVT/PE (active thrombosis) 180 tablet 1   • Cholecalciferol (VITAMIN D PO) Take 2,000 Units by mouth Daily.     • DULoxetine (CYMBALTA) 60 MG capsule Take 1 capsule by mouth Daily. 90 capsule 2   • Multiple Vitamin (MULTI-VITAMIN DAILY PO) Take 1 tablet by mouth Daily.     • vitamin C (ASCORBIC ACID) 500 MG tablet Take 500 mg by mouth Daily. Time released     • meloxicam (MOBIC) 15 MG tablet Take 1 tablet by mouth Daily. 30 tablet 0     No current facility-administered medications for this visit.       Review of Systems   Constitutional: Negative.    HENT: Negative.    Eyes: Negative.    Respiratory: Negative.    Cardiovascular: Negative.    Gastrointestinal: Negative.    Endocrine: Negative.    Genitourinary: Negative.    Musculoskeletal:        Foot pain   Skin: Negative.    Allergic/Immunologic: Negative.    Neurological: Negative.    Hematological: Negative.    Psychiatric/Behavioral: Negative.          OBJECTIVE    Pulse 83   Ht 160 cm (63\")   Wt 111 kg (245 lb)   LMP 07/02/2002 (Within Months)   SpO2 98%   BMI 43.40 kg/m² "     Physical Exam  Vitals reviewed.   Constitutional:       General: She is not in acute distress.     Appearance: She is well-developed.   HENT:      Head: Normocephalic and atraumatic.      Nose: Nose normal.   Eyes:      Conjunctiva/sclera: Conjunctivae normal.      Pupils: Pupils are equal, round, and reactive to light.   Pulmonary:      Effort: Pulmonary effort is normal. No respiratory distress.      Breath sounds: No wheezing.   Musculoskeletal:         General: Tenderness present. No deformity. Normal range of motion.   Skin:     General: Skin is warm and dry.      Capillary Refill: Capillary refill takes less than 2 seconds.   Neurological:      Mental Status: She is alert and oriented to person, place, and time.   Psychiatric:         Behavior: Behavior normal.         Thought Content: Thought content normal.          Lower Extremity Exam:     Cardiovascular:    DP pulse palpable b/l  Pt pulse palpable b/l  CFT brisk to all digits b/l  Edema noted to bilateral lower extremities  Musculoskeletal:  Muscle strength is WNL bilateral  ROM of the 1st MTP is WNL bilateral  ROM of the ankle joint is WNL bilateral  Pain on palpation to the medial tubercle of the right calcaneus.  Negative squeeze test.  Dermatological:   Skin warm, dry and intact b/l  Webspaces 1-4 b/l are clean, dry and intact.   No subcutaneous nodules or masses noted  b/l  Neurological:   Protective sensation intact 10/10 sites b/l  Sensation intact to light touch b/l       Foot/Ankle Exam        Procedures        ASSESSMENT AND PLAN    Diagnoses and all orders for this visit:    1. Foot pain, bilateral (Primary)    2. Plantar fasciitis    3. Neuropathy of both feet  -     Ambulatory Referral to Neurology    Other orders  -     meloxicam (MOBIC) 15 MG tablet; Take 1 tablet by mouth Daily.  Dispense: 30 tablet; Refill: 0        - Comprehensive foot and ankle exam performed  - X-rays  reviewed.  No acute osseous or articular abnormalities.  - Rx  for mobic.  Patient advised on bleeding risk associated with taking Mobic and Eliquis.  - Patient advised to stretch, ice and to make appropriate shoe gear changes to include wearing athletic type shoes with supportive insoles. Patient was given written instructions on how to correctly perform the stretching of the Achilles tendon/calf stretches, and the heel spur/plantar fasciitis regimen. Limit bare foot walking.    - Recommended over-the-counter insole such as power steps, spenco or walk fit  to properly support the arch in order to alleviate the tension and stress on the plantar fascia associated with normal daily walking. Patient was educated on the break-in period for new arch supports.  -Referral to neurology for evaluation and treatment of neuropathic pain.  Patient has history of spinal surgery.  - All questions were answered to the patient's satisfaction.  - RTC in 6-8 weeks           This document has been electronically signed by Librado Sandoval DPM on December 22, 2021 15:12 CST     12/22/2021  15:12 CST

## 2021-12-23 ENCOUNTER — TELEPHONE (OUTPATIENT)
Dept: PODIATRY | Facility: CLINIC | Age: 61
End: 2021-12-23

## 2021-12-23 NOTE — TELEPHONE ENCOUNTER
PT REQUESTS A CALL BACK RE WAS CONTACTED BY A NEUROLOGIST IN [place] FROM THE REFERRAL WHICH WAS PUT IN FROM  DR ROSEN.  PT SAID SHE WANTS TO GO TO A NEUROLOGIST IN [place] BECAUSE [place] IS TOO FAR FOR HER TO TRAVEL.  CALL BACK # [phone]. THANK YOU.

## 2022-01-16 ENCOUNTER — NURSE TRIAGE (OUTPATIENT)
Dept: CALL CENTER | Facility: HOSPITAL | Age: 62
End: 2022-01-16

## 2022-01-16 NOTE — TELEPHONE ENCOUNTER
"    Reason for Disposition  • Information only question and nurse able to answer    Additional Information  • Negative: Nursing judgment  • Negative: Nursing judgment  • Negative: Nursing judgment  • Negative: Nursing judgment    Answer Assessment - Initial Assessment Questions  1. REASON FOR CALL or QUESTION: \"What is your reason for calling today?\" or \"How can I best help you?\" or \"What question do you have that I can help answer?\"      covid 6 months ago; now children have covid and she wants to go and help them; wants to know if she is at higher risk for getting covid again; advised if it is a different variant, she could get the new variant.    Protocols used: NO PROTOCOL AVAILABLE - INFORMATION ONLY-ADULT-[place]      "

## 2022-02-11 ENCOUNTER — LAB (OUTPATIENT)
Dept: LAB | Facility: HOSPITAL | Age: 62
End: 2022-02-11

## 2022-02-11 ENCOUNTER — HOSPITAL ENCOUNTER (OUTPATIENT)
Dept: CT IMAGING | Facility: HOSPITAL | Age: 62
Discharge: HOME | End: 2022-02-11

## 2022-02-11 DIAGNOSIS — I26.99 BILATERAL PULMONARY EMBOLISM: ICD-10-CM

## 2022-02-11 LAB
ANION GAP SERPL CALCULATED.3IONS-SCNC: 11 MMOL/L (ref 5–15)
BUN SERPL-MCNC: 20 MG/DL (ref 8–23)
BUN/CREAT SERPL: 25 (ref 7–25)
CALCIUM SPEC-SCNC: 9.7 MG/DL (ref 8.6–10.5)
CHLORIDE SERPL-SCNC: 104 MMOL/L (ref 98–107)
CO2 SERPL-SCNC: 24 MMOL/L (ref 22–29)
CREAT SERPL-MCNC: 0.8 MG/DL (ref 0.57–1)
GFR SERPL CREATININE-BSD FRML MDRD: 72 ML/MIN/1.73
GLUCOSE SERPL-MCNC: 115 MG/DL (ref 65–99)
POTASSIUM SERPL-SCNC: 4.4 MMOL/L (ref 3.5–5.2)
SODIUM SERPL-SCNC: 139 MMOL/L (ref 136–145)

## 2022-02-11 PROCEDURE — 80048 BASIC METABOLIC PNL TOTAL CA: CPT

## 2022-02-11 PROCEDURE — 0 IOPAMIDOL PER 1 ML: Performed by: NURSE PRACTITIONER

## 2022-02-11 PROCEDURE — 71275 CT ANGIOGRAPHY CHEST: CPT

## 2022-02-11 PROCEDURE — 36415 COLL VENOUS BLD VENIPUNCTURE: CPT

## 2022-02-11 RX ADMIN — IOPAMIDOL 90 ML: 755 INJECTION, SOLUTION INTRAVENOUS at 09:36

## 2022-02-14 ENCOUNTER — OFFICE VISIT (OUTPATIENT)
Dept: CARDIAC SURGERY | Facility: CLINIC | Age: 62
End: 2022-02-14

## 2022-02-14 VITALS
DIASTOLIC BLOOD PRESSURE: 78 MMHG | SYSTOLIC BLOOD PRESSURE: 134 MMHG | BODY MASS INDEX: 43.41 KG/M2 | OXYGEN SATURATION: 98 % | HEIGHT: 63 IN | HEART RATE: 92 BPM | WEIGHT: 245 LBS

## 2022-02-14 DIAGNOSIS — I26.99 BILATERAL PULMONARY EMBOLISM: Primary | ICD-10-CM

## 2022-02-14 DIAGNOSIS — Z71.89 ENCOUNTER FOR ANTICOAGULATION DISCUSSION AND COUNSELING: ICD-10-CM

## 2022-02-14 DIAGNOSIS — I82.90 CLOT: ICD-10-CM

## 2022-02-14 PROCEDURE — 99214 OFFICE O/P EST MOD 30 MIN: CPT | Performed by: NURSE PRACTITIONER

## 2022-02-14 RX ORDER — ASPIRIN 81 MG/1
81 TABLET, CHEWABLE ORAL DAILY
COMMUNITY

## 2022-02-14 NOTE — PATIENT INSTRUCTIONS
Discontinue eliquis    After 4 weeks return to office to register for labs.  Will call with results.

## 2022-02-14 NOTE — PROGRESS NOTES
CVTS Office Progress Note     2022    Brenda David  1960    Chief Complaint:    Chief Complaint   Patient presents with   • Pulmonary Embolism       HPI:      PCP:  Papi Mckeon MD     61 y.o. female with Obesity(uncontrolled, increased risk cardiovascular events) neuropathy, prior meniscus tear.  former smoker.  Admit 2021 with worsening dyspnea and COVID pneumonia/hypoxia, CTA chest was obtained demonstrating bilateral pulmonary embolism, echo revealing right atrial dilation and elevated RVSP 45-55 range.  Referred to CTVS to follow pulmonary embolism.  Denies prior event or immediate family history of VTE.  Dyspnea has resolved.  Room air.  No other associated signs, symptoms or modifying factors.    2021 CTA chest: Bilateral pulmonary emboli  2021 TTE: EF 55%, moderate RA dialtion, EF 55%, RVSP 55, moderate dilation RV  2021 TTE: RV size reduced, function improved, RVSP <35, improved  2022 CTA Chest: Negative for PE    The following portions of the patient's history were reviewed and updated as appropriate: allergies, current medications, past family history, past medical history, past social history, past surgical history and problem list.  Recent images independently reviewed.  Available laboratory values reviewed.    PMH:  Past Medical History:   Diagnosis Date   • Anxiety    • Arthritis    • Clotting disorder (HCC)    • History of     • Ingrown toenail    • Knee pain, bilateral    • Migraine    • Plantar fasciitis    • Wears glasses      Past Surgical History:   Procedure Laterality Date   • BACK SURGERY     • BREAST BIOPSY     •  SECTION     • HYSTERECTOMY     • KNEE ARTHROSCOPY Right 2018    Procedure: KNEE ARTHROSCOPY with debridement medial meniscus     (LATEX ALLERGY) ;  Surgeon: Carlos Manuel Sanchez MD;  Location: Health system;  Service: Orthopedics   • KNEE ARTHROSCOPY Right 2019    Procedure: Right KNEE ARTHROSCOPY with  debridement medial meniscus;  Surgeon: Carlos Manuel Sanchez MD;  Location: [facility];  Service: Orthopedics   • KNEE ARTHROSCOPY Right 12/2/2019    Procedure: KNEE ARTHROSCOPY with debridement medial meniscus;  Surgeon: Carlos Manuel Sanchez MD;  Location: [facility] OR;  Service: Orthopedics   • MTP JOINT FUSION Right 1/24/2018    Procedure: FIRST METATARSOPHALANGEAL JOINT ARTHRODESIS       (C-ARM)                  LATEX ALLERGY;  Surgeon: Kwadwo Jensen DPM;  Location: [facility];  Service:    • SINUS SURGERY  1978     Family History   Problem Relation Age of Onset   • Heart disease Mother    • Hypertension Mother    • Thyroid disease Mother    • Heart disease Father    • Hypertension Father    • Thyroid disease Father    • Thyroid disease Sister    • Breast cancer Paternal Grandmother      Social History     Tobacco Use   • Smoking status: Former Smoker   • Smokeless tobacco: Never Used   Substance Use Topics   • Alcohol use: No   • Drug use: No       ALLERGIES:  Allergies   Allergen Reactions   • Fioricet [Butalbital-Apap-Caffeine] Hives   • Bactrim [Sulfamethoxazole-Trimethoprim] Hives   • Adhesive Tape Other (See Comments)     BLISTERS   • Amoxicillin-Pot Clavulanate Diarrhea   • Latex Rash   • Pregabalin Hives   • Sulfa Antibiotics Hives         MEDICATIONS:    Current Outpatient Medications:   •  aspirin 81 MG chewable tablet, Chew 81 mg Daily., Disp: , Rfl:   •  Cholecalciferol (VITAMIN D PO), Take 2,000 Units by mouth Daily., Disp: , Rfl:   •  DULoxetine (CYMBALTA) 60 MG capsule, Take 1 capsule by mouth Daily., Disp: 90 capsule, Rfl: 2  •  meloxicam (MOBIC) 15 MG tablet, Take 1 tablet by mouth Daily., Disp: 30 tablet, Rfl: 0  •  Multiple Vitamin (MULTI-VITAMIN DAILY PO), Take 1 tablet by mouth Daily., Disp: , Rfl:   •  vitamin C (ASCORBIC ACID) 500 MG tablet, Take 500 mg by mouth Daily. Time released, Disp: , Rfl:       Review of Systems   Constitutional: Negative for chills, decreased appetite, fever  "and weight loss.   HENT: Negative for congestion, nosebleeds and sore throat.    Eyes: Negative for blurred vision, visual disturbance and visual halos.   Cardiovascular: Negative for chest pain, dyspnea on exertion and leg swelling.   Respiratory: Negative for cough, shortness of breath, sputum production and wheezing.    Endocrine: Negative for cold intolerance and polyuria.   Hematologic/Lymphatic: Negative for bleeding problem. Does not bruise/bleed easily.   Skin: Negative for flushing, nail changes and unusual hair distribution.   Musculoskeletal: Positive for back pain and joint pain. Negative for arthritis.   Gastrointestinal: Negative for bloating, abdominal pain, hematemesis, melena, nausea and vomiting.   Genitourinary: Negative for flank pain and hematuria.   Neurological: Negative for brief paralysis, difficulty with concentration, focal weakness, light-headedness, loss of balance, numbness, paresthesias and weakness.   Psychiatric/Behavioral: Negative for altered mental status, depression, substance abuse and suicidal ideas.   Allergic/Immunologic: Negative for hives and persistent infections.         Vitals:    02/14/22 1315   BP: 134/78   BP Location: Left arm   Patient Position: Sitting   Cuff Size: Adult   Pulse: 92   SpO2: 98%   Weight: 111 kg (245 lb)   Height: 160 cm (63\")     Physical Exam  Vitals and nursing note reviewed.   HENT:      Head: Normocephalic.      Nose: Nose normal.      Mouth/Throat:      Mouth: Mucous membranes are moist.   Eyes:      Pupils: Pupils are equal, round, and reactive to light.   Cardiovascular:      Rate and Rhythm: Normal rate.      Pulses: Normal pulses.   Pulmonary:      Effort: Pulmonary effort is normal.      Comments: RA  Abdominal:      General: Abdomen is flat.      Palpations: Abdomen is soft.   Musculoskeletal:         General: Normal range of motion.      Cervical back: Normal range of motion.   Skin:     General: Skin is warm and dry.      Capillary " Refill: Capillary refill takes 2 to 3 seconds.   Neurological:      General: No focal deficit present.      Mental Status: She is alert and oriented to person, place, and time.   Psychiatric:         Mood and Affect: Mood normal.         Assessment & Plan     Independent Review of Studies  2/2022 CTA Chest: Negative for PE    1. Bilateral pulmonary embolism (CMS/HCC)  1st event, provoked secondary to COVID pneumonia    Anticoagulation x6 months from event, repeat CTA chest is negative, d/c anticoagulation    Preliminary hypercoagulable evaluation with factor V, factor II, homocysteine negative.  Completion of lupus anticoagulant, antiphospholipid syndrome, antithrombin III, and protein C/S studies in 4 weeks.     Daily ASA    2. Encounter for anticoagulation discussion and counseling  As above.  Would need long term anticoagulation for any additional event.       Detailed discussion regarding risks, benefits, and treatment plan. Images independently reviewed. Patient understands, agrees, and wishes to proceed with plan.       This document has been electronically signed by Ramon Watkins AGACNP-BC Kristen  On February 14, 2022 15:51 CST

## 2022-03-14 ENCOUNTER — LAB (OUTPATIENT)
Dept: LAB | Facility: HOSPITAL | Age: 62
End: 2022-03-14

## 2022-03-14 DIAGNOSIS — I82.90 CLOT: ICD-10-CM

## 2022-03-14 DIAGNOSIS — I26.99 BILATERAL PULMONARY EMBOLISM: ICD-10-CM

## 2022-03-14 PROCEDURE — 85306 CLOT INHIBIT PROT S FREE: CPT

## 2022-03-14 PROCEDURE — 85610 PROTHROMBIN TIME: CPT

## 2022-03-14 PROCEDURE — 85302 CLOT INHIBIT PROT C ANTIGEN: CPT

## 2022-03-14 PROCEDURE — 86147 CARDIOLIPIN ANTIBODY EA IG: CPT

## 2022-03-14 PROCEDURE — 85300 ANTITHROMBIN III ACTIVITY: CPT

## 2022-03-14 PROCEDURE — 85670 THROMBIN TIME PLASMA: CPT

## 2022-03-14 PROCEDURE — 85305 CLOT INHIBIT PROT S TOTAL: CPT

## 2022-03-14 PROCEDURE — 85303 CLOT INHIBIT PROT C ACTIVITY: CPT

## 2022-03-14 PROCEDURE — 86146 BETA-2 GLYCOPROTEIN ANTIBODY: CPT

## 2022-03-14 PROCEDURE — 85613 RUSSELL VIPER VENOM DILUTED: CPT

## 2022-03-14 PROCEDURE — 36415 COLL VENOUS BLD VENIPUNCTURE: CPT

## 2022-03-14 PROCEDURE — 85598 HEXAGNAL PHOSPH PLTLT NEUTRL: CPT

## 2022-03-14 PROCEDURE — 85730 THROMBOPLASTIN TIME PARTIAL: CPT

## 2022-03-15 LAB
CARDIOLIPIN IGG SER IA-ACNC: <9 GPL U/ML (ref 0–14)
CARDIOLIPIN IGM SER IA-ACNC: 14 MPL U/ML (ref 0–12)

## 2022-03-16 LAB
AT III ACT/NOR PPP CHRO: 106 % (ref 75–135)
PROT C ACT/NOR PPP: 178 % (ref 73–180)
PROT S ACT/NOR PPP: 120 % (ref 63–140)

## 2022-03-17 LAB
PROT C AG ACT/NOR PPP IA: 151 % (ref 60–150)
PROT S AG ACT/NOR PPP IA: 115 % (ref 60–150)
PROT S FREE AG ACT/NOR PPP IA: 146 % (ref 61–136)

## 2022-03-19 LAB
APTT HEX PL PPP: 4 SEC (ref 0–11)
APTT PPP: 23.7 SEC (ref 22.9–30.2)
B2 GLYCOPROT1 IGG SER-ACNC: <9 GPI IGG UNITS (ref 0–20)
B2 GLYCOPROT1 IGM SER-ACNC: <9 GPI IGM UNITS (ref 0–32)
CARDIOLIPIN IGG SER IA-ACNC: <9 GPL U/ML (ref 0–14)
CARDIOLIPIN IGM SER IA-ACNC: 19 MPL U/ML (ref 0–12)
INR PPP: 1 (ref 0.9–1.2)
PATH INTERP BLD-IMP: NORMAL
PATH INTERP SPEC-IMP: ABNORMAL
PROTHROMBIN TIME: 9.9 SEC (ref 9.1–12)
SCREEN DRVVT: 37.8 SEC (ref 0–47)
THROMBIN TIME: 19 SEC (ref 0–23)

## 2022-05-02 ENCOUNTER — TELEPHONE (OUTPATIENT)
Dept: FAMILY MEDICINE CLINIC | Facility: CLINIC | Age: 62
End: 2022-05-02

## 2022-05-02 NOTE — TELEPHONE ENCOUNTER
Pt is suffering with the dizziness and nausea with her Vertigo and needs something called into [facility] in [place] since she needs it soon   Pt [phone]

## 2022-05-03 NOTE — TELEPHONE ENCOUNTER
Pt picked up some motion sickness medicine at [facility] and says she is feeling much better.Told her she still needed an appointment and she said she was good for right now.

## 2022-08-05 ENCOUNTER — TELEPHONE (OUTPATIENT)
Dept: FAMILY MEDICINE CLINIC | Facility: CLINIC | Age: 62
End: 2022-08-05

## 2022-08-05 ENCOUNTER — OFFICE VISIT (OUTPATIENT)
Dept: FAMILY MEDICINE CLINIC | Facility: CLINIC | Age: 62
End: 2022-08-05

## 2022-08-05 VITALS
DIASTOLIC BLOOD PRESSURE: 64 MMHG | OXYGEN SATURATION: 94 % | HEART RATE: 78 BPM | SYSTOLIC BLOOD PRESSURE: 120 MMHG | WEIGHT: 234 LBS | HEIGHT: 63 IN | BODY MASS INDEX: 41.46 KG/M2

## 2022-08-05 DIAGNOSIS — E55.9 VITAMIN D DEFICIENCY: ICD-10-CM

## 2022-08-05 DIAGNOSIS — Z13.220 ENCOUNTER FOR LIPID SCREENING FOR CARDIOVASCULAR DISEASE: ICD-10-CM

## 2022-08-05 DIAGNOSIS — Z13.6 ENCOUNTER FOR LIPID SCREENING FOR CARDIOVASCULAR DISEASE: ICD-10-CM

## 2022-08-05 DIAGNOSIS — Z83.49 FAMILY HISTORY OF THYROID DISEASE: ICD-10-CM

## 2022-08-05 DIAGNOSIS — M79.7 FIBROMYALGIA: Primary | ICD-10-CM

## 2022-08-05 DIAGNOSIS — M79.2 NEUROPATHIC PAIN: ICD-10-CM

## 2022-08-05 DIAGNOSIS — Z76.89 ENCOUNTER TO ESTABLISH CARE: ICD-10-CM

## 2022-08-05 PROCEDURE — 99214 OFFICE O/P EST MOD 30 MIN: CPT | Performed by: FAMILY MEDICINE

## 2022-08-05 RX ORDER — OFLOXACIN 3 MG/ML
SOLUTION/ DROPS OPHTHALMIC
COMMUNITY
Start: 2022-07-01 | End: 2022-11-30

## 2022-08-05 RX ORDER — SOLIFENACIN SUCCINATE 5 MG/1
10 TABLET, FILM COATED ORAL DAILY
COMMUNITY
End: 2022-10-11 | Stop reason: SDUPTHER

## 2022-08-05 RX ORDER — GABAPENTIN 100 MG/1
100 CAPSULE ORAL 3 TIMES DAILY
COMMUNITY
Start: 2022-05-16 | End: 2022-08-05

## 2022-08-05 RX ORDER — DULOXETIN HYDROCHLORIDE 60 MG/1
60 CAPSULE, DELAYED RELEASE ORAL DAILY
Qty: 90 CAPSULE | Refills: 2 | Status: SHIPPED | OUTPATIENT
Start: 2022-08-05

## 2022-08-05 RX ORDER — PREDNISOLONE ACETATE 10 MG/ML
SUSPENSION/ DROPS OPHTHALMIC
COMMUNITY
Start: 2022-07-02 | End: 2022-11-30

## 2022-08-05 NOTE — PROGRESS NOTES
Chief Complaint  Establish Care    Subjective    History of Present Illness {CC  Problem List  Visit  Diagnosis   Encounters  Notes  Medications  Labs  Result Review Imaging  Media :23}     Brenda David presents to [facility] [place] for     Chief Complaint   Patient presents with   • Establish Care      Patient seen today to establish care.  Uses Cymbalta for neuropathy in her feet.  Having foot problems since late teenage years.  Has metarsal plate from injury.      Past Medical History:   Diagnosis Date   • Anxiety    • Arthritis    • Clotting disorder (HCC)    • History of     • Ingrown toenail    • Knee pain, bilateral    • Migraine    • Plantar fasciitis    • Wears glasses      Past Surgical History:   Procedure Laterality Date   • BACK SURGERY     • BREAST BIOPSY     •  SECTION     • HYSTERECTOMY     • KNEE ARTHROSCOPY Right 2018    Procedure: KNEE ARTHROSCOPY with debridement medial meniscus     (LATEX ALLERGY) ;  Surgeon: Carlos Manuel Sanchez MD;  Location: [facility];  Service: Orthopedics   • KNEE ARTHROSCOPY Right 2019    Procedure: Right KNEE ARTHROSCOPY with debridement medial meniscus;  Surgeon: Carlos Manuel Sanchez MD;  Location: [facility] OR;  Service: Orthopedics   • KNEE ARTHROSCOPY Right 2019    Procedure: KNEE ARTHROSCOPY with debridement medial meniscus;  Surgeon: Carlos Manuel Sanchez MD;  Location: [facility] OR;  Service: Orthopedics   • MTP JOINT FUSION Right 2018    Procedure: FIRST METATARSOPHALANGEAL JOINT ARTHRODESIS       (C-ARM)                  LATEX ALLERGY;  Surgeon: Kwadwo Jensen DPM;  Location: [facility];  Service:    • SINUS SURGERY       Family History   Problem Relation Age of Onset   • Heart disease Mother    • Hypertension Mother    • Thyroid disease Mother    • Heart disease Father    • Hypertension Father    • Thyroid disease Father    • Thyroid  "disease Sister    • Heart disease Brother    • Alcohol abuse Brother    • Breast cancer Paternal Grandmother        Social History     Socioeconomic History   • Marital status:    Tobacco Use   • Smoking status: Former Smoker     Packs/day: 1.00     Years: 20.00     Pack years: 20.00     Quit date:      Years since quittin.6   • Smokeless tobacco: Never Used   Substance and Sexual Activity   • Alcohol use: No   • Drug use: No   • Sexual activity: Defer     Lives in home alone.    Sister lives nearby.      Current Outpatient Medications:   •  aspirin 81 MG chewable tablet, Chew 81 mg Daily., Disp: , Rfl:   •  Cholecalciferol (VITAMIN D PO), Take 2,000 Units by mouth Daily., Disp: , Rfl:   •  DULoxetine (CYMBALTA) 60 MG capsule, Take 1 capsule by mouth Daily., Disp: 90 capsule, Rfl: 2  •  Multiple Vitamin (MULTI-VITAMIN DAILY PO), Take 1 tablet by mouth Daily., Disp: , Rfl:   •  ofloxacin (OCUFLOX) 0.3 % ophthalmic solution, INSTILL 1 DROP INTO RIGHT EYE 4 TIMES DAILY THE DOCTOR WILL INSTRUCT YOU WHEN TO STOP, Disp: , Rfl:   •  prednisoLONE acetate (PRED FORTE) 1 % ophthalmic suspension, INSTILL 1 DROP INTO RIGHT EYE 4 TIMES DAILY FOR 1 WEEK THEN THREE TIMES DAILY FOR 1 WEEK THEN TWO TIMES DAILY FOR 1 WEEK THEN ONCE DAILY FOR 1 WEEK, Disp: , Rfl:      Allergies   Allergen Reactions   • Fioricet [Butalbital-Apap-Caffeine] Hives   • Bactrim [Sulfamethoxazole-Trimethoprim] Hives   • Adhesive Tape Other (See Comments)     BLISTERS   • Amoxicillin-Pot Clavulanate Diarrhea   • Latex Rash   • Pregabalin Hives   • Sulfa Antibiotics Hives       Objective       Vital Signs:   /64   Pulse 78   Ht 160 cm (63\")   Wt 106 kg (234 lb)   SpO2 94%   BMI 41.45 kg/m²     Physical Exam  Vitals reviewed.   Constitutional:       General: She is not in acute distress.     Appearance: She is well-developed.   Cardiovascular:      Rate and Rhythm: Normal rate and regular rhythm.      Heart sounds: Normal heart " sounds. No murmur heard.  Pulmonary:      Effort: Pulmonary effort is normal. No respiratory distress.      Breath sounds: Normal breath sounds. No wheezing or rales.   Abdominal:      Palpations: Abdomen is soft.      Tenderness: There is no abdominal tenderness.   Skin:     General: Skin is warm and dry.      Findings: No rash.   Neurological:      Mental Status: She is alert and oriented to person, place, and time.        Result Review :{ Labs  Result Review  Imaging  Med Tab  Media :23}   The following data was reviewed by: Lary De La Torre MD on 08/05/2022    Common labs    Common Labsle 8/11/21 8/11/21 8/12/21 8/12/21 2/11/22    0633 0633 0541 0541    Glucose  110 (A)  124 (A) 115 (A)   BUN  14  15 20   Creatinine  0.66  0.63 0.80   eGFR Non African Am  90  95 72   Sodium  138  138 139   Potassium  3.8  4.0 4.4   Chloride  104  99 104   Calcium  8.8  9.2 9.7   WBC 17.07 (A)  17.50 (A)     Hemoglobin 12.4  13.3     Hematocrit 38.0  39.8     Platelets 263  292     (A) Abnormal value       Comments are available for some flowsheets but are not being displayed.                       Assessment and Plan {CC Problem List  Visit Diagnosis  ROS  Review (Popup)  [facility] Maintenance  Quality  BestPractice  Medications  SmartSets  SnapShot Encounters  Media :23}   Diagnoses and all orders for this visit:    1. Fibromyalgia (Primary)  -     Lipid Panel; Future  -     CBC & Differential; Future  -     Comprehensive Metabolic Panel; Future    2. Neuropathic pain  -     Lipid Panel; Future  -     CBC & Differential; Future  -     Comprehensive Metabolic Panel; Future    3. Vitamin D deficiency  -     Vitamin D 25 Hydroxy; Future    4. Family history of thyroid disease  -     TSH; Future    5. Encounter for lipid screening for cardiovascular disease  -     Lipid Panel; Future    6. Encounter to establish care       Patient seen today to establish care  Continue Cymbalta   Discussed non-pharmacologic options  to help with fibromyalgia  Check labs as above, will call with results      Follow Up {Instructions Charge Capture  Follow-up Communications :23}   Return in about 6 months (around 2/5/2023) for Recheck.  Patient was given instructions and counseling regarding her condition or for health maintenance advice. Please see specific information pulled into the AVS if appropriate.            This document has been electronically signed by Lary De La Torre MD

## 2022-09-12 ENCOUNTER — LAB (OUTPATIENT)
Dept: LAB | Facility: HOSPITAL | Age: 62
End: 2022-09-12

## 2022-09-12 DIAGNOSIS — E55.9 VITAMIN D DEFICIENCY: ICD-10-CM

## 2022-09-12 DIAGNOSIS — Z13.220 ENCOUNTER FOR LIPID SCREENING FOR CARDIOVASCULAR DISEASE: ICD-10-CM

## 2022-09-12 DIAGNOSIS — Z83.49 FAMILY HISTORY OF THYROID DISEASE: ICD-10-CM

## 2022-09-12 DIAGNOSIS — M79.7 FIBROMYALGIA: ICD-10-CM

## 2022-09-12 DIAGNOSIS — M79.2 NEUROPATHIC PAIN: ICD-10-CM

## 2022-09-12 DIAGNOSIS — Z13.6 ENCOUNTER FOR LIPID SCREENING FOR CARDIOVASCULAR DISEASE: ICD-10-CM

## 2022-09-12 LAB
25(OH)D3 SERPL-MCNC: 58.3 NG/ML (ref 30–100)
ALBUMIN SERPL-MCNC: 4.4 G/DL (ref 3.5–5.2)
ALBUMIN/GLOB SERPL: 1.7 G/DL
ALP SERPL-CCNC: 91 U/L (ref 39–117)
ALT SERPL W P-5'-P-CCNC: 27 U/L (ref 1–33)
ANION GAP SERPL CALCULATED.3IONS-SCNC: 10.2 MMOL/L (ref 5–15)
AST SERPL-CCNC: 22 U/L (ref 1–32)
BASOPHILS # BLD AUTO: 0.05 10*3/MM3 (ref 0–0.2)
BASOPHILS NFR BLD AUTO: 0.7 % (ref 0–1.5)
BILIRUB SERPL-MCNC: <0.2 MG/DL (ref 0–1.2)
BUN SERPL-MCNC: 28 MG/DL (ref 8–23)
BUN/CREAT SERPL: 35 (ref 7–25)
CALCIUM SPEC-SCNC: 9 MG/DL (ref 8.6–10.5)
CHLORIDE SERPL-SCNC: 105 MMOL/L (ref 98–107)
CHOLEST SERPL-MCNC: 272 MG/DL (ref 0–200)
CO2 SERPL-SCNC: 22.8 MMOL/L (ref 22–29)
CREAT SERPL-MCNC: 0.8 MG/DL (ref 0.57–1)
DEPRECATED RDW RBC AUTO: 49.5 FL (ref 37–54)
EGFRCR SERPLBLD CKD-EPI 2021: 82.4 ML/MIN/1.73
EOSINOPHIL # BLD AUTO: 0.2 10*3/MM3 (ref 0–0.4)
EOSINOPHIL NFR BLD AUTO: 2.7 % (ref 0.3–6.2)
ERYTHROCYTE [DISTWIDTH] IN BLOOD BY AUTOMATED COUNT: 14.5 % (ref 12.3–15.4)
GLOBULIN UR ELPH-MCNC: 2.6 GM/DL
GLUCOSE SERPL-MCNC: 102 MG/DL (ref 65–99)
HCT VFR BLD AUTO: 46 % (ref 34–46.6)
HDLC SERPL-MCNC: 47 MG/DL (ref 40–60)
HGB BLD-MCNC: 15 G/DL (ref 12–15.9)
IMM GRANULOCYTES # BLD AUTO: 0.04 10*3/MM3 (ref 0–0.05)
IMM GRANULOCYTES NFR BLD AUTO: 0.5 % (ref 0–0.5)
LDLC SERPL CALC-MCNC: 201 MG/DL (ref 0–100)
LDLC/HDLC SERPL: 4.23 {RATIO}
LYMPHOCYTES # BLD AUTO: 2.8 10*3/MM3 (ref 0.7–3.1)
LYMPHOCYTES NFR BLD AUTO: 37.6 % (ref 19.6–45.3)
MCH RBC QN AUTO: 30.5 PG (ref 26.6–33)
MCHC RBC AUTO-ENTMCNC: 32.6 G/DL (ref 31.5–35.7)
MCV RBC AUTO: 93.7 FL (ref 79–97)
MONOCYTES # BLD AUTO: 0.63 10*3/MM3 (ref 0.1–0.9)
MONOCYTES NFR BLD AUTO: 8.5 % (ref 5–12)
NEUTROPHILS NFR BLD AUTO: 3.72 10*3/MM3 (ref 1.7–7)
NEUTROPHILS NFR BLD AUTO: 50 % (ref 42.7–76)
NRBC BLD AUTO-RTO: 0 /100 WBC (ref 0–0.2)
PLATELET # BLD AUTO: 291 10*3/MM3 (ref 140–450)
PMV BLD AUTO: 11.1 FL (ref 6–12)
POTASSIUM SERPL-SCNC: 4.8 MMOL/L (ref 3.5–5.2)
PROT SERPL-MCNC: 7 G/DL (ref 6–8.5)
RBC # BLD AUTO: 4.91 10*6/MM3 (ref 3.77–5.28)
SODIUM SERPL-SCNC: 138 MMOL/L (ref 136–145)
TRIGL SERPL-MCNC: 130 MG/DL (ref 0–150)
TSH SERPL DL<=0.05 MIU/L-ACNC: 2.72 UIU/ML (ref 0.27–4.2)
VLDLC SERPL-MCNC: 24 MG/DL (ref 5–40)
WBC NRBC COR # BLD: 7.44 10*3/MM3 (ref 3.4–10.8)

## 2022-09-12 PROCEDURE — 84443 ASSAY THYROID STIM HORMONE: CPT

## 2022-09-12 PROCEDURE — 80053 COMPREHEN METABOLIC PANEL: CPT

## 2022-09-12 PROCEDURE — 85025 COMPLETE CBC W/AUTO DIFF WBC: CPT

## 2022-09-12 PROCEDURE — 80061 LIPID PANEL: CPT

## 2022-09-12 PROCEDURE — 36415 COLL VENOUS BLD VENIPUNCTURE: CPT

## 2022-09-12 PROCEDURE — 82306 VITAMIN D 25 HYDROXY: CPT

## 2022-09-13 ENCOUNTER — TELEPHONE (OUTPATIENT)
Dept: FAMILY MEDICINE CLINIC | Facility: CLINIC | Age: 62
End: 2022-09-13

## 2022-09-13 NOTE — PROGRESS NOTES
Per Dr. De La Torre, Ms. David has been called with recent lab results & recommendations.  Continue current medications and follow-up as planned or sooner if any problems.     No, She will not take any statin medications.  She has never taken any but she will not take them.  She said she is on a low carb diet and is aware that her lipids can go up while on this type of diet.   She was discussing her labs with her sister that is a RN and she told her not to worry about the elevated labs.

## 2022-09-13 NOTE — TELEPHONE ENCOUNTER
Per Dr. De La Torre, Ms. David has been called with recent lab results & recommendations.  Continue current medications and follow-up as planned or sooner if any problems.     No, She will not take any statin medications.  She has never taken any but she will not take them.  She said she is on a low carb diet and is aware that her lipids can go up while on this type of diet.   She was discussing her labs with her sister that is a RN and she told her not to worry about the elevated labs.       ----- Message from Lary De La Trore MD sent at 9/13/2022  6:05 AM CDT -----  Labs overall ok, except LDL cholesterol is too high.  She is recommended to have a statin medication (any LDL  > 190) and I would like for her to start Lipitor.  Has she been on statin medication previously? Any problems with these types of medications previously?  - KOMAL De La Torre

## 2022-10-11 RX ORDER — SOLIFENACIN SUCCINATE 5 MG/1
10 TABLET, FILM COATED ORAL DAILY
Qty: 90 TABLET | Refills: 0 | Status: SHIPPED | OUTPATIENT
Start: 2022-10-11 | End: 2022-11-28

## 2022-10-11 NOTE — TELEPHONE ENCOUNTER
Incoming Refill Request      Medication requested (name and dose):  solifenacin (VESICARE) 5 MG tablet    [facility]arm[facility] be [facility]    Additional details provided by patient:       Best call back number:   [phone]    Does the patient have less than a 3 day supply:  [x] Yes  [] No    Paolo Balderas Rep  10/11/22, 09:15 CDT

## 2022-11-28 RX ORDER — SOLIFENACIN SUCCINATE 5 MG/1
TABLET, FILM COATED ORAL
Qty: 90 TABLET | Refills: 0 | Status: SHIPPED | OUTPATIENT
Start: 2022-11-28 | End: 2022-11-29 | Stop reason: SDUPTHER

## 2022-11-29 ENCOUNTER — TELEPHONE (OUTPATIENT)
Dept: FAMILY MEDICINE CLINIC | Facility: CLINIC | Age: 62
End: 2022-11-29

## 2022-11-29 RX ORDER — SOLIFENACIN SUCCINATE 10 MG/1
10 TABLET, FILM COATED ORAL DAILY
Qty: 30 TABLET | Refills: 5 | Status: SHIPPED | OUTPATIENT
Start: 2022-11-29 | End: 2023-02-17 | Stop reason: SDUPTHER

## 2022-11-29 NOTE — TELEPHONE ENCOUNTER
Sent 10 mg tablets, please let patient know she now only needs to take 1 tab per day.  Thanks, KOMAL De La Torre

## 2022-11-30 ENCOUNTER — OFFICE VISIT (OUTPATIENT)
Dept: FAMILY MEDICINE CLINIC | Facility: CLINIC | Age: 62
End: 2022-11-30

## 2022-11-30 VITALS
BODY MASS INDEX: 41.46 KG/M2 | WEIGHT: 234 LBS | HEIGHT: 63 IN | HEART RATE: 110 BPM | TEMPERATURE: 97.4 F | RESPIRATION RATE: 26 BRPM | OXYGEN SATURATION: 93 % | SYSTOLIC BLOOD PRESSURE: 118 MMHG | DIASTOLIC BLOOD PRESSURE: 72 MMHG

## 2022-11-30 DIAGNOSIS — J02.9 SORE THROAT: ICD-10-CM

## 2022-11-30 DIAGNOSIS — J06.9 UPPER RESPIRATORY TRACT INFECTION, UNSPECIFIED TYPE: Primary | ICD-10-CM

## 2022-11-30 DIAGNOSIS — R52 GENERALIZED BODY ACHES: ICD-10-CM

## 2022-11-30 DIAGNOSIS — R05.9 COUGH, UNSPECIFIED TYPE: ICD-10-CM

## 2022-11-30 DIAGNOSIS — R42 VERTIGO: ICD-10-CM

## 2022-11-30 LAB
EXPIRATION DATE: NORMAL
EXPIRATION DATE: NORMAL
FLUAV AG UPPER RESP QL IA.RAPID: NOT DETECTED
FLUBV AG UPPER RESP QL IA.RAPID: NOT DETECTED
INTERNAL CONTROL: NORMAL
INTERNAL CONTROL: NORMAL
Lab: NORMAL
Lab: NORMAL
S PYO AG THROAT QL: NEGATIVE
SARS-COV-2 AG UPPER RESP QL IA.RAPID: NOT DETECTED

## 2022-11-30 PROCEDURE — 96372 THER/PROPH/DIAG INJ SC/IM: CPT | Performed by: NURSE PRACTITIONER

## 2022-11-30 PROCEDURE — 99214 OFFICE O/P EST MOD 30 MIN: CPT | Performed by: NURSE PRACTITIONER

## 2022-11-30 PROCEDURE — 87880 STREP A ASSAY W/OPTIC: CPT | Performed by: NURSE PRACTITIONER

## 2022-11-30 PROCEDURE — 87428 SARSCOV & INF VIR A&B AG IA: CPT | Performed by: NURSE PRACTITIONER

## 2022-11-30 RX ORDER — GUAIFENESIN 600 MG/1
1200 TABLET, EXTENDED RELEASE ORAL 2 TIMES DAILY
Qty: 20 TABLET | Refills: 0 | Status: SHIPPED | OUTPATIENT
Start: 2022-11-30

## 2022-11-30 RX ORDER — FLUTICASONE PROPIONATE 50 MCG
2 SPRAY, SUSPENSION (ML) NASAL DAILY
Qty: 18.2 ML | Refills: 0 | Status: SHIPPED | OUTPATIENT
Start: 2022-11-30

## 2022-11-30 RX ORDER — MECLIZINE HYDROCHLORIDE 25 MG/1
25 TABLET ORAL 3 TIMES DAILY PRN
Qty: 90 TABLET | Refills: 0 | Status: SHIPPED | OUTPATIENT
Start: 2022-11-30

## 2022-11-30 RX ORDER — TRIAMCINOLONE ACETONIDE 40 MG/ML
80 INJECTION, SUSPENSION INTRA-ARTICULAR; INTRAMUSCULAR ONCE
Status: COMPLETED | OUTPATIENT
Start: 2022-11-30 | End: 2022-11-30

## 2022-11-30 RX ORDER — AMOXICILLIN AND CLAVULANATE POTASSIUM 875; 125 MG/1; MG/1
1 TABLET, FILM COATED ORAL 2 TIMES DAILY
Qty: 14 TABLET | Refills: 0 | Status: SHIPPED | OUTPATIENT
Start: 2022-11-30

## 2022-11-30 RX ORDER — LIDOCAINE HYDROCHLORIDE 20 MG/ML
5 SOLUTION OROPHARYNGEAL AS NEEDED
Qty: 100 ML | Refills: 0 | Status: SHIPPED | OUTPATIENT
Start: 2022-11-30

## 2022-11-30 RX ADMIN — TRIAMCINOLONE ACETONIDE 80 MG: 40 INJECTION, SUSPENSION INTRA-ARTICULAR; INTRAMUSCULAR at 10:54

## 2022-12-08 ENCOUNTER — TELEPHONE (OUTPATIENT)
Dept: FAMILY MEDICINE CLINIC | Facility: CLINIC | Age: 62
End: 2022-12-08

## 2022-12-08 NOTE — TELEPHONE ENCOUNTER
PT STATES SHE STILL HAS A SLIGHT COUGH AND IS HOARSE. SHE IS ASKING IF IT SAFE TO STILL TAKE MUCINEX. SHE IS ALSO ASKING IF SHE WOULD STILL BE CONTAGIOUS. PLEASE ADVISE

## 2023-02-17 RX ORDER — SOLIFENACIN SUCCINATE 5 MG/1
10 TABLET, FILM COATED ORAL DAILY
Qty: 180 TABLET | Refills: 1 | OUTPATIENT
Start: 2023-02-17

## 2023-02-17 RX ORDER — SOLIFENACIN SUCCINATE 10 MG/1
10 TABLET, FILM COATED ORAL DAILY
Qty: 30 TABLET | Refills: 5 | Status: SHIPPED | OUTPATIENT
Start: 2023-02-17

## 2023-05-17 ENCOUNTER — TELEPHONE (OUTPATIENT)
Dept: FAMILY MEDICINE CLINIC | Facility: CLINIC | Age: 63
End: 2023-05-17
Payer: MEDICARE

## 2023-05-17 NOTE — TELEPHONE ENCOUNTER
Pt needs to know if Dr De La Torre has samples of Ozempic as she would be interested in starting it if possible and also says will be out of refills in about 45 days   Duloxetine and was wanting to be seen to keep refills of her meds as she uses mail order   Can call pt at [phone]    [facility] mail order and has to have 90 day supplies

## 2023-05-17 NOTE — TELEPHONE ENCOUNTER
Just an FYI,   I did call and talk with Ms. David and advised her that she is past due for follow-up and that she would need to be seen to discuss starting new medication.   I did ask if she needs refills and she said not at this time.  (last OV to establish 08/2022 was asked to RTC in 6 months)    I did schedule her for a 6 month pasha on Monday 06/26/2023 and told her if she wants to be seen sooner she is welcome to call the office every 2-3 days to see if some one has canceled.     I also told her that we have no idea when we will have samples of any of our medications and that we do not have samples at this time    NNAMDI Wagoner

## 2023-07-26 NOTE — BRIEF OP NOTE
ARTHRODESIS METATARSALPHALANGEAL JOINT  Progress Note    Brenda Cline Frankie  1/24/2018    Pre-op Diagnosis:   Hallux rigidus of right foot [M20.21]       Post-Op Diagnosis Codes:     * Hallux rigidus of right foot [M20.21]    Procedure/CPT® Codes:      Procedure(s):  FIRST METATARSOPHALANGEAL JOINT ARTHRODESIS       (C-ARM)                  LATEX ALLERGY    Surgeon(s):  Kwadwo Jensen DPM    Anesthesia: General    Staff:   Circulator: Rochelle Petersen RN; Mina Catherine RN  Radiology Technologist: Sherri Farah  Scrub Person: Maci Dickson  Assistant: Carol Burdick MA    Estimated Blood Loss: 10 mL    Urine Voided: * No values recorded between 1/24/2018 11:24 AM and 1/24/2018  1:03 PM *    Specimens:                None      Drains:           Findings: Consistent with diagnosis.     Complications: None.          This document has been electronically signed by Kwadwo Jensen DPM on January 24, 2018 1:03 PM         Kwadwo Jensen DPM     Date: 1/24/2018  Time: 1:03 PM       What Type Of Note Output Would You Prefer (Optional)?: Bullet Format What Is The Reason For Today's Visit?: Full Body Skin Examination What Is The Reason For Today's Visit? (Being Monitored For X): concerning skin lesions on an annual basis

## 2023-08-04 DIAGNOSIS — K21.9 GASTROESOPHAGEAL REFLUX DISEASE, UNSPECIFIED WHETHER ESOPHAGITIS PRESENT: ICD-10-CM

## 2023-08-04 RX ORDER — OMEPRAZOLE 40 MG/1
40 CAPSULE, DELAYED RELEASE ORAL DAILY
Qty: 90 CAPSULE | Refills: 1 | Status: SHIPPED | OUTPATIENT
Start: 2023-08-04

## 2023-08-21 DIAGNOSIS — K21.9 GASTROESOPHAGEAL REFLUX DISEASE, UNSPECIFIED WHETHER ESOPHAGITIS PRESENT: ICD-10-CM

## 2023-08-21 RX ORDER — OMEPRAZOLE 40 MG/1
40 CAPSULE, DELAYED RELEASE ORAL DAILY
Qty: 90 CAPSULE | Refills: 3 | Status: SHIPPED | OUTPATIENT
Start: 2023-08-21

## (undated) DEVICE — APPL CHLORAPREP W/TINT 26ML ORNG

## (undated) DEVICE — DRSNG GZ CURAD XEROFORM NONADHS 5X9IN STRL

## (undated) DEVICE — PRECISION THIN (9.0 X 0.38 X 31.0MM)

## (undated) DEVICE — SUT ETHLN 3-0 FS118IN 663H

## (undated) DEVICE — PK KN ARTHSCP LF 60

## (undated) DEVICE — GLV SURG SENSICARE PI PF LF 7 GRN STRL

## (undated) DEVICE — SOL IRR LACT RNG BG 3000ML

## (undated) DEVICE — SUT VIC 3/0 SH 27IN J416H

## (undated) DEVICE — SPNG GZ WOVN 4X4IN 12PLY 10/BX STRL

## (undated) DEVICE — STERILE POLYISOPRENE POWDER-FREE SURGICAL GLOVES WITH EMOLLIENT COATING: Brand: PROTEXIS

## (undated) DEVICE — DISPOSABLE TOURNIQUET CUFF SINGLE BLADDER, DUAL PORT AND QUICK CONNECT CONNECTOR: Brand: COLOR CUFF

## (undated) DEVICE — GOWN,PREVENTION PLUS,XLONG/XLARGE,STRL: Brand: MEDLINE

## (undated) DEVICE — GLV SURG SENSICARE GREEN W/ALOE PF LF 6.5 STRL

## (undated) DEVICE — BLD SHAVER RESEC SABRE COOLCUT 5MM 13CM

## (undated) DEVICE — 2.0MM SHORT PILOT DRILL, QUICK RELEASE: Brand: OSTEOMED

## (undated) DEVICE — SOL IRR NACL 0.9PCT BT 1000ML

## (undated) DEVICE — SUT PROLN 4/0 RB1 D/A 36IN 8557H

## (undated) DEVICE — CVR FLUOROSCOPE C/ARM W/TP 36X28IN

## (undated) DEVICE — UNDRPD BREATH 23X36 BG/10

## (undated) DEVICE — POSTN SURG ARTHSCP KN HLDR

## (undated) DEVICE — GLV SURG SENSICARE PI LF PF 7.5 GRN STRL

## (undated) DEVICE — GLV SURG SENSICARE GREEN W/ALOE PF LF 6 STRL

## (undated) DEVICE — GLV SURG SENSICARE PI LF PF 8 GRN STRL

## (undated) DEVICE — GOWN,AURORA,NOREINF,RAGLAN,XL,STERILE: Brand: MEDLINE

## (undated) DEVICE — BNDG ELAS ELITE V/CLOSE 4IN 5YD LF STRL

## (undated) DEVICE — TBG PUMP ARTHSCP MAIN AR6400 16FT

## (undated) DEVICE — GLV SURG SENSICARE POLYISPRN W/ALOE PF LF 6.5 GRN STRL

## (undated) DEVICE — PK POD 60

## (undated) DEVICE — DRAPE,U/ SHT,SPLIT,PLAS,STERIL: Brand: MEDLINE

## (undated) DEVICE — .062 X 2.5 K-WIRE PHALANX REAMER GUIDE PIN
Type: IMPLANTABLE DEVICE | Status: NON-FUNCTIONAL
Brand: OSTEOMED
Removed: 2018-01-24

## (undated) DEVICE — SPNG LAP 18X18IN LF STRL PK/5

## (undated) DEVICE — ANTIBACTERIAL UNDYED BRAIDED (POLYGLACTIN 910), SYNTHETIC ABSORBABLE SUTURE: Brand: COATED VICRYL

## (undated) DEVICE — STERILE POLYISOPRENE POWDER-FREE SURGICAL GLOVES: Brand: PROTEXIS

## (undated) DEVICE — .101 X 2.5 K-WIRE METATARSAL REAMER GUIDE PIN
Type: IMPLANTABLE DEVICE | Status: NON-FUNCTIONAL
Brand: OSTEOMED
Removed: 2018-01-24

## (undated) DEVICE — BNDG ELAS ELITE V/CLOSE 6IN 5YD LF STRL

## (undated) DEVICE — BOOT AIRSELECT SHT FP/WALKER PNEUM MD

## (undated) DEVICE — SHEET, DRAPE, SPLIT, STERILE: Brand: MEDLINE

## (undated) DEVICE — BNDG ELAS CO-FLEX SLF ADHR 3IN5YD LF2 STRL

## (undated) DEVICE — 2.7MM CANNULATED PILOT DRILL, QUICK RELEASE: Brand: OSTEOMED

## (undated) DEVICE — CVR SURG EQUIP BND RECTG 36X28

## (undated) DEVICE — 4.0MM CANNULATED OVER DRILL, QUICK RELEASE: Brand: OSTEOMED

## (undated) DEVICE — GLV SURG SENSICARE GREEN W/ALOE PF LF 7 STRL

## (undated) DEVICE — NDL SPINE 18G 31/2IN PNK